# Patient Record
Sex: MALE | Race: WHITE | NOT HISPANIC OR LATINO | Employment: FULL TIME | URBAN - METROPOLITAN AREA
[De-identification: names, ages, dates, MRNs, and addresses within clinical notes are randomized per-mention and may not be internally consistent; named-entity substitution may affect disease eponyms.]

---

## 2017-11-22 ENCOUNTER — ALLSCRIPTS OFFICE VISIT (OUTPATIENT)
Dept: OTHER | Facility: OTHER | Age: 37
End: 2017-11-22

## 2017-11-22 DIAGNOSIS — E78.00 PURE HYPERCHOLESTEROLEMIA: ICD-10-CM

## 2017-11-22 DIAGNOSIS — Q23.1 CONGENITAL INSUFFICIENCY OF AORTIC VALVE: ICD-10-CM

## 2017-11-22 DIAGNOSIS — I10 ESSENTIAL (PRIMARY) HYPERTENSION: ICD-10-CM

## 2017-12-06 ENCOUNTER — GENERIC CONVERSION - ENCOUNTER (OUTPATIENT)
Dept: OTHER | Facility: OTHER | Age: 37
End: 2017-12-06

## 2017-12-06 ENCOUNTER — ALLSCRIPTS OFFICE VISIT (OUTPATIENT)
Dept: OTHER | Facility: OTHER | Age: 37
End: 2017-12-06

## 2017-12-27 ENCOUNTER — ALLSCRIPTS OFFICE VISIT (OUTPATIENT)
Dept: OTHER | Facility: OTHER | Age: 37
End: 2017-12-27

## 2018-01-10 NOTE — PROGRESS NOTES
Assessment   1  Benign essential hypertension (401 1) (I10)  2  Hypercholesterolemia (272 0) (E78 0)  3  Elevated serum creatinine (790 99) (R74 8)    Plan  Elevated serum creatinine    · (1) BASIC METABOLIC PROFILE; Status:Active; Requested XFL:[id number];     Discussion/Summary  Discussion Summary:   1  Hypertension- BP well controlled with diet and exercise  2  Hyperlipidemia- continue heart healthy diet and exercise program    3  Elevated creatinine- please repeat BMP in 1 week  Patient eats a high protein diet  Educated on length on well balanced diet  Patient does not eat fruits or vegetables  Please STOP chewing tobacco  Educated patient to follow up with dentist every 6 months  F/u 4 months  Counseling Documentation With Imm: The patient was counseled regarding diagnostic results, instructions for management, risk factor reductions, prognosis, patient and family education, impressions, risks and benefits of treatment options, importance of compliance with treatment  Medication SE Review and Pt Understands Tx: Possible side effects of new medications were reviewed with the patient/guardian today  The treatment plan was reviewed with the patient/guardian  The patient/guardian understands and agrees with the treatment plan      Chief Complaint  Chief Complaint Free Text Note Form: Pt  here to f/u HTN and cholesterol  review b/w results   no new c/o      History of Present Illness  Hyperlipidemia (Follow-Up): The patient states his hyperlipidemia has been under good control since the last visit  He has no comorbid illnesses  Symptoms: denies chest pain, denies intermittent leg claudication, denies muscle pain and denies muscle weakness  Associated symptoms include no focal neurologic deficits  Medications: The patient is not currently on any medications for his hyperlipidemia  The patient is doing well with his hyperlipidemia goals  the patient's LDL goal is <130 mg/dL     Hypertension (Follow-Up): The patient presents for follow-up of essential hypertension  The patient states he has been doing well with his blood pressure control since the last visit  Symptoms: denies impaired vision, denies dyspnea, denies chest pain, denies intermittent leg claudication and denies lower extremity edema  Associated symptoms include no headache  Home monitoring: The patient is not checking blood pressure at home  Medications: The patient is not currently on any medications for his hypertension--lifestyle modification only  Review of Systems  Complete-Male:   Constitutional: no fever, not feeling poorly, no chills and not feeling tired  Eyes: no eyesight problems  ENT: no sore throat  Cardiovascular: no chest pain and no palpitations  Respiratory: no shortness of breath  Gastrointestinal: no abdominal pain, no nausea and no diarrhea  Genitourinary: no dysuria  Musculoskeletal: no arthralgias and no myalgias  Integumentary: no rashes  Neurological: no headache, no numbness and no dizziness  Psychiatric: no anxiety and no depression  Hematologic/Lymphatic: no tendency for easy bleeding and no tendency for easy bruising  ROS Reviewed:   ROS reviewed  Active Problems   1  Benign essential hypertension (401 1) (I10)  2  Chewing tobacco nicotine dependence (305 1) (F17 220)  3  Hypercholesterolemia (272 0) (E78 0)  4  Plantar fasciitis of left foot (728 71) (M72 2)  5  Uncontrolled hypertension (401 9) (I10)    Past Medical History   1  History of Acute URI (465 9) (J06 9)  2  History of Encounter for screening examination for impaired glucose regulation and diabetes mellitus   (V77 1) (Z13 1)  3  History of headache (V13 89) (Z87 898)  4  History of Hypertension (401 9) (I10)  5  History of Screening for depression (V79 0) (Z13 89)  6  History of Snoring (786 09) (R06 83)  Active Problems And Past Medical History Reviewed:    The active problems and past medical history were reviewed and updated today  Surgical History   1  History of Primary Repair Of Knee Ligament Cruciate Anterior  Surgical History Reviewed: The surgical history was reviewed and updated today  Family History   1  Family history of Benign essential hypertension   2  Family history of Carotid stenosis  3  Family history of Hyperlipidemia  4  Family history of Hypertension  5  Family history of Stroke   6  Family history of Lung cancer   7  Family history of Coronary artery disease  8  Family history of Hyperlipidemia  9  Family history of Hypertension  10  Family history of Type 2 diabetes mellitus  Family History Reviewed: The family history was reviewed and updated today  Social History    · Alcohol use (V49 89) (F10 99)   · Chewing tobacco nicotine dependence (305 1) (F17 220)   · Denied: History of Drug use   · Never a smoker  Social History Reviewed: The social history was reviewed and updated today  Current Meds  1  BuPROPion HCl ER (XL) 150 MG Oral Tablet Extended Release 24 Hour; Take 1 tablet twice daily; Therapy: 97Kyd4904 to (Evaluate:24Jan2016)  Requested for: 91TRK3494; Last Rx:26Oct2015   Ordered  2  Fluticasone Propionate 50 MCG/ACT Nasal Suspension; USE 1 SPRAY IN EACH NOSTRIL TWICE   DAILY; Therapy: 68CNW6215 to (Evaluate:30Jan2015)  Requested for: 02GBI6217; Last XS:10DYZ7099   Ordered  Medication List Reviewed: The medication list was reviewed and updated today  Allergies   1  Penicillins   2  No Known Environmental Allergies  3  No Known Food Allergies    Vitals  Vital Signs [Data Includes: Current Encounter]    Recorded: [id number] 08:53AM   Temperature 98 F, Oral   Heart Rate 73   Systolic 435, LUE, Sitting   Diastolic 84, LUE, Sitting   Weight 293 lb    BMI Calculated 38 13   BSA Calculated 2 54   O2 Saturation 99     Physical Exam    Constitutional   General appearance: No acute distress, well appearing and well nourished      Eyes   Conjunctiva and lids: No swelling, erythema, or discharge  Ears, Nose, Mouth, and Throat   External inspection of ears and nose: Normal     Otoscopic examination: Tympanic membrance translucent with normal light reflex  Canals patent without erythema  Nasal mucosa, septum, and turbinates: Normal without edema or erythema  Oropharynx: Normal with no erythema, edema, exudate or lesions  Pulmonary   Respiratory effort: No increased work of breathing or signs of respiratory distress  Auscultation of lungs: Clear to auscultation, equal breath sounds bilaterally, no wheezes, no rales, no rhonci  no wheezing  Cardiovascular   Palpation of heart: Normal PMI, no thrills  Auscultation of heart: Normal rate and rhythm, normal S1 and S2, without murmurs  Abdomen   Abdomen: Non-tender, no masses  Liver and spleen: No hepatomegaly or splenomegaly  Lymphatic   Palpation of lymph nodes in neck: No lymphadenopathy  Musculoskeletal   Gait and station: Normal     Skin   Skin and subcutaneous tissue: Normal without rashes or lesions  Neurologic no focal deficits  Psychiatric   Orientation to person, place and time: Normal     Mood and affect: Normal          Results/Data  Encounter Results   (1) LIPID PANEL, FASTING 79BRG5313 07:31AM Misbah Bui     Test Name Result Flag Reference   LDL CHOLESTEROL CALCULATED 94       Summary / No summary entered :      No summary entered  Documents attached :      [address] Work - \[facility]\"" pa, team; Enc: [id number] - Image Encounter - \[facility]\"" pa, team -      (Interventional Medicine) (Additional Information Document)    Health Management  History of Screening for depression   *VB-Depression Screening; every 1 year; Last 18VMA3331; Next Due: 25XZO5554;  Active    Future Appointments    Date/Time Provider Specialty Site   05/23/2016 09:15 AM Hank Jim MD Internal Medicine [facility] PRIMARY [facility]     Signatures   Electronically signed by : Liza Fierro, [address] [place] [address]; Jan 13 2016  9:26AM EST (Author)    Electronically signed by : Elvira Osborn MD; Jan 14 2016  4:33PM EST                       (Validation)    Electronically signed by : Elvira Osborn MD; Jan 14 2016  4:33PM EST                       (Validation)

## 2018-01-15 VITALS
DIASTOLIC BLOOD PRESSURE: 82 MMHG | HEART RATE: 64 BPM | WEIGHT: 301.38 LBS | HEIGHT: 74 IN | BODY MASS INDEX: 38.68 KG/M2 | SYSTOLIC BLOOD PRESSURE: 162 MMHG | TEMPERATURE: 95.8 F | OXYGEN SATURATION: 98 %

## 2018-01-17 NOTE — PROGRESS NOTES
Assessment    1  Encounter for preventive health examination (V70 0) (Z00 00)    Plan  Elevated serum creatinine    · (1) BASIC METABOLIC PROFILE; Status:Canceled;   SocHx: Chewing tobacco nicotine dependence    · BuPROPion HCl ER (XL) 150 MG Oral Tablet Extended Release 24 Hour  (Wellbutrin XL)    Discussion/Summary  Impression: health maintenance visit  Currently, he eats a healthy diet  Prostate cancer screening: the risks and benefits of prostate cancer screening were discussed  Testicular cancer screening: the risks and benefits of testicular cancer screening were discussed and self testicular exam technique was taught  Colorectal cancer screening: the risks and benefits of colorectal cancer screening were discussed and colorectal cancer screening is current  Screening lab work includes glucose, lipid profile and urinalysis  Chief Complaint  PT IS HERE FOR A PHYSICAL- PLEASE DISCUSS PT'S FLU SHOT - STATES PERMANENT DEFERRAL      History of Present Illness  , Adult Male: The patient is being seen for a health maintenance evaluation  The last health maintenance visit was 1 year(s) ago  Social History: Household members include spouse and 2 son(s)  He is   Work status: working full time  The patient has never smoked cigarettes  He reports occasional alcohol use  He has never used illicit drugs  General Health: The patient's health since the last visit is described as good  He has regular dental visits  He denies vision problems  He denies hearing loss  Lifestyle:  He consumes a diverse and healthy diet  He does not have any weight concerns  He exercises regularly  He does not use tobacco  He denies alcohol use  He denies drug use  Reproductive health:  the patient is sexually active  He denies erectile dysfunction  Screening: Prostate cancer screening includes no previous evaluation and uncertain timing of prostate-specific antigen testing   Testicular cancer screening includes no previous evaluation and monthly self testicular examinations  Colorectal cancer screening includes no previous screening  Metabolic screening includes lipid profile performed within the past five years, glucose screening performed last year and thyroid function test performed last year  Cardiovascular risk factors: obesity, but no hypertension, no diabetes, no high LDL cholesterol, no low HDL cholesterol, no stress, no tobacco use, no illicit drug use, no sedentary lifestyle and no family history of cardiovascular disease  General health risks: family history of prostate cancer, but no elevated prostate-specific antigen, no undescended testis, no previous colon polyps, no inflammatory bowel disease, no osteoporosis risk factors, no asbestos exposure and no occupational exposure to  Safety elements used: sunscreen, smoke detector, carbon monoxide detector, gun trigger locks and gun safe, but no seat belt, no motorcycle helmet and no safe driving habits  Review of Systems    Constitutional: no fever, not feeling poorly, no chills and not feeling tired  Eyes: no eyesight problems  ENT: no sore throat  Cardiovascular: no chest pain and no palpitations  Respiratory: no shortness of breath  Gastrointestinal: no abdominal pain, no nausea and no diarrhea  Genitourinary: no dysuria  Musculoskeletal: no arthralgias and no myalgias  Integumentary: no rashes  Neurological: no headache, no numbness and no dizziness  Psychiatric: no anxiety and no depression  Hematologic/Lymphatic: no tendency for easy bleeding and no tendency for easy bruising  Active Problems    1  Acute pain of left shoulder (719 41) (M25 512)   2  Aftercare following other surgery of musculoskeletal system (V58 78) (Z47 89)   3  Benign essential hypertension (401 1) (I10)   4  Chewing tobacco nicotine dependence (305 1) (F17 220)   5  Elevated serum creatinine (790 99) (R79 89)   6  Hypercholesterolemia (272 0) (E78 00)   7  Internal derangement of left shoulder (719 81) (M24 112)   8  Need for Tdap vaccination (V06 1) (Z23)   9  Plantar fasciitis of left foot (728 71) (M72 2)   10  Rotator cuff tendonitis, left (726 10) (M75 82)   11  S/P arthroscopy of shoulder (V45 89) (Z98 890)   12  Shoulder weakness (719 61) (M62 81)   13  Superior glenoid labrum lesion of left shoulder, subsequent encounter (V58 89,840 7)    (S43 432D)   14  Uncontrolled hypertension (401 9) (I10)    Past Medical History    · History of Acute URI (465 9) (J06 9)   · History of Encounter for screening examination for impaired glucose regulation and  diabetes mellitus (V77 1) (Z13 1)   · History of headache (V13 89) (M06 140)   · History of Hypertension (401 9) (I10)   · History of Screening for depression (V79 0) (Z13 89)   · History of Snoring (786 09) (R06 83)    The active problems and past medical history were reviewed and updated today  Surgical History    · History of Primary Repair Of Knee Ligament Cruciate Anterior    The surgical history was reviewed and updated today  Family History  Father    · Family history of Benign essential hypertension  Paternal Grandmother    · Family history of Carotid stenosis   · Family history of Hyperlipidemia   · Family history of Hypertension   · Family history of Stroke  Maternal Grandfather    · Family history of Lung cancer  Paternal Grandfather    · Family history of Coronary artery disease   · Family history of Hyperlipidemia   · Family history of Hypertension   · Family history of Type 2 diabetes mellitus    The family history was reviewed and updated today  Social History    · Alcohol use (V49 89) (Z78 9)   · Chewing tobacco nicotine dependence (305 1) (F17 220)   · Denied: History of Drug use   · Never a smoker  The social history was reviewed and updated today  Current Meds   1  BuPROPion HCl ER (XL) 150 MG Oral Tablet Extended Release 24 Hour; Take 1 tablet   twice daily;    Therapy: 41NGB8866 to (Evaluate:77Zcj9519)  Requested for: 07Apr2016; Last   Rx:07Spt7417 Ordered    The medication list was reviewed and updated today  Allergies    1  Penicillins    2  No Known Environmental Allergies   3  No Known Food Allergies    Vitals   Recorded: 36WJR4457 75:02AW   Systolic 346, LUE   Diastolic 82, LUE   Heart Rate 51   Temperature 97 8 F, Tympanic   O2 Saturation 98   Height 6 ft 1 5 in   Weight 287 lb 6 oz   BMI Calculated 37 4   BSA Calculated 2 52     Physical Exam    Constitutional   General appearance: No acute distress, well appearing and well nourished  Eyes   Conjunctiva and lids: No swelling, erythema, or discharge  Ears, Nose, Mouth, and Throat   External inspection of ears and nose: Normal     Otoscopic examination: Tympanic membrance translucent with normal light reflex  Canals patent without erythema  Nasal mucosa, septum, and turbinates: Normal without edema or erythema  Oropharynx: Normal with no erythema, edema, exudate or lesions  Pulmonary   Respiratory effort: No increased work of breathing or signs of respiratory distress  Auscultation of lungs: Clear to auscultation, equal breath sounds bilaterally, no wheezes, no rales, no rhonci  no wheezing  Cardiovascular   Palpation of heart: Normal PMI, no thrills  Auscultation of heart: Normal rate and rhythm, normal S1 and S2, without murmurs  Abdomen   Abdomen: Non-tender, no masses  Liver and spleen: No hepatomegaly or splenomegaly  Lymphatic   Palpation of lymph nodes in neck: No lymphadenopathy  Musculoskeletal   Gait and station: Normal     Skin   Skin and subcutaneous tissue: Normal without rashes or lesions  Neurologic no focal deficits  Psychiatric   Orientation to person, place and time: Normal     Mood and affect: Normal     Additional Exam:  It is post shoulder surgery  Health Management  History of Screening for depression   *VB-Depression Screening; every 1 year;  Last 48JVV1309; Next Due: 47RJL6295; Near  Due    Future Appointments    Date/Time Provider Specialty Site   11/18/2016 08:40 AM SHAHEEN King   Orthopedic Surgery [address]      Signatures   Electronically signed by : Margoth Villegas MD; Oct 17 2016 11:08AM EST                       (Author)

## 2018-01-19 ENCOUNTER — ALLSCRIPTS OFFICE VISIT (OUTPATIENT)
Dept: OTHER | Facility: OTHER | Age: 38
End: 2018-01-19

## 2018-01-19 ENCOUNTER — APPOINTMENT (OUTPATIENT)
Dept: LAB | Facility: MEDICAL CENTER | Age: 38
End: 2018-01-19
Payer: COMMERCIAL

## 2018-01-19 ENCOUNTER — TRANSCRIBE ORDERS (OUTPATIENT)
Dept: RADIOLOGY | Facility: MEDICAL CENTER | Age: 38
End: 2018-01-19

## 2018-01-19 DIAGNOSIS — R14.0 ABDOMINAL DISTENSION (GASEOUS): ICD-10-CM

## 2018-01-19 PROCEDURE — 36415 COLL VENOUS BLD VENIPUNCTURE: CPT

## 2018-01-19 PROCEDURE — 83516 IMMUNOASSAY NONANTIBODY: CPT

## 2018-01-19 PROCEDURE — 82784 ASSAY IGA/IGD/IGG/IGM EACH: CPT

## 2018-01-19 PROCEDURE — 86255 FLUORESCENT ANTIBODY SCREEN: CPT

## 2018-01-20 LAB
ENDOMYSIUM IGA SER QL: NEGATIVE
GLIADIN PEPTIDE IGA SER-ACNC: 6 UNITS (ref 0–19)
GLIADIN PEPTIDE IGG SER-ACNC: 3 UNITS (ref 0–19)
IGA SERPL-MCNC: 182 MG/DL (ref 90–386)
TTG IGA SER-ACNC: <2 U/ML (ref 0–3)
TTG IGG SER-ACNC: <2 U/ML (ref 0–5)

## 2018-01-20 NOTE — PROGRESS NOTES
Assessment   1  Benign essential hypertension (401 1) (I10)  2  Abdominal bloating (787 3) (R14 0)    Plan   Abdominal bloating    · (1) CELIAC DISEASE AB PROFILE; Status:Active; Requested WNO:[id number];    · Follow-up visit in 3 weeks Evaluation and Treatment  Follow-up  Status: Hold For - Scheduling     Requested for: 36VEG0410  Benign essential hypertension    · Start: Valsartan 80 MG Oral Tablet; TAKE 1 TABLET DAILY    Discussion/Summary   Discussion Summary:    Dc coreg, rx losaartan and bp ck here 3 weeks  Exercise ,increase water, portion control  Check celiac panel as pt feels gi sxs have been for sometime and may be due to this  Trying to get stress test approved1              Medication SE Review and Pt Understands Tx: Possible side effects of new medications were reviewed with the patient/guardian today  The treatment plan was reviewed with the patient/guardian  The patient/guardian understands and agrees with the treatment plan             Self Referrals:    Self Referrals: No       1 Amended By: Sarahi Nina; Jan 19 2018 9:25 PM EST      Chief Complaint   Chief Complaint Free Text Note Form: Pt presents for f/up exam  Pt states since his increase in BP medication he has had severe fatigue, diarrhea and not feeling well  Unable to check BP at home  Appetite is normal per patient  Sleep disruptive, normal per patient  Chief Complaint Chronic Condition [facility]: Patient is here today for follow up of chronic conditions described in HPI  History of Present Illness   HPI: Does not feel tolerating coreg well  He us exercising more and trying to eat healthier  Has had looser stills and fatigue on rx  No cp or sob  No dizziness1        1 Amended By: Sarahi Nina; Jan 19 2018 9:22 PM EST      Review of Systems   Complete-Male:      Constitutional:1  No fever or chills, feels well, no tiredness, no recent weight gain or weight loss1         Eyes:1  No complaints of eye pain, no red eyes, no discharge from eyes, no itchy eyes1   ENT:1  no complaints of earache, no hearing loss, no nosebleeds, no nasal discharge, no sore throat, no hoarseness1   Cardiovascular: 1  No complaints of slow heart rate, no fast heart rate, no chest pain, no palpitations, no leg claudication, no lower extremity1   Respiratory:1  shortness of breath during exertion1   Gastrointestinal:1  nausea1 -- and-- diarrhea1   Genitourinary:1  No complaints of dysuria, no incontinence, no hesitancy, no nocturia, no genital lesion, no testicular pain1   Musculoskeletal:1  No complaints of arthralgia, no myalgias, no joint swelling or stiffness, no limb pain or swelling1   Integumentary:1  No complaints of skin rash or skin lesions, no itching, no skin wound, no dry skin1   Neurological:1  No compliants of headache, no confusion, no convulsions, no numbness or tingling, no dizziness or fainting, no limb weakness, no difficulty walking1   Psychiatric:1  Is not suicidal, no sleep disturbances, no anxiety or depression, no change in personality, no emotional problems1   Endocrine:1  No complaints of proptosis, no hot flashes, no muscle weakness, no erectile dysfunction, no deepening of the voice, no feelings of weakness1   Hematologic/Lymphatic:1  No complaints of swollen glands, no swollen glands in the neck, does not bleed easily, no easy bruising1         1 Amended By: Aliya Perez; Jan 19 2018 9:23 PM EST      Active Problems   1  Benign essential hypertension (401 1) (I10)  2  Bicuspid aortic valve (746 4) (Q23 1)  3  Elevated serum creatinine (790 99) (R79 89)  4  Essential hypertension (401 9) (I10)  5  Exercise counseling (V65 41) (Z71 82)  6  Hypercholesterolemia (272 0) (E78 00)  7  Internal derangement of left shoulder (719 81) (M24 812)  8  Need for influenza vaccination (V04 81) (Z23)  9  Plantar fasciitis of left foot (728 71) (M72 2)  10   Rotator cuff tendonitis, left (726 10) (M97 82)  11  S/P arthroscopy of shoulder (V45 89) (Z98 890)  12  Screening for depression (V79 0) (Z13 89)  13  Shoulder weakness (719 61) (R29 898)  14  Superior glenoid labrum lesion of left shoulder, subsequent encounter (V58 89,840 7) (S43 432D)  15  Uncontrolled hypertension (401 9) (I10)    Past Medical History   1  History of Acute pain of left shoulder (719 41) (M25 512)  2  History of Aftercare following other surgery of musculoskeletal system (V58 78) (Z47 89)  3  History of Hypertension (401 9) (I10)  Active Problems And Past Medical History Reviewed: The active problems and past medical history were reviewed and updated today  Surgical History   1  History of Arthroscopy Shoulder Left  2  History of Primary Repair Of Knee Ligament Cruciate Anterior  Surgical History Reviewed: The surgical history was reviewed and updated today  Family History   Father   1  Family history of Benign essential hypertension  Paternal Grandmother   2  Family history of Carotid stenosis  3  Family history of Hyperlipidemia  4  Family history of Hypertension  5  Family history of Stroke  Maternal Grandfather   6  Family history of Lung cancer  Paternal Grandfather   7  Family history of Coronary artery disease  8  Family history of Hyperlipidemia  9  Family history of Hypertension  10  Family history of Type 2 diabetes mellitus  Family History Reviewed: The family history was reviewed and updated today  Social History    · Alcohol use (V49 89) (Z78 9)   · Always uses seat belt   · Caffeine use (V49 89) (F15 90)   · Dental care, regularly   · Denied: History of Drug use   · Never a smoker   ·  (V61 07) (Z63 4)  Social History Reviewed: The social history was reviewed and updated today  The social history was reviewed and is unchanged  Current Meds   1  Carvedilol 6 25 MG Oral Tablet; TAKE 1 TABLET BY MOUTH BID;      Therapy: 02UHS3449 to (Evaluate:92Kly2317)  Requested for: 36LML2265; Last Rx:89Kyo2622     Ordered  Medication List Reviewed: The medication list was reviewed and updated today  Allergies   1  Penicillins  2  No Known Environmental Allergies  3  No Known Food Allergies    Vitals   Vital Signs    Recorded: 83TVP1292 09:40AM Recorded: 27GQE9096 09:20AM   Temperature  96 1 F, Tympanic   Heart Rate  55   Systolic 332    Diastolic 78    Height  6 ft 1 5 in   Weight  292 lb 6 oz   BMI Calculated  38 05   BSA Calculated  2 54   O2 Saturation  98, RA     Physical Exam        Constitutional      General appearance: No acute distress, well appearing and well nourished  -- Comfortable at rest1   Eyes      Conjunctiva and lids: No swelling, erythema, or discharge  Pupils and irises: Equal, round and reactive to light  Ears, Nose, Mouth, and Throat      External inspection of ears and nose: Normal        Otoscopic examination: Tympanic membrance translucent with normal light reflex  Canals patent without erythema  Nasal mucosa, septum, and turbinates: Normal without edema or erythema  Oropharynx: Normal with no erythema, edema, exudate or lesions  Pulmonary      Respiratory effort: No increased work of breathing or signs of respiratory distress  Auscultation of lungs: Clear to auscultation, equal breath sounds bilaterally, no wheezes, no rales, no rhonci  Cardiovascular      Palpation of heart: Normal PMI, no thrills  Auscultation of heart: Normal rate and rhythm, normal S1 and S2, without murmurs  Examination of extremities for edema and/or varicosities: Normal        Carotid pulses: Normal        Abdomen      Abdomen: Non-tender, no masses  Liver and spleen: No hepatomegaly or splenomegaly  Lymphatic      Palpation of lymph nodes in neck: No lymphadenopathy  Musculoskeletal      Gait and station: Normal        Digits and nails: Normal without clubbing or cyanosis         Inspection/palpation of joints, bones, and muscles: Normal        Skin      Skin and subcutaneous tissue: Normal without rashes or lesions  Neurologic      Cranial nerves: Cranial nerves 2-12 intact  Reflexes: 2+ and symmetric  Sensation: No sensory loss  Psychiatric      Orientation to person, place and time: Normal        Mood and affect: Normal            1 Amended By: Carmencita Shaw; Jan 19 2018 9:23 PM EST      Health Management   History of Screening for depression   *VB-Depression Screening; every 1 year; Last 22Nov2017; Next Due: 22Nov2018;  Active    Signatures    Electronically signed by : Tommy Jimenez DO; Jan 19 2018  9:46AM EST                       (Author)     Electronically signed by : Tommy Jimenez DO; Jan 19 2018  9:25PM EST                       (Author)

## 2018-01-22 VITALS — SYSTOLIC BLOOD PRESSURE: 140 MMHG | DIASTOLIC BLOOD PRESSURE: 88 MMHG

## 2018-01-23 VITALS
SYSTOLIC BLOOD PRESSURE: 136 MMHG | DIASTOLIC BLOOD PRESSURE: 78 MMHG | TEMPERATURE: 96.1 F | OXYGEN SATURATION: 98 % | HEIGHT: 74 IN | WEIGHT: 292.38 LBS | HEART RATE: 55 BPM | BODY MASS INDEX: 37.52 KG/M2

## 2018-01-23 NOTE — PROGRESS NOTES
Chief Complaint  Pt presents today for a BP check  BP was 142/90 in LUE  Pt currently taking carvedilol 6 25mg 1 tab po daily  Active Problems    1  Benign essential hypertension (401 1) (I10)   2  Bicuspid aortic valve (746 4) (Q23 1)   3  Elevated serum creatinine (790 99) (R79 89)   4  Essential hypertension (401 9) (I10)   5  Exercise counseling (V65 41) (Z71 82)   6  Hypercholesterolemia (272 0) (E78 00)   7  Internal derangement of left shoulder (719 81) (M24 812)   8  Need for influenza vaccination (V04 81) (Z23)   9  Plantar fasciitis of left foot (728 71) (M72 2)   10  Rotator cuff tendonitis, left (726 10) (M75 82)   11  S/P arthroscopy of shoulder (V45 89) (Z98 890)   12  Screening for depression (V79 0) (Z13 89)   13  Shoulder weakness (719 61) (R29 898)   14  Superior glenoid labrum lesion of left shoulder, subsequent encounter (V58 89,840 7)    (S43 432D)   15  Uncontrolled hypertension (401 9) (I10)    Current Meds   1  Carvedilol 6 25 MG Oral Tablet; take 1 tablet by mouth once daily; Therapy: 46NLQ5483 to (Evaluate:84Yzi9122)  Requested for: 22Nov2017; Last   Rx:22Nov2017 Ordered    Allergies    1  Penicillins    2  No Known Environmental Allergies   3   No Known Food Allergies    Vitals  Signs    Systolic: 113, LUE, Sitting  Diastolic: 90, LUE, Sitting    Signatures   Electronically signed by : Jose Morton MA; Dec  6 2017  8:13AM EST                       (Co-author)    Electronically signed by : Stefano Bowen DO; Dec  6 2017  8:46AM EST                       (Author)

## 2018-01-23 NOTE — PROGRESS NOTES
Chief Complaint  Pt presents today for a BP check  BP was 140/88 in LUE  Pt is currently taking carvedilol 6 25mg daily  Active Problems    1  Benign essential hypertension (401 1) (I10)   2  Bicuspid aortic valve (746 4) (Q23 1)   3  Elevated serum creatinine (790 99) (R79 89)   4  Essential hypertension (401 9) (I10)   5  Exercise counseling (V65 41) (Z71 82)   6  Hypercholesterolemia (272 0) (E78 00)   7  Internal derangement of left shoulder (719 81) (M24 812)   8  Need for influenza vaccination (V04 81) (Z23)   9  Plantar fasciitis of left foot (728 71) (M72 2)   10  Rotator cuff tendonitis, left (726 10) (M75 82)   11  S/P arthroscopy of shoulder (V45 89) (Z98 890)   12  Screening for depression (V79 0) (Z13 89)   13  Shoulder weakness (719 61) (R29 898)   14  Superior glenoid labrum lesion of left shoulder, subsequent encounter (V58 89,840 7)    (S43 432D)   15  Uncontrolled hypertension (401 9) (I10)    Current Meds   1  Carvedilol 6 25 MG Oral Tablet; take 1 tablet by mouth once daily; Therapy: 60XHW1177 to (Evaluate:30Hdi4796)  Requested for: 22Nov2017; Last   Rx:22Nov2017 Ordered    Allergies    1  Penicillins    2  No Known Environmental Allergies   3   No Known Food Allergies    Vitals  Signs    Systolic: 821, LUE, Sitting  Diastolic: 88, LUE, Sitting    Signatures   Electronically signed by : Hilda Parra MA; Dec 27 2017 10:24AM EST                       (Co-author)    Electronically signed by : Adriel Zapata DO; Dec 27 2017 10:34AM EST                       (Author)

## 2018-01-24 VITALS — SYSTOLIC BLOOD PRESSURE: 142 MMHG | DIASTOLIC BLOOD PRESSURE: 90 MMHG

## 2018-01-24 NOTE — PROGRESS NOTES
Assessment   1  Never a smoker  2  Screening for depression (V79 0) (Z13 89)  3  Exercise counseling (V65 41) (Z71 82)  4  Bicuspid aortic valve (746 4) (Q23 1)  5  Essential hypertension (401 9) (I10)    Plan  Benign essential hypertension, Bicuspid aortic valve    · Follow-up visit in 2 weeks Evaluation and Treatment  Follow-up  Status: Hold For -  Scheduling  Requested for: 22Nov2017  Benign essential hypertension, Bicuspid aortic valve, Essential hypertension    · Start: Carvedilol 6 25 MG Oral Tablet; take 1 tablet by mouth once daily  Bicuspid aortic valve    · ECHO COMPLETE WITH CONTRAST IF INDICATED; Status:Need Information -  Financial Authorization; Requested for:22Nov2017;   Bicuspid aortic valve, Essential hypertension    · (1) COMPREHENSIVE METABOLIC PANEL; Status:Active; Requested for:22Nov2017;   Exercise counseling    · Benefits of Exercise/Physical Activity; Status:Complete - Retrospective By Protocol  Authorization;   Done: 43OAJ8340   · Keep a diary of when and what you eat ; Status:Complete - Retrospective By Protocol  Authorization;   Done: 32CLZ3960   · Some eating tips that can help you lose weight ; Status:Complete - Retrospective By  Protocol Authorization;   Done: 91OZO7045  Hypercholesterolemia    · (1) LIPID PANEL, FASTING; Status:Active; Requested for:22Nov2017;   PMH: Screening for depression    · *VB-Depression Screening ; every 1 year; Last 31YCS0212; Next 79GAO9706;  Status:Active  Screening for depression    · *VB-Depression Screening; Status:Complete;   Done: 52LLC5904 07:56AM    Discussion/Summary  Impression: health maintenance visit  Currently, he  eats an adequate diet1  and  has an inadequate exercise regimen1  1   Prostate cancer screening:  PSA is not indicated1  1   Testicular cancer screening:  monthly self testicular exam was advised1  1   Colorectal cancer screening:  colorectal cancer screening is not indicated1  1    The immunizations will be given as outlined in the orders1   Advice and education were given regarding  weight bearing exercise1  1   Patient discussion:  discussed with the patient1  1   Start coreg daily  Increase water intake  Exercise daily  Rx fbw  Flu shot today  Bp check in 2 weeks  Echo to assess bicuspid aortic valve  1    Possible side effects of new medications were reviewed with the patient/guardian today  The treatment plan was reviewed with the patient/guardian  The patient/guardian understands and agrees with the treatment plan     Self Referrals: No       1 Amended By: Jyothi Morillo; Nov 22 2017 7:44 PM EST    Chief Complaint  Pt presents for Well Exam today  Pt feels well today and without complaints  Appetite is normal per patient  No falls  Appetite is normal per patient  Flu vaccine requested today  History of Present Illness  HM, Adult Male: The patient is being seen for a health maintenance evaluation  The last health maintenance visit was year(s) ago  General Health: The patient's health since the last visit is described as1  good1   He has regular dental visits1   He denies vision problems1   He denies hearing loss1   Immunizations status:1  up to date1   Lifestyle:1   He consumes a diverse and healthy diet1   He has weight concerns1   He does not exercise regularly1   He does not use tobacco1   He denies alcohol use1   He denies drug use1   Plans exercise/weight loss1   Reproductive health:1   The patient is sexually active1   Birth control is not being practiced1   He denies erectile dysfunction1   Screening: Cancer screening reviewed and current1   Metabolic screening reviewed and current1   Risk screening reviewed and current1   HPI: Patient here for checkup  He admits to weight gain of 30 pounds and lack of exercise over past year  He is concerned that bp may be high  No cp but he was in er last year due to stress  No sob but increased gerd  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[employer]   He wants to lose weight and gas restarted exercise  His Dad has htn for years on rx  No high sodium diet but works night shift  1        1 Amended By: Mariam Ny; Nov 22 2017 7:39 PM EST    Review of Systems    Constitutional:1  No fever or chills, feels well, no tiredness, no recent weight gain or weight loss1   Eyes:1  No complaints of eye pain, no red eyes, no discharge from eyes, no itchy eyes1  and no eyesight problems1   ENT:1  no complaints of earache, no hearing loss, no nosebleeds, no nasal discharge, no sore throat, no hoarseness1   Cardiovascular: 1  No complaints of slow heart rate, no fast heart rate, no chest pain, no palpitations, no leg claudication, no lower extremity1   Respiratory:1  No complaints of shortness of breath, no wheezing, no cough, no SOB on exertion, no orthopnea or PND1   Gastrointestinal: as noted in HPI1   Genitourinary:1  No complaints of dysuria, no incontinence, no hesitancy, no nocturia, no genital lesion, no testicular pain1   Musculoskeletal:1  myalgias1   Integumentary:1  No complaints of skin rash or skin lesions, no itching, no skin wound, no dry skin1   Neurological:1  no headache1  and no dizziness1   Psychiatric:1  Is not suicidal, no sleep disturbances, no anxiety or depression, no change in personality, no emotional problems1   Endocrine:1  No complaints of proptosis, no hot flashes, no muscle weakness, no erectile dysfunction, no deepening of the voice, no feelings of weakness1   Hematologic/Lymphatic:1  No complaints of swollen glands, no swollen glands in the neck, does not bleed easily, no easy bruising1         1 Amended By: Mariam Ny; Nov 22 2017 7:40 PM EST    Active Problems   1  Benign essential hypertension (401 1) (I10)  2  Elevated serum creatinine (790 99) (R79 89)  3  Hypercholesterolemia (272 0) (E78 00)  4  Internal derangement of left shoulder (719 81) (M24 812)  5  Plantar fasciitis of left foot (728 71) (M72 2)  6   Rotator cuff tendonitis, left (726 10) (M75 82)  7  S/P arthroscopy of shoulder (V45 89) (Z98 890)  8  Shoulder weakness (719 61) (R29 898)  9  Superior glenoid labrum lesion of left shoulder, subsequent encounter (V58 89,840 7)   (S43 432D)  10  Uncontrolled hypertension (401 9) (I10)    Past Medical History    · History of Acute pain of left shoulder (719 41) (M25 512)   · History of Aftercare following other surgery of musculoskeletal system (V58 78) (Z47 89)   · History of Hypertension (401 9) (I10)    Surgical History    · History of Arthroscopy Shoulder Left   · History of Primary Repair Of Knee Ligament Cruciate Anterior    Family History  Father    · Family history of Benign essential hypertension  Paternal Grandmother    · Family history of Carotid stenosis   · Family history of Hyperlipidemia   · Family history of Hypertension   · Family history of Stroke  Maternal Grandfather    · Family history of Lung cancer  Paternal Grandfather    · Family history of Coronary artery disease   · Family history of Hyperlipidemia   · Family history of Hypertension   · Family history of Type 2 diabetes mellitus    Social History    · Alcohol use (V49 89) (Z78 9)   · Caffeine use (V49 89) (F15 90)   · Dental care, regularly   · Denied: History of Drug use   · Never a smoker   ·  (V61 07) (Z63 4)    Current Meds  1  No Reported Medications  Requested for: 22Nov2017 Recorded    Allergies   1  Penicillins   2  No Known Environmental Allergies  3  No Known Food Allergies    Vitals   Recorded: 22Nov2017 07:40PM Recorded: 22Nov2017 07:49AM   Temperature 1 95 8 F, Tympanic   Heart Rate 1 64   Systolic 8611 1   Diastolic 626 1   Height 1 6 ft 1 5 in   Weight 1 301 lb 6 oz   BMI Calculated 1 39 22   BSA Calculated 1 2 57   O2 Saturation 1 98, RA       1  Amended By: Jyothi Morillo; Nov 22 2017 7:40 PM EST   Physical Exam    Constitutional1    General appearance: No acute distress, well appearing and well nourished  1    Head and Face1    Head and face: Normal 1 Palpation of the face and sinuses: No sinus tenderness  1    Eyes1    Conjunctiva and lids: No erythema, swelling or discharge  1    Pupils and irises: Equal, round, reactive to light  1    Ophthalmoscopic examination: Normal fundi and optic discs  1    Ears, Nose, Mouth, and Throat1    External inspection of ears and nose: Normal 1    Otoscopic examination: Tympanic membranes translucent with normal light reflex  Canals patent without erythema  1    Hearing: Normal 1    Nasal mucosa, septum, and turbinates: Normal without edema or erythema  1    Lips, teeth, and gums: Normal, good dentition  1    Oropharynx: Normal with no erythema, edema, exudate or lesions  1    Neck1    Neck: Supple, symmetric, trachea midline, no masses  1    Thyroid: Normal, no thyromegaly  1    Pulmonary1    Respiratory effort: No increased work of breathing or signs of respiratory distress  1    Percussion of chest: Normal 1    Palpation of chest: Normal 1    Auscultation of lungs: Clear to auscultation  1    Cardiovascular1    Palpation of heart: Normal PMI, no thrills  1    Auscultation of heart: Normal rate and rhythm, normal S1 and S2, no murmurs  1    Carotid pulses: 2+ bilaterally  1    Abdominal aorta: Normal 1    Femoral pulses: 2+ bilaterally  1    Pedal pulses: 2+ bilaterally  1    Peripheral vascular exam: Normal 1    Examination of extremities for edema and/or varicosities: Normal 1    Abdomen1    Abdomen: Non-tender, no masses  1    Liver and spleen: No hepatomegaly or splenomegaly  1    Examination for hernias: No hernias appreciated  1    Lymphatic1    Palpation of lymph nodes in neck: No lymphadenopathy  1    Palpation of lymph nodes in axillae: No lymphadenopathy  1    Palpation of lymph nodes in groin: No lymphadenopathy  1    Palpation of lymph nodes in other areas: No lymphadenopathy  1    Musculoskeletal1    Gait and station: Normal 1    Inspection/palpation of digits and nails: Normal without clubbing or cyanosis  1    Inspection/palpation of joints, bones, and muscles: Normal 1    Range of motion: Normal 1    Stability: Normal 1    Muscle strength/tone: Normal 1    Skin1    Skin and subcutaneous tissue: Normal without rashes or lesions  1    Palpation of skin and subcutaneous tissue: Normal turgor  1    Neurologic1    Cranial nerves: Cranial nerves 2-12 intact  1    Cortical function: Normal mental status  1    Reflexes: 2+ and symmetric  1    Sensation: No sensory loss  1    Coordination: Normal finger to nose and heel to shin  1    Psychiatric1    Judgment and insight: Normal 1    Orientation to person, place and time: Normal 1    Recent and remote memory: Intact  1    Mood and affect: Normal 1        1 Amended By: Sheridan Cintron; Nov 22 2017 7:41 PM EST    Results/Data  PHQ-2 Adult Depression Screening 22Nov2017 07:56AM Raquel s     Test Name Result Flag Reference   PHQ-2 Adult Depression Score 0     Over the last two weeks, how often have you been bothered by any of the following problems? Little interest or pleasure in doing things: Not at all - 0  Feeling down, depressed, or hopeless: Not at all - 0   PHQ-2 Adult Depression Screening Negative       *VB-Depression Screening 22Nov2017 07:56AM Sheridan Cintron     Test Name Result Flag Reference   Depression Scale Result      Depression Screen - Negative For Symptoms       Health Management  History of Screening for depression   *VB-Depression Screening; every 1 year; Last 22Nov2017; Next Due: 22Nov2018;  Active    Signatures   Electronically signed by : Obi Elkins DO; Nov 22 2017  7:44PM EST                       (Author)

## 2018-02-02 ENCOUNTER — APPOINTMENT (OUTPATIENT)
Dept: LAB | Facility: MEDICAL CENTER | Age: 38
End: 2018-02-02
Payer: COMMERCIAL

## 2018-02-02 ENCOUNTER — TRANSCRIBE ORDERS (OUTPATIENT)
Dept: LAB | Facility: MEDICAL CENTER | Age: 38
End: 2018-02-02

## 2018-02-02 DIAGNOSIS — Q23.1 CONGENITAL INSUFFICIENCY OF AORTIC VALVE: ICD-10-CM

## 2018-02-02 DIAGNOSIS — E78.00 PURE HYPERCHOLESTEROLEMIA: ICD-10-CM

## 2018-02-02 DIAGNOSIS — I10 ESSENTIAL (PRIMARY) HYPERTENSION: ICD-10-CM

## 2018-02-02 LAB
ALBUMIN SERPL BCP-MCNC: 3.7 G/DL (ref 3.5–5)
ALP SERPL-CCNC: 47 U/L (ref 46–116)
ALT SERPL W P-5'-P-CCNC: 24 U/L (ref 12–78)
ANION GAP SERPL CALCULATED.3IONS-SCNC: 6 MMOL/L (ref 4–13)
AST SERPL W P-5'-P-CCNC: 13 U/L (ref 5–45)
BILIRUB SERPL-MCNC: 1.18 MG/DL (ref 0.2–1)
BUN SERPL-MCNC: 12 MG/DL (ref 5–25)
CALCIUM SERPL-MCNC: 8.6 MG/DL (ref 8.3–10.1)
CHLORIDE SERPL-SCNC: 105 MMOL/L (ref 100–108)
CHOLEST SERPL-MCNC: 140 MG/DL (ref 50–200)
CO2 SERPL-SCNC: 25 MMOL/L (ref 21–32)
CREAT SERPL-MCNC: 1.1 MG/DL (ref 0.6–1.3)
GFR SERPL CREATININE-BSD FRML MDRD: 85 ML/MIN/1.73SQ M
GLUCOSE P FAST SERPL-MCNC: 114 MG/DL (ref 65–99)
HDLC SERPL-MCNC: 44 MG/DL (ref 40–60)
LDLC SERPL CALC-MCNC: 71 MG/DL (ref 0–100)
POTASSIUM SERPL-SCNC: 3.5 MMOL/L (ref 3.5–5.3)
PROT SERPL-MCNC: 6.8 G/DL (ref 6.4–8.2)
SODIUM SERPL-SCNC: 136 MMOL/L (ref 136–145)
TRIGL SERPL-MCNC: 124 MG/DL

## 2018-02-02 PROCEDURE — 80061 LIPID PANEL: CPT

## 2018-02-02 PROCEDURE — 36415 COLL VENOUS BLD VENIPUNCTURE: CPT

## 2018-02-02 PROCEDURE — 80053 COMPREHEN METABOLIC PANEL: CPT

## 2018-02-08 RX ORDER — VALSARTAN 80 MG/1
1 TABLET ORAL DAILY
COMMUNITY
Start: 2018-01-19 | End: 2018-04-02 | Stop reason: SDUPTHER

## 2018-02-08 RX ORDER — CARVEDILOL 6.25 MG/1
1 TABLET ORAL 2 TIMES DAILY
COMMUNITY
Start: 2017-11-22 | End: 2018-05-11 | Stop reason: ALTCHOICE

## 2018-02-09 ENCOUNTER — CLINICAL SUPPORT (OUTPATIENT)
Dept: INTERNAL MEDICINE CLINIC | Facility: CLINIC | Age: 38
End: 2018-02-09

## 2018-02-09 VITALS — DIASTOLIC BLOOD PRESSURE: 95 MMHG | SYSTOLIC BLOOD PRESSURE: 140 MMHG

## 2018-02-09 DIAGNOSIS — I10 HYPERTENSION, UNSPECIFIED TYPE: ICD-10-CM

## 2018-02-09 NOTE — PROGRESS NOTES
Pt presents today for a BP check  BP was 140/95 in LUE  Asked pt if he took his medication yet today, and he reports that he took it about an hour prior to the check    Pt currently takes coreg 6 25 and valsartan 80mg

## 2018-04-02 DIAGNOSIS — I10 HYPERTENSION, UNSPECIFIED TYPE: Primary | ICD-10-CM

## 2018-04-02 RX ORDER — VALSARTAN 80 MG/1
80 TABLET ORAL DAILY
Qty: 30 TABLET | Refills: 5 | Status: SHIPPED | OUTPATIENT
Start: 2018-04-02 | End: 2018-06-15 | Stop reason: ALTCHOICE

## 2018-04-02 NOTE — TELEPHONE ENCOUNTER
Ok to refill  He was monitoring his bp at home so just make sure it has been stable   He should have at least 3 months follow up from last visit if BP stable on this rx

## 2018-04-17 PROBLEM — Q23.1 BICUSPID AORTIC VALVE: Status: ACTIVE | Noted: 2017-11-22

## 2018-04-17 PROBLEM — I10 ESSENTIAL HYPERTENSION: Status: ACTIVE | Noted: 2017-11-22

## 2018-04-17 PROBLEM — Q23.81 BICUSPID AORTIC VALVE: Status: ACTIVE | Noted: 2017-11-22

## 2018-05-11 ENCOUNTER — OFFICE VISIT (OUTPATIENT)
Dept: INTERNAL MEDICINE CLINIC | Facility: CLINIC | Age: 38
End: 2018-05-11
Payer: COMMERCIAL

## 2018-05-11 VITALS
SYSTOLIC BLOOD PRESSURE: 142 MMHG | HEIGHT: 74 IN | TEMPERATURE: 96.7 F | OXYGEN SATURATION: 97 % | BODY MASS INDEX: 38.37 KG/M2 | HEART RATE: 67 BPM | WEIGHT: 299 LBS | DIASTOLIC BLOOD PRESSURE: 70 MMHG

## 2018-05-11 DIAGNOSIS — E78.00 HYPERCHOLESTEROLEMIA: ICD-10-CM

## 2018-05-11 DIAGNOSIS — Q23.1 BICUSPID AORTIC VALVE: ICD-10-CM

## 2018-05-11 DIAGNOSIS — I10 ESSENTIAL HYPERTENSION: Primary | ICD-10-CM

## 2018-05-11 PROCEDURE — 99214 OFFICE O/P EST MOD 30 MIN: CPT | Performed by: INTERNAL MEDICINE

## 2018-05-11 RX ORDER — LISINOPRIL AND HYDROCHLOROTHIAZIDE 12.5; 1 MG/1; MG/1
1 TABLET ORAL DAILY
Qty: 30 TABLET | Refills: 0 | Status: SHIPPED | OUTPATIENT
Start: 2018-05-11 | End: 2018-06-12 | Stop reason: SDUPTHER

## 2018-05-11 NOTE — PROGRESS NOTES
Assessment/Plan:         Diagnoses and all orders for this visit:    Essential hypertension  DC diovan  Lisinopril 10/12 5mg daily  No added salt diet    Hypercholesterolemia  Recent labs were wnl    Bicuspid aortic valve  Recheck echo annually       Patient ID: Racheal Villagran is a 40 y o  male  HPI   Pt doing ok and is exercising regularly but last week started with edema of ankles  He stopped diovan  Last week and edema improved  He had a headache last Monday  No chest pain or sob  The following portions of the patient's history were reviewed and updated as appropriate:  Past Medical History:   Diagnosis Date    Aortic valve cusp abnormality     "bicuspid aortic valve"    Hypertension      Past Surgical History:   Procedure Laterality Date    ANTERIOR CRUCIATE LIGAMENT REPAIR Right     OTHER SURGICAL HISTORY      SAE    [place] SHLDR ARTHROSCOP,SURG,REPAIR,SLAP LESION Left 5/26/2016    Procedure: SHOULDER ARTHROSCOPY SLAP REPAIR;  Surgeon: Tricia Carolina MD;  Location: [facility];  Service: Orthopedics    VASECTOMY       Allergies   Allergen Reactions    Penicillins Hives     Family History   Problem Relation Age of Onset    Hypertension Father     Lung cancer Maternal Grandfather     Other Paternal Grandmother      Carotid Stenosis     Hyperlipidemia Paternal Grandmother     Hypertension Paternal Grandmother     Stroke Paternal Grandmother     Coronary artery disease Paternal Grandfather     Hyperlipidemia Paternal Grandfather     Hypertension Paternal Grandfather     Diabetes type II Paternal Grandfather      Review of Systems   Constitutional: Negative  HENT: Negative  Eyes: Negative  Respiratory: Negative for shortness of breath  Cardiovascular: Negative for chest pain and palpitations  Gastrointestinal: Negative  Endocrine: Negative  Genitourinary: Negative  Musculoskeletal: Negative  Negative for neck pain  Allergic/Immunologic: Negative      Neurological: Negative for headaches  Hematological: Negative  Psychiatric/Behavioral: Negative  /70   Pulse 67   Temp (!) 96 7 °F (35 9 °C) (Tympanic)   Ht 6' 1 5" (1 867 m)   Wt 136 kg (299 lb)   SpO2 97%   BMI 38 91 kg/m²          Physical Exam   Constitutional: He is oriented to person, place, and time  He appears well-developed and well-nourished  No distress  HENT:   Head: Normocephalic and atraumatic  Right Ear: External ear normal    Left Ear: External ear normal    Nose: Nose normal    Mouth/Throat: Oropharynx is clear and moist  No oropharyngeal exudate  Eyes: Conjunctivae and EOM are normal  Pupils are equal, round, and reactive to light  No scleral icterus  Neck: Normal range of motion  Neck supple  No JVD present  Pulmonary/Chest: Effort normal and breath sounds normal    Abdominal: Soft  Bowel sounds are normal  He exhibits no distension and no mass  There is no tenderness  There is no rebound and no guarding  Musculoskeletal: Normal range of motion  He exhibits no edema, tenderness or deformity  Lymphadenopathy:     He has no cervical adenopathy  Neurological: He is alert and oriented to person, place, and time  He has normal reflexes  He displays normal reflexes  No cranial nerve deficit  He exhibits normal muscle tone  Coordination normal    Skin: Skin is warm and dry  He is not diaphoretic  Psychiatric: He has a normal mood and affect  His behavior is normal  Judgment and thought content normal    Nursing note and vitals reviewed

## 2018-06-12 DIAGNOSIS — I10 ESSENTIAL HYPERTENSION: ICD-10-CM

## 2018-06-12 RX ORDER — LISINOPRIL AND HYDROCHLOROTHIAZIDE 12.5; 1 MG/1; MG/1
1 TABLET ORAL DAILY
Qty: 30 TABLET | Refills: 5 | Status: SHIPPED | OUTPATIENT
Start: 2018-06-12 | End: 2019-01-09 | Stop reason: SDUPTHER

## 2018-06-12 NOTE — TELEPHONE ENCOUNTER
----- Message from Dandy Chowdhury sent at 6/12/2018  9:10 AM EDT -----  Regarding: Prescription Question  Contact: [phone]  Can you please call in a refill on my latest blood pressure med  I have an appointment scheduled for Friday, but only enough medication to last me until Wednesday  Thanks

## 2018-06-15 ENCOUNTER — OFFICE VISIT (OUTPATIENT)
Dept: INTERNAL MEDICINE CLINIC | Facility: CLINIC | Age: 38
End: 2018-06-15
Payer: COMMERCIAL

## 2018-06-15 VITALS
TEMPERATURE: 96.2 F | HEIGHT: 74 IN | OXYGEN SATURATION: 95 % | BODY MASS INDEX: 38.02 KG/M2 | HEART RATE: 61 BPM | WEIGHT: 296.25 LBS

## 2018-06-15 DIAGNOSIS — I10 ESSENTIAL HYPERTENSION: Primary | ICD-10-CM

## 2018-06-15 DIAGNOSIS — E78.00 HYPERCHOLESTEROLEMIA: ICD-10-CM

## 2018-06-15 PROCEDURE — 99214 OFFICE O/P EST MOD 30 MIN: CPT | Performed by: INTERNAL MEDICINE

## 2018-06-15 NOTE — PROGRESS NOTES
Assessment/Plan:      Diagnoses and all orders for this visit:    Essential hypertension  Doing well on present rx andf working on diet and exercise in conjunction'continue the same    Hypercholesterolemia  Last labs were stable, recheck in one year     Increase water intake during summer months and caution with hot weather and sun exposure    Rto 4 months ?preop for shoulder if needs surgery     Patient ID: Clair Godfrey is a 45 y o  male  HPI   Pt feels better on new blood pressure Rx and has been doing better with diet and activity level  No sob or chest pain  No leg cramps  No side effects noted on new rx and edema is better    Review of Systems   Constitutional: Negative  HENT: Negative  Eyes: Negative  Respiratory: Negative  Negative for shortness of breath  Cardiovascular: Negative  Negative for chest pain and palpitations  Gastrointestinal: Negative  Endocrine: Negative  Genitourinary: Negative  Musculoskeletal: Positive for arthralgias  Negative for neck pain  Shoulder pain ongoing   Skin: Negative  Allergic/Immunologic: Negative  Neurological: Negative for headaches  Hematological: Negative  Psychiatric/Behavioral: Negative  Vitals:    06/15/18 0725   Pulse: 61   Temp: (!) 96 2 °F (35 7 °C)   TempSrc: Tympanic   SpO2: 95%   Weight: 134 kg (296 lb 4 oz)   Height: 6' 1 5" (1 867 m)   /68    Social History     Social History    Marital status:      Spouse name: N/A    Number of children: N/A    Years of education: N/A     Occupational History    Not on file       Social History Main Topics    Smoking status: Never Smoker    Smokeless tobacco: Never Used    Alcohol use Yes      Comment: socially    Drug use: No    Sexual activity: Yes     Other Topics Concern    Not on file     Social History Narrative    Always uses seat belt     Caffeine Use     Dental care regularly           Past Medical History:   Diagnosis Date    Aortic valve cusp abnormality     "bicuspid aortic valve"    Hypertension      Past Surgical History:   Procedure Laterality Date    ANTERIOR CRUCIATE LIGAMENT REPAIR Right     OTHER SURGICAL HISTORY      SAE    [place] SHLDR ARTHROSCOP,SURG,REPAIR,SLAP LESION Left 5/26/2016    Procedure: SHOULDER ARTHROSCOPY SLAP REPAIR;  Surgeon: Ernesto Wiggins MD;  Location: [facility];  Service: Orthopedics    VASECTOMY       Allergies   Allergen Reactions    Penicillins Hives                Physical Exam   Constitutional: He is oriented to person, place, and time  He appears well-developed and well-nourished  No distress  HENT:   Head: Normocephalic and atraumatic  Right Ear: External ear normal    Left Ear: External ear normal    Nose: Nose normal    Mouth/Throat: Oropharynx is clear and moist  No oropharyngeal exudate  Eyes: Conjunctivae and EOM are normal  Pupils are equal, round, and reactive to light  No scleral icterus  Neck: Normal range of motion  Neck supple  No JVD present  Cardiovascular: Normal rate, regular rhythm, normal heart sounds and intact distal pulses  Pulmonary/Chest: Effort normal and breath sounds normal    Abdominal: Soft  Bowel sounds are normal    Musculoskeletal: Normal range of motion  He exhibits tenderness  He exhibits no edema or deformity  Restricted rom left shoulder   Lymphadenopathy:     He has no cervical adenopathy  Neurological: He is alert and oriented to person, place, and time  He has normal reflexes  He displays normal reflexes  No cranial nerve deficit  He exhibits abnormal muscle tone  Coordination normal    Skin: Skin is warm and dry  He is not diaphoretic  Psychiatric: He has a normal mood and affect  His behavior is normal  Judgment and thought content normal    Nursing note and vitals reviewed

## 2018-07-25 ENCOUNTER — APPOINTMENT (OUTPATIENT)
Dept: RADIOLOGY | Facility: MEDICAL CENTER | Age: 38
End: 2018-07-25
Payer: COMMERCIAL

## 2018-07-25 ENCOUNTER — OFFICE VISIT (OUTPATIENT)
Dept: OBGYN CLINIC | Facility: CLINIC | Age: 38
End: 2018-07-25
Payer: COMMERCIAL

## 2018-07-25 VITALS
HEART RATE: 60 BPM | WEIGHT: 296 LBS | BODY MASS INDEX: 37.99 KG/M2 | DIASTOLIC BLOOD PRESSURE: 85 MMHG | HEIGHT: 74 IN | SYSTOLIC BLOOD PRESSURE: 142 MMHG

## 2018-07-25 DIAGNOSIS — S43.431A SUPERIOR GLENOID LABRUM LESION OF RIGHT SHOULDER, INITIAL ENCOUNTER: Primary | ICD-10-CM

## 2018-07-25 DIAGNOSIS — M25.511 ACUTE PAIN OF RIGHT SHOULDER: ICD-10-CM

## 2018-07-25 PROCEDURE — 99214 OFFICE O/P EST MOD 30 MIN: CPT | Performed by: ORTHOPAEDIC SURGERY

## 2018-07-25 PROCEDURE — 73030 X-RAY EXAM OF SHOULDER: CPT

## 2018-07-25 NOTE — PATIENT INSTRUCTIONS
Exercises for Shoulder Abduction and Adduction   AMBULATORY CARE:   Shoulder abduction and adduction exercises  work the muscles at the back of your shoulder and your upper back  Before you exercise:  Warm up and stretch before you exercise  Walk or ride a stationary bike for 5 to 10 minutes to help you warm up  Stretching helps increase range of motion  It may also decrease muscle soreness and help prevent another injury  Your healthcare provider will tell you which of the following stretches to do:  · Crossover arm stretch:  Relax your shoulders  Hold your upper arm with the opposite hand  Pull your arm across your chest until you feel a stretch  Hold the stretch for 30 seconds  Return to the starting position  · Shoulder flexion stretch:  Stand facing a wall  Slowly walk your fingers up the wall until you feel a stretch  Hold the stretch for 30 seconds  Return to the starting position  · Sleeper stretch:  Lie on your injured side on a firm, flat surface  Bend the elbow of your injured arm 90° with your hand facing up  Use your arm that is not injured to slowly push your injured arm down  Stop when you feel a stretch at the back of your injured shoulder  Hold the stretch for 30 seconds  Slowly return to the starting position  Contact your healthcare provider if:   · You have sharp or worsening pain during exercise or at rest     · You have questions or concerns about your shoulder exercises  How to exercise with a weight:  Your healthcare provider will tell you how much weight to use  · Shoulder abduction:  Stand and hold a weight in your hand with your palm facing your body  Slowly raise your arm to the side with your thumb pointing up  Then raise your arm over your head as far as you can without pain  Hold this position for as long as directed  Do not raise your arm over your head unless your healthcare provider says it is okay  · Shoulder adduction:  Lie on your back on a firm surface   Extend your arm out to a "T " Bend your elbow so your forearm in the air  Hold a weight in your hand  Slowly raise your arm toward the ceiling and straighten your elbow  Hold this position for as long as directed  Slowly return to the starting position  How to exercise with an exercise band:   · Shoulder abduction:  Wrap the exercise band around a heavy, stable object near your foot  Grab the band with the hand of your injured shoulder  Keep your arm straight  Slowly raise your arm to the side with your thumb pointing up  Then, slowly pull the band over your head as far as you can without pain  Do not raise your arm over your head unless your healthcare provider says it is okay  Do not let your shoulder shrug  Hold this position for as long as directed  Slowly return to the starting position  · Shoulder adduction:  Wrap the exercise band around a heavy, stable object  Stand and face away from where the band is anchored  Hold each end of the band in both hands with your elbows bent  Your elbows should not be behind your body  Keep your arms parallel to the floor and slowly straighten your elbows  Hold this position for as long as directed  Slowly return to the starting position  Follow up with your physical therapist as directed:  Write down your questions so you remember to ask them at your visits  © 2017 [employer] Information is for End User's use only and may not be sold, redistributed or otherwise used for commercial purposes  All illustrations and images included in CareNotes® are the copyrighted property of [employer] , [employer]  or [employer]  The above information is an  only  It is not intended as medical advice for individual conditions or treatments  Talk to your doctor, nurse or pharmacist before following any medical regimen to see if it is safe and effective for you

## 2018-07-25 NOTE — PROGRESS NOTES
Chief Complaint  Right shoulder pain 5 months    History Of Presenting Illness  Jackie Sheppard 1980 presents with right shoulder pain last 5 months  Patient is a left handed [employer] [place]  Patient problems with his left shoulder which was treated by arthroscopic stabilization in May 2016  Left shoulder is asymptomatic  Patient was lifting his fileonardoe's 1year-old son onto a basketball rim in March 2018 during a competition  The child slipped and he grabbed him below his waist   Patient felt something jog in the right  shoulder  Since then patient has sense of instability and pain in the right shoulder  Not improving over time,  constant discomfort in the shoulder  Now interfering with activities of daily living      Current Medications  Current Outpatient Prescriptions   Medication Sig Dispense Refill    lisinopril-hydrochlorothiazide (PRINZIDE,ZESTORETIC) 10-12 5 MG per tablet Take 1 tablet by mouth daily 30 tablet 5     No current facility-administered medications for this visit          Current Problems    Active Problems:   Patient Active Problem List    Diagnosis Date Noted    Bicuspid aortic valve 11/22/2017    Essential hypertension 11/22/2017    SLAP tear of shoulder 05/26/2016    Hypercholesterolemia 01/02/2015         Review of Systems:    General: negative for - chills, fatigue, fever,  weight gain or weight loss  Psychological: negative for - anxiety, behavioral disorder, concentration difficulties  Ophthalmic: negative for - blurry vision, decreased vision, double vision,  Positive for frequent urination and joint pain    Past Medical History:   Past Medical History:   Diagnosis Date    Aortic valve cusp abnormality     "bicuspid aortic valve"    Hypertension        Past Surgical History:   Past Surgical History:   Procedure Laterality Date    ANTERIOR CRUCIATE LIGAMENT REPAIR Right     OTHER SURGICAL HISTORY      SAE    [place] SHLDR ARTHROSCOP,SURG,REPAIR,SLAP LESION Left 5/26/2016    Procedure: SHOULDER ARTHROSCOPY SLAP REPAIR;  Surgeon: James Mullins MD;  Location: [facility];  Service: Orthopedics    VASECTOMY         Family History:  Family history reviewed and non-contributory  Family History   Problem Relation Age of Onset    Hypertension Father     Lung cancer Maternal Grandfather     Other Paternal Grandmother         Carotid Stenosis     Hyperlipidemia Paternal Grandmother     Hypertension Paternal Grandmother     Stroke Paternal Grandmother     Coronary artery disease Paternal Grandfather     Hyperlipidemia Paternal Grandfather     Hypertension Paternal Grandfather     Diabetes type II Paternal Grandfather        Social History:  Social History     Social History    Marital status:      Spouse name: N/A    Number of children: N/A    Years of education: N/A     Social History Main Topics    Smoking status: Never Smoker    Smokeless tobacco: Never Used    Alcohol use Yes      Comment: socially    Drug use: No    Sexual activity: Yes     Other Topics Concern    None     Social History Narrative    Always uses seat belt     Caffeine Use     Dental care regularly             Allergies:    Allergies   Allergen Reactions    Penicillins Hives           Physical ExaminationBP 142/85   Pulse 60   Ht 6' 1 5" (1 867 m)   Wt 134 kg (296 lb)   BMI 38 52 kg/m²   Gen: Alert and oriented to person, place, time  HEENT: EOMI, eyes clear, moist mucus membranes, hearing intact      Orthopedic Exam  Right shoulder no obvious swelling or deformity  Tenderness present over the anterior joint line  Apprehension positive  Radiographs normal          Impression  Right shoulder slap tear      Plan    Discussed treatment with the patient  Patient will start a physical therapy program  Patient will ice use over-the-counter pain medication  Patient will return with an MRI arthrogram right shoulder    James Mullins MD        Portions of the record may have been created with voice recognition software   Occasional wrong word or "sound a like" substitutions may have occurred due to the inherent limitations of voice recognition software   Read the chart carefully and recognize, using context, where substitutions have occurred

## 2018-08-01 ENCOUNTER — EVALUATION (OUTPATIENT)
Dept: PHYSICAL THERAPY | Facility: CLINIC | Age: 38
End: 2018-08-01
Payer: COMMERCIAL

## 2018-08-01 DIAGNOSIS — S43.431A SUPERIOR GLENOID LABRUM LESION OF RIGHT SHOULDER, INITIAL ENCOUNTER: Primary | ICD-10-CM

## 2018-08-01 PROCEDURE — 97535 SELF CARE MNGMENT TRAINING: CPT | Performed by: PHYSICAL THERAPIST

## 2018-08-01 PROCEDURE — 97010 HOT OR COLD PACKS THERAPY: CPT | Performed by: PHYSICAL THERAPIST

## 2018-08-01 PROCEDURE — G8984 CARRY CURRENT STATUS: HCPCS | Performed by: PHYSICAL THERAPIST

## 2018-08-01 PROCEDURE — G8985 CARRY GOAL STATUS: HCPCS | Performed by: PHYSICAL THERAPIST

## 2018-08-01 PROCEDURE — 97110 THERAPEUTIC EXERCISES: CPT | Performed by: PHYSICAL THERAPIST

## 2018-08-01 PROCEDURE — 97140 MANUAL THERAPY 1/> REGIONS: CPT | Performed by: PHYSICAL THERAPIST

## 2018-08-01 PROCEDURE — 97161 PT EVAL LOW COMPLEX 20 MIN: CPT | Performed by: PHYSICAL THERAPIST

## 2018-08-01 NOTE — PROGRESS NOTES
PT Evaluation     Today's date: 2018  Patient name: Ann Ye  : 1980  MRN: [id number]  Referring provider: Celio Velasquez MD  Dx:   Encounter Diagnosis     ICD-10-CM    1  Superior glenoid labrum lesion of right shoulder, initial encounter S43 431A Ambulatory referral to Physical Therapy                  Assessment  Understanding of Dx/Px/POC: good   Prognosis: good    Goals  STGs: To be complete within 4 weeks  - Decrease pain to < 2/10 at worst  - Increase AROM to WNL  - Increase strength to > 4+/5  - Improve postural awareness capacity to > 60min before deficit    LTGs: To be complete within 6 weeks  - Able to repetitively complete all overhead activity without pain or limitation for increased safety and functional capacity with ADLs and work-related duty  - Able to repetitively complete all reaching activity without pain or limitation for increased safety and functional capacity with ADLs and work-related duty  - Able to repetitively complete all pushing/pulling activity without pain or limitation for increased safety and functional capacity with ADLs and work-related duty  - Able to complete all lifting/carrying activity without pain or limitation for increased safety and functional capacity with ADLs and work-related duty    Plan  Frequency: 3x week  Duration in weeks: 4     Pt is a 45 y o  male with chronic R shoulder pain who presents with functional deficits including decreased capacity with overhead activity, pushing/pulling, lifting/carrying, and behind the back/cross body reaching activity  Upon completion of today's initial evaluation, Juan Diego's sx remain consistent with R Shoulder subacromial impingement secondary to MDI and RTC tendonopathy  Patient will benefit from skilled physical therapy to address current deficits      Subjective Evaluation    Pain  Current pain ratin  At best pain ratin  At worst pain ratin  Location: R Shoulder       Pt reports he has had R Shoulder pain since March, 2018  Pt reports the pain has continued to worsen to point where it is negatively effecting his overall safely and functional capacity with ADLs and work-related duty  Pt reports he is getting an MRI next week      Objective Pain level ranges 2-8/10  AROM: IR 50 degrees, horizontal Add 40 degrees  Strength: Abd, ER 3+/5; 4/5 t/o  Postural Awareness: Poor (rounded shoulders, forward head)  Unable to complete overhead activity without pain and limitation  Unable to complete pushing/pulling activity without pain and limitation  Unable to complete lifting/carrying activity without pain and limitation  Unable to complete cross body/behind the back reaching activity without pain and limitation    Flowsheet Rows      Most Recent Value   PT/OT G-Codes   Current Score  65   Projected Score  78   FOTO information reviewed  Yes   Assessment Type  Evaluation   G code set  Carrying, Moving & Handling Objects   Carrying, Moving and Handling Objects Current Status ()  CK   Carrying, Moving and Handling Objects Goal Status ()  CH          Precautions: None at this time    Daily Treatment Diary    HEP: Sheet provided and discussed    Manual  8/1/18            ART x25'            IASTM             JM             PROM and UE stretch             STM/Triggerpoint               Exercise Diary  8/1/18            Pendulums             Pullies             Wand ER/Flx             Table Slides             Front Wall Slides             Lat Wall Slides             T-Band: Row             TB LTP             TB No Moneys 3x10 L4            Prone Scaption             Prone Abd, Scap 3x10 1#            Prone scap retract with ER             SL Er/ ABD 3x10 1#            T-Band ER             AROM Scaption             UBE: Retro             Scap Retraction 3x10            Scap Depression 3x10            TB ER 3x10 L4            TB 45 degree Sh  Abd 3x10 L4                                                   Body Blade                 Modalities  8/1/18            RAMAN             CP x12'            Stim

## 2018-08-07 ENCOUNTER — HOSPITAL ENCOUNTER (OUTPATIENT)
Dept: RADIOLOGY | Facility: HOSPITAL | Age: 38
Discharge: HOME | End: 2018-08-07
Attending: ORTHOPAEDIC SURGERY | Admitting: RADIOLOGY
Payer: COMMERCIAL

## 2018-08-07 ENCOUNTER — HOSPITAL ENCOUNTER (OUTPATIENT)
Dept: MRI IMAGING | Facility: HOSPITAL | Age: 38
Discharge: HOME | End: 2018-08-07
Attending: ORTHOPAEDIC SURGERY
Payer: COMMERCIAL

## 2018-08-07 DIAGNOSIS — S43.431A SUPERIOR GLENOID LABRUM LESION OF RIGHT SHOULDER, INITIAL ENCOUNTER: ICD-10-CM

## 2018-08-07 PROCEDURE — 73222 MRI JOINT UPR EXTREM W/DYE: CPT

## 2018-08-07 PROCEDURE — 77002 NEEDLE LOCALIZATION BY XRAY: CPT

## 2018-08-07 PROCEDURE — A9585 GADOBUTROL INJECTION: HCPCS | Performed by: ORTHOPAEDIC SURGERY

## 2018-08-07 PROCEDURE — 23350 INJECTION FOR SHOULDER X-RAY: CPT

## 2018-08-07 RX ORDER — LIDOCAINE HYDROCHLORIDE 10 MG/ML
5 INJECTION, SOLUTION INFILTRATION; PERINEURAL
Status: COMPLETED | OUTPATIENT
Start: 2018-08-07 | End: 2018-08-07

## 2018-08-07 RX ORDER — 0.9 % SODIUM CHLORIDE 0.9 %
5 VIAL (ML) INJECTION
Status: COMPLETED | OUTPATIENT
Start: 2018-08-07 | End: 2018-08-07

## 2018-08-07 RX ADMIN — GADOBUTROL 0.2 ML: 604.72 INJECTION INTRAVENOUS at 10:02

## 2018-08-07 RX ADMIN — IOHEXOL 50 ML: 240 INJECTION, SOLUTION INTRATHECAL; INTRAVASCULAR; INTRAVENOUS; ORAL at 10:02

## 2018-08-07 RX ADMIN — LIDOCAINE HYDROCHLORIDE 5 ML: 10 INJECTION, SOLUTION INFILTRATION; PERINEURAL at 10:02

## 2018-08-07 RX ADMIN — SODIUM CHLORIDE 5 ML: 9 INJECTION, SOLUTION INTRAMUSCULAR; INTRAVENOUS; SUBCUTANEOUS at 10:02

## 2018-08-09 ENCOUNTER — OFFICE VISIT (OUTPATIENT)
Dept: PHYSICAL THERAPY | Facility: CLINIC | Age: 38
End: 2018-08-09
Payer: COMMERCIAL

## 2018-08-09 DIAGNOSIS — S43.431A SUPERIOR GLENOID LABRUM LESION OF RIGHT SHOULDER, INITIAL ENCOUNTER: Primary | ICD-10-CM

## 2018-08-09 PROCEDURE — 97140 MANUAL THERAPY 1/> REGIONS: CPT | Performed by: PHYSICAL THERAPIST

## 2018-08-09 PROCEDURE — 97010 HOT OR COLD PACKS THERAPY: CPT | Performed by: PHYSICAL THERAPIST

## 2018-08-09 PROCEDURE — 97110 THERAPEUTIC EXERCISES: CPT | Performed by: PHYSICAL THERAPIST

## 2018-08-09 PROCEDURE — 97530 THERAPEUTIC ACTIVITIES: CPT | Performed by: PHYSICAL THERAPIST

## 2018-08-09 PROCEDURE — 97112 NEUROMUSCULAR REEDUCATION: CPT | Performed by: PHYSICAL THERAPIST

## 2018-08-09 NOTE — PROGRESS NOTES
Daily Note     Today's date: 2018  Patient name: Augie Fox  : 1980  MRN: [id number]  Referring provider: Rhonda Santos MD  Dx:   Encounter Diagnosis     ICD-10-CM    1  Superior glenoid labrum lesion of right shoulder, initial encounter S43 431A                   Subjective: Pt reports not much change since IE  HEP going well  Anxious to continue making progress  Objective: See treatment diary below      Assessment: Tolerated treatment well  Patient demonstrated fatigue post treatment, exhibited good technique with therapeutic exercises and would benefit from continued PT      Plan: Continue per plan of care  Progress treatment as tolerated        Precautions: None at this time    Daily Treatment Diary    HEP: Sheet provided and discussed    Manual  18            ART x20'            IASTM             JM             PROM and UE stretch             STM/Triggerpoint               Exercise Diary  18            Pendulums             Pullies             Wand ER/Flx             Table Slides             Front Wall Slides             Lat Wall Slides             T-Band: Row             TB LTP             TB No Moneys 3x10 L4            Prone Scaption             Prone Abd, Scap 3x10 1#            Prone scap retract with ER             SL Er/ ABD 3x10 1#            T-Band ER             AROM Scaption             UBE: Retro x5'            Scap Retraction 3x10            Scap Depression 3x10            TB ER 3x10 L4            TB 45 degree Sh  Abd 3x10 L4                                                   Body Blade                 Modalities  18            MHP             CP x12'            Stim

## 2018-08-14 ENCOUNTER — OFFICE VISIT (OUTPATIENT)
Dept: PHYSICAL THERAPY | Facility: CLINIC | Age: 38
End: 2018-08-14
Payer: COMMERCIAL

## 2018-08-14 DIAGNOSIS — S43.431A SUPERIOR GLENOID LABRUM LESION OF RIGHT SHOULDER, INITIAL ENCOUNTER: Primary | ICD-10-CM

## 2018-08-14 PROCEDURE — 97140 MANUAL THERAPY 1/> REGIONS: CPT

## 2018-08-14 PROCEDURE — 97010 HOT OR COLD PACKS THERAPY: CPT

## 2018-08-14 PROCEDURE — 97110 THERAPEUTIC EXERCISES: CPT

## 2018-08-14 NOTE — PROGRESS NOTES
Daily Note     Today's date: 2018  Patient name: Dandy Chowdhury  : 1980  MRN: [id number]  Referring provider: Ashlyn Blankenship MD  Dx:   Encounter Diagnosis     ICD-10-CM    1  Superior glenoid labrum lesion of right shoulder, initial encounter S43 431A                   Subjective: Pt reports intermittent pain  States having good and bad days  Objective: See treatment diary below      Assessment: Tolerated treatment well  Patient demonstrated fatigue post treatment  Pt able to complete progressions with min increased pain however had notable fatigue  Pt simona program well  Plan: Continue per plan of care       Precautions: None at this time     Daily Treatment Diary     HEP: Sheet provided and discussed     Manual  18                   ART x20'                     IASTM                       JM                       PROM and UE stretch    15'                   STM/Triggerpoint                          Exercise Diary  18                   Pendulums                       Pullies                       Wand ER/Flx                       Table Slides                       Front Wall Slides                       Lat Wall Slides                       T-Band: Row    L4 3/10                   TB LTP    L4 3/10                   TB No Moneys 3x10 L4  L4 3/10                   Prone Scaption                       Prone Abd, Scap 3x10 1#  3/10 2#                   Prone scap retract with ER                       SL Er/ ABD 3x10 1#  2# 3/10                   T-Band ER    L4 3/10                   AROM Scaption                       UBE: Retro x5'  5'                   Scap Retraction 3x10  3/10                   Scap Depression 3x10  3/10                   TB ER 3x10 L4  L4 3/10                   TB 45 degree Sh  Abd 3x10 L4  L4 3/10                    TB D2   L3 3/10                    TB 90/90   L3 3/10                                           Body Blade                           Modalities  8/9/18 8/14/18                   RAMAN                       CP x12'  10'                   Stim

## 2018-08-16 ENCOUNTER — OFFICE VISIT (OUTPATIENT)
Dept: PHYSICAL THERAPY | Facility: CLINIC | Age: 38
End: 2018-08-16
Payer: COMMERCIAL

## 2018-08-16 DIAGNOSIS — S43.431A SUPERIOR GLENOID LABRUM LESION OF RIGHT SHOULDER, INITIAL ENCOUNTER: Primary | ICD-10-CM

## 2018-08-16 PROCEDURE — 97110 THERAPEUTIC EXERCISES: CPT

## 2018-08-16 PROCEDURE — 97140 MANUAL THERAPY 1/> REGIONS: CPT

## 2018-08-16 PROCEDURE — 97010 HOT OR COLD PACKS THERAPY: CPT

## 2018-08-16 NOTE — PROGRESS NOTES
Daily Note     Today's date: 2018  Patient name: Joleen Samano  : 1980  MRN: [id number]  Referring provider: Maggie Buenrostro MD  Dx:   Encounter Diagnosis     ICD-10-CM    1  Superior glenoid labrum lesion of right shoulder, initial encounter S43 431A                   Subjective: Reports mild soreness with exercises  States he is doing well  Objective: See treatment diary below      Assessment: Tolerated treatment well  Patient demonstrated fatigue post treatment  Pt with c/o pinching and discomfort with SL abd  Pt able to complete remainder of program with mild increased shoulder soreness  Plan: Continue per plan of care       Precautions: None at this time     Daily Treatment Diary     HEP: Sheet provided and discussed     Manual  18                 ART x20'                     IASTM                       JM                       PROM and UE stretch    15'  15'                 STM/Triggerpoint                          Exercise Diary  18                Pendulums                     Pullies                     Wand ER/Flx                     Table Slides                     Front Wall Slides                     Lat Wall Slides                     T-Band: Row    L4 3/10  L5 3/10               TB LTP    L4 3/10  L5 3/10               TB No Moneys 3x10 L4  L4 3/10  L4 3/10               Prone Scaption                     Prone Abd, Scap 3x10 1#  3/10 2#  3/10 2#               Prone scap retract with ER                     SL Er/ ABD 3x10 1#  2# 3/10  2# 3/10               T-Band ER    L4 3/10  L4 3/10               AROM Scaption                     UBE: Retro x5'  5'  5'               Scap Retraction 3x10  3/10  3/10               Scap Depression 3x10  3/10  3/10               TB ER 3x10 L4  L4 3/10 L4 3/10               TB 45 degree Sh  Abd 3x10 L4  L4 3/10  L4 3/10                TB D2   L3 3/10 L3 3/10                TB 90/90   L3 3/10  L3 3/10                                     Body Blade      4/20"                     Modalities  8/9/18 8/14/18 8/16/18                 MHP                       CP x12'  10'  10'                 Stim

## 2018-08-22 ENCOUNTER — OFFICE VISIT (OUTPATIENT)
Dept: OBGYN CLINIC | Facility: CLINIC | Age: 38
End: 2018-08-22
Payer: COMMERCIAL

## 2018-08-22 VITALS
DIASTOLIC BLOOD PRESSURE: 82 MMHG | BODY MASS INDEX: 38.65 KG/M2 | WEIGHT: 301.2 LBS | HEART RATE: 65 BPM | SYSTOLIC BLOOD PRESSURE: 130 MMHG | HEIGHT: 74 IN | RESPIRATION RATE: 14 BRPM

## 2018-08-22 DIAGNOSIS — S43.431D LABRAL TEAR OF SHOULDER, RIGHT, SUBSEQUENT ENCOUNTER: Primary | ICD-10-CM

## 2018-08-22 PROCEDURE — 99213 OFFICE O/P EST LOW 20 MIN: CPT | Performed by: ORTHOPAEDIC SURGERY

## 2018-08-22 RX ORDER — CHLORHEXIDINE GLUCONATE 4 G/100ML
SOLUTION TOPICAL DAILY PRN
Status: CANCELLED | OUTPATIENT
Start: 2018-08-22

## 2018-08-22 RX ORDER — CLINDAMYCIN PHOSPHATE 900 MG/50ML
900 INJECTION INTRAVENOUS ONCE
Status: CANCELLED | OUTPATIENT
Start: 2018-11-26 | End: 2018-08-22

## 2018-08-22 NOTE — PROGRESS NOTES
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 and therefore would like to schedule surgical repair  He understands risks and benefits of surgery as he had this done previously to his contralateral side  He denies numbness or tingling  Review of Systems  Review of systems negative unless otherwise specified in HPI    Past Medical History  Past Medical History:   Diagnosis Date    Aortic valve cusp abnormality     "bicuspid aortic valve"    Hypertension      Past Surgical History  Past Surgical History:   Procedure Laterality Date    ANTERIOR CRUCIATE LIGAMENT REPAIR Right     OTHER SURGICAL HISTORY      SAE    MN SHLDR ARTHROSCOP,SURG,REPAIR,SLAP LESION Left 5/26/2016    Procedure: SHOULDER ARTHROSCOPY SLAP REPAIR;  Surgeon: Hortencia Nguyen MD;  Location: [facility];  Service: Orthopedics    VASECTOMY       Current Medications  Current Outpatient Prescriptions on File Prior to Visit   Medication Sig Dispense Refill    lisinopril-hydrochlorothiazide (PRINZIDE,ZESTORETIC) 10-12 5 MG per tablet Take 1 tablet by mouth daily 30 tablet 5     No current facility-administered medications on file prior to visit        Recent Labs [facility])    0  Lab Value Date/Time   HCT 45 11/28/2016 1950   HGB 15 3 11/28/2016 1950   GLUCOSE 98 11/28/2016 1950     Physical exam  · General: Awake, Alert, Oriented, stocky  · Eyes: Pupils equal, round and reactive to light  · Heart: regular rate and rhythm without murmur  · Lungs: No audible wheezing clear to auscultation  · Abdomen: soft nontender  right Shoulder  ·  obvious swelling or deformity  Tenderness over the anterior joint line  Positive apprehension sign radial pulse 4/4  Positive clicking with manipulated maneuvers and discomfort  Procedure   none    Imaging   MRI notes near circumferential tear of the glenoid labrum right shoulder with the biceps anchor maintained and no significant arthritis  1  Labral tear of shoulder, right, subsequent encounter      Assessment:  right  Shoulder labral tear near circumferential with the superior and biceps anchor remained attached  Plan:   Risks and benefits of surgery were discussed with the patient full and consent was obtained in the office  Will schedule surgery and see the patient back in postop phase

## 2018-08-22 NOTE — H&P
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 and therefore would like to schedule surgical repair  He understands risks and benefits of surgery as he had this done previously to his contralateral side  He denies numbness or tingling  Review of Systems  Review of systems negative unless otherwise specified in HPI    Past Medical History  Past Medical History:   Diagnosis Date    Aortic valve cusp abnormality     "bicuspid aortic valve"    Hypertension      Past Surgical History  Past Surgical History:   Procedure Laterality Date    ANTERIOR CRUCIATE LIGAMENT REPAIR Right     OTHER SURGICAL HISTORY      SAE    KS SHLDR ARTHROSCOP,SURG,REPAIR,SLAP LESION Left 5/26/2016    Procedure: SHOULDER ARTHROSCOPY SLAP REPAIR;  Surgeon: Constanza Shah MD;  Location: [facility];  Service: Orthopedics    VASECTOMY       Current Medications  Current Outpatient Prescriptions on File Prior to Visit   Medication Sig Dispense Refill    lisinopril-hydrochlorothiazide (PRINZIDE,ZESTORETIC) 10-12 5 MG per tablet Take 1 tablet by mouth daily 30 tablet 5     No current facility-administered medications on file prior to visit        Recent Labs [facility])    0  Lab Value Date/Time   HCT 45 11/28/2016 1950   HGB 15 3 11/28/2016 1950   GLUCOSE 98 11/28/2016 1950     Physical exam  · General: Awake, Alert, Oriented, stocky  · Eyes: Pupils equal, round and reactive to light  · Heart: regular rate and rhythm without murmur  · Lungs: No audible wheezing clear to auscultation  · Abdomen: soft nontender  right Shoulder  ·  obvious swelling or deformity  Tenderness over the anterior joint line  Positive apprehension sign radial pulse 4/4  Positive clicking with manipulated maneuvers and discomfort  Procedure   none    Imaging   MRI notes near circumferential tear of the glenoid labrum right shoulder with the biceps anchor maintained and no significant arthritis  1  Labral tear of shoulder, right, subsequent encounter      Assessment:  right  Shoulder labral tear near circumferential with the superior and biceps anchor remained attached  Plan:   Risks and benefits of surgery were discussed with the patient full and consent was obtained in the office  Will schedule surgery and see the patient back in postop phase

## 2018-08-22 NOTE — PATIENT INSTRUCTIONS
Risks and benefits of surgery were discussed with the patient full and consent was obtained in the office  Will schedule surgery and see the patient back in postop phase      Pre-Surgery Instructions:   Medication Instructions    lisinopril-hydrochlorothiazide (PRINZIDE,ZESTORETIC) 10-12 5 MG per tablet per anesthesia guidelines

## 2018-08-27 PROBLEM — S43.431D LABRAL TEAR OF SHOULDER, RIGHT, SUBSEQUENT ENCOUNTER: Status: ACTIVE | Noted: 2018-08-27

## 2018-09-04 NOTE — PROGRESS NOTES
Patient has not reached out to schedule since 8/16/2018  Pt will be D/C from physical therapy unless otherwise notified

## 2018-10-19 ENCOUNTER — OFFICE VISIT (OUTPATIENT)
Dept: INTERNAL MEDICINE CLINIC | Facility: CLINIC | Age: 38
End: 2018-10-19
Payer: COMMERCIAL

## 2018-10-19 VITALS
WEIGHT: 304.13 LBS | HEART RATE: 65 BPM | OXYGEN SATURATION: 98 % | HEIGHT: 74 IN | BODY MASS INDEX: 39.03 KG/M2 | TEMPERATURE: 96.2 F | SYSTOLIC BLOOD PRESSURE: 132 MMHG | DIASTOLIC BLOOD PRESSURE: 68 MMHG

## 2018-10-19 DIAGNOSIS — Z23 NEED FOR INFLUENZA VACCINATION: ICD-10-CM

## 2018-10-19 DIAGNOSIS — S43.431D LABRAL TEAR OF SHOULDER, RIGHT, SUBSEQUENT ENCOUNTER: Primary | ICD-10-CM

## 2018-10-19 DIAGNOSIS — I10 ESSENTIAL HYPERTENSION: ICD-10-CM

## 2018-10-19 PROCEDURE — 99395 PREV VISIT EST AGE 18-39: CPT | Performed by: INTERNAL MEDICINE

## 2018-10-19 PROCEDURE — 90471 IMMUNIZATION ADMIN: CPT

## 2018-10-19 PROCEDURE — 90686 IIV4 VACC NO PRSV 0.5 ML IM: CPT

## 2018-10-19 NOTE — PROGRESS NOTES
Assessment/Plan:         Diagnoses and all orders for this visit:    Labral tear of shoulder, right, subsequent encounter  Pt to have surgery end of November and tentative plan for 12 weeks off     Need for influenza vaccination  -     SYRINGE/SINGLE-DOSE VIAL: influenza vaccine, [id number], quadrivalent, 0 5 mL, preservative-free, for patients 3+ yr (FLUZONE)  Flu shot today    Preventive Health Exam  Wellness for for work completed  Flu shot today  He does exercise but limited now due to shoulder injury which will improve once surgery/recovery completed  Dental visit and eye exam UTD      Essential hypertension  BP stable continue present RX   No added salt diet  He will do some home exercises    Rto  6 months/prn       Patient ID: Fabiano Aguirre is a 45 y o  male  HPI   Pt generally well  No chest pain or sob He has been limited in exercise and will have shoulder surgery end of November  He wants flu shot today Tolerating BP rx well        Review of Systems   Constitutional: Negative  HENT: Negative  Eyes: Negative  Respiratory: Negative  Negative for shortness of breath  Cardiovascular: Negative for chest pain and palpitations  Gastrointestinal: Negative  Endocrine: Negative  Genitourinary: Negative  Musculoskeletal: Negative  Negative for neck pain  Skin: Negative  Allergic/Immunologic: Negative  Neurological: Positive for headaches  Hematological: Negative  Psychiatric/Behavioral: Negative        Past Medical History:   Diagnosis Date    Aortic valve cusp abnormality     "bicuspid aortic valve"    Hypertension      Past Surgical History:   Procedure Laterality Date    ANTERIOR CRUCIATE LIGAMENT REPAIR Right     OTHER SURGICAL HISTORY      SAE    [place] SHLDR ARTHROSCOP,SURG,REPAIR,SLAP LESION Left 5/26/2016    Procedure: SHOULDER ARTHROSCOPY SLAP REPAIR;  Surgeon: Ana Maria Knight MD;  Location: [facility];  Service: Orthopedics    VASECTOMY       Social History Social History    Marital status:      Spouse name: N/A    Number of children: N/A    Years of education: N/A     Occupational History    Not on file  Social History Main Topics    Smoking status: Never Smoker    Smokeless tobacco: Never Used    Alcohol use Yes      Comment: socially    Drug use: No    Sexual activity: Yes     Other Topics Concern    Not on file     Social History Narrative    Always uses seat belt     Caffeine Use     Dental care regularly           Allergies   Allergen Reactions    Penicillins Hives             /68   Pulse 65   Temp (!) 96 2 °F (35 7 °C) (Tympanic)   Ht 6' 1 5" (1 867 m)   Wt (!) 138 kg (304 lb 2 oz)   SpO2 98%   BMI 39 58 kg/m²          Physical Exam   Constitutional: He is oriented to person, place, and time  He appears well-developed and well-nourished  No distress  HENT:   Head: Normocephalic and atraumatic  Right Ear: External ear normal    Left Ear: External ear normal    Nose: Nose normal    Mouth/Throat: Oropharynx is clear and moist  No oropharyngeal exudate  Eyes: Pupils are equal, round, and reactive to light  Conjunctivae and EOM are normal  No scleral icterus  Neck: Normal range of motion  Neck supple  Cardiovascular: Normal rate and regular rhythm  Pulmonary/Chest: Effort normal and breath sounds normal    Abdominal: Soft  Bowel sounds are normal    Musculoskeletal: Normal range of motion  He exhibits deformity  Decreased rom right upper extremity   Neurological: He is alert and oriented to person, place, and time  He has normal reflexes  No cranial nerve deficit  He exhibits abnormal muscle tone  Skin: Skin is warm and dry  He is not diaphoretic  Psychiatric: He has a normal mood and affect  His behavior is normal  Judgment and thought content normal    Nursing note and vitals reviewed

## 2018-10-19 NOTE — PATIENT INSTRUCTIONS

## 2018-10-31 ENCOUNTER — APPOINTMENT (OUTPATIENT)
Dept: LAB | Facility: MEDICAL CENTER | Age: 38
End: 2018-10-31
Payer: COMMERCIAL

## 2018-10-31 DIAGNOSIS — S43.431D LABRAL TEAR OF SHOULDER, RIGHT, SUBSEQUENT ENCOUNTER: ICD-10-CM

## 2018-10-31 LAB
ANION GAP SERPL CALCULATED.3IONS-SCNC: 6 MMOL/L (ref 4–13)
APTT PPP: 33 SECONDS (ref 24–36)
BASOPHILS # BLD AUTO: 0.12 THOUSANDS/ΜL (ref 0–0.1)
BASOPHILS NFR BLD AUTO: 1 % (ref 0–1)
BUN SERPL-MCNC: 17 MG/DL (ref 5–25)
CALCIUM SERPL-MCNC: 8.7 MG/DL (ref 8.3–10.1)
CHLORIDE SERPL-SCNC: 102 MMOL/L (ref 100–108)
CO2 SERPL-SCNC: 26 MMOL/L (ref 21–32)
CREAT SERPL-MCNC: 1.05 MG/DL (ref 0.6–1.3)
EOSINOPHIL # BLD AUTO: 0.26 THOUSAND/ΜL (ref 0–0.61)
EOSINOPHIL NFR BLD AUTO: 3 % (ref 0–6)
ERYTHROCYTE [DISTWIDTH] IN BLOOD BY AUTOMATED COUNT: 12.3 % (ref 11.6–15.1)
GFR SERPL CREATININE-BSD FRML MDRD: 90 ML/MIN/1.73SQ M
GLUCOSE P FAST SERPL-MCNC: 88 MG/DL (ref 65–99)
HCT VFR BLD AUTO: 43.6 % (ref 36.5–49.3)
HGB BLD-MCNC: 14.8 G/DL (ref 12–17)
IMM GRANULOCYTES # BLD AUTO: 0.05 THOUSAND/UL (ref 0–0.2)
IMM GRANULOCYTES NFR BLD AUTO: 1 % (ref 0–2)
INR PPP: 1.01 (ref 0.86–1.17)
LYMPHOCYTES # BLD AUTO: 2.57 THOUSANDS/ΜL (ref 0.6–4.47)
LYMPHOCYTES NFR BLD AUTO: 30 % (ref 14–44)
MCH RBC QN AUTO: 29.1 PG (ref 26.8–34.3)
MCHC RBC AUTO-ENTMCNC: 33.9 G/DL (ref 31.4–37.4)
MCV RBC AUTO: 86 FL (ref 82–98)
MONOCYTES # BLD AUTO: 0.83 THOUSAND/ΜL (ref 0.17–1.22)
MONOCYTES NFR BLD AUTO: 10 % (ref 4–12)
NEUTROPHILS # BLD AUTO: 4.87 THOUSANDS/ΜL (ref 1.85–7.62)
NEUTS SEG NFR BLD AUTO: 55 % (ref 43–75)
NRBC BLD AUTO-RTO: 0 /100 WBCS
PLATELET # BLD AUTO: 242 THOUSANDS/UL (ref 149–390)
PMV BLD AUTO: 11.3 FL (ref 8.9–12.7)
POTASSIUM SERPL-SCNC: 3.9 MMOL/L (ref 3.5–5.3)
PROTHROMBIN TIME: 13.4 SECONDS (ref 11.8–14.2)
RBC # BLD AUTO: 5.08 MILLION/UL (ref 3.88–5.62)
SODIUM SERPL-SCNC: 134 MMOL/L (ref 136–145)
WBC # BLD AUTO: 8.7 THOUSAND/UL (ref 4.31–10.16)

## 2018-10-31 PROCEDURE — 80048 BASIC METABOLIC PNL TOTAL CA: CPT

## 2018-10-31 PROCEDURE — 36415 COLL VENOUS BLD VENIPUNCTURE: CPT

## 2018-10-31 PROCEDURE — 85730 THROMBOPLASTIN TIME PARTIAL: CPT

## 2018-10-31 PROCEDURE — 85610 PROTHROMBIN TIME: CPT

## 2018-10-31 PROCEDURE — 85025 COMPLETE CBC W/AUTO DIFF WBC: CPT

## 2018-11-19 NOTE — PRE-PROCEDURE INSTRUCTIONS
Pre-Surgery Instructions:   Medication Instructions    lisinopril-hydrochlorothiazide (PRINZIDE,ZESTORETIC) 10-12 5 MG per tablet Instructed patient per Anesthesia Guidelines

## 2018-11-24 ENCOUNTER — ANESTHESIA EVENT (OUTPATIENT)
Dept: PERIOP | Facility: HOSPITAL | Age: 38
End: 2018-11-24
Payer: COMMERCIAL

## 2018-11-25 NOTE — ANESTHESIA PREPROCEDURE EVALUATION
Review of Systems/Medical History          Cardiovascular  EKG reviewed, Exercise tolerance (METS): >4,  Hyperlipidemia, Hypertension on > 1 medication, Valvular heart disease (Bicuspid Aortic Valve) ,    Pulmonary  Not a smoker , No sleep apnea ,        GI/Hepatic            Endo/Other    Obesity    GYN       Hematology   Musculoskeletal       Neurology   Psychology           Physical Exam    Airway    Mallampati score: II  TM Distance: >3 FB  Neck ROM: full     Dental   No notable dental hx     Cardiovascular      Pulmonary      Other Findings        Anesthesia Plan  ASA Score- 3     Anesthesia Type- general and regional with ASA Monitors  Additional Monitors:   Airway Plan: ETT  Comment: GA with ISB  Plan Factors-Patient not instructed to abstain from smoking on day of procedure  Patient did not smoke on day of surgery  Induction- intravenous  Postoperative Plan- Plan for postoperative opioid use  Informed Consent- Anesthetic plan and risks discussed with patient and spouse  I personally reviewed this patient with the CRNA  Discussed and agreed on the Anesthesia Plan with the CRNA  Michael Spicer Discussed with Patient the procedure for ISB, side effects including extended numbness of the extremity and potential for an incomplete Block  All questions answered  Consent given        Lab Results   Component Value Date    ALT 24 02/02/2018    AST 13 02/02/2018    BUN 17 10/31/2018    CALCIUM 8 7 10/31/2018     10/31/2018    CO2 26 10/31/2018    CREATININE 1 05 10/31/2018    HDL 44 02/02/2018    INR 1 01 10/31/2018    HCT 43 6 10/31/2018    HGB 14 8 10/31/2018     10/31/2018    K 3 9 10/31/2018    TRIG 124 02/02/2018    WBC 8 70 10/31/2018

## 2018-11-26 ENCOUNTER — TELEPHONE (OUTPATIENT)
Dept: OBGYN CLINIC | Facility: HOSPITAL | Age: 38
End: 2018-11-26

## 2018-11-26 ENCOUNTER — HOSPITAL ENCOUNTER (OUTPATIENT)
Facility: HOSPITAL | Age: 38
Setting detail: OUTPATIENT SURGERY
Discharge: HOME | End: 2018-11-26
Attending: ORTHOPAEDIC SURGERY | Admitting: ORTHOPAEDIC SURGERY
Payer: COMMERCIAL

## 2018-11-26 ENCOUNTER — ANESTHESIA (OUTPATIENT)
Dept: PERIOP | Facility: HOSPITAL | Age: 38
End: 2018-11-26
Payer: COMMERCIAL

## 2018-11-26 VITALS
RESPIRATION RATE: 18 BRPM | TEMPERATURE: 98.5 F | OXYGEN SATURATION: 97 % | HEART RATE: 88 BPM | SYSTOLIC BLOOD PRESSURE: 146 MMHG | DIASTOLIC BLOOD PRESSURE: 77 MMHG

## 2018-11-26 DIAGNOSIS — S43.431D LABRAL TEAR OF SHOULDER, RIGHT, SUBSEQUENT ENCOUNTER: Primary | ICD-10-CM

## 2018-11-26 PROCEDURE — C1713 ANCHOR/SCREW BN/BN,TIS/BN: HCPCS | Performed by: ORTHOPAEDIC SURGERY

## 2018-11-26 PROCEDURE — 29806 SHO ARTHRS SRG CAPSULORRAPHY: CPT | Performed by: ORTHOPAEDIC SURGERY

## 2018-11-26 PROCEDURE — C9290 INJ, BUPIVACAINE LIPOSOME: HCPCS | Performed by: ANESTHESIOLOGY

## 2018-11-26 DEVICE — ANCHOR SUT 3 X 14.5MM W/ NUMBER 2 FIBERWIRE BCMPS SUTURETAK: Type: IMPLANTABLE DEVICE | Site: SHOULDER | Status: FUNCTIONAL

## 2018-11-26 DEVICE — ANCHOR SUT 3 X 14.5MM W/ 2-NUMBER 2 FIBERWIRE BCMPS SUTURETAK: Type: IMPLANTABLE DEVICE | Site: SHOULDER | Status: FUNCTIONAL

## 2018-11-26 RX ORDER — MIDAZOLAM HYDROCHLORIDE 1 MG/ML
INJECTION INTRAMUSCULAR; INTRAVENOUS AS NEEDED
Status: DISCONTINUED | OUTPATIENT
Start: 2018-11-26 | End: 2018-11-26 | Stop reason: SURG

## 2018-11-26 RX ORDER — ONDANSETRON 2 MG/ML
INJECTION INTRAMUSCULAR; INTRAVENOUS AS NEEDED
Status: DISCONTINUED | OUTPATIENT
Start: 2018-11-26 | End: 2018-11-26 | Stop reason: SURG

## 2018-11-26 RX ORDER — FENTANYL CITRATE 50 UG/ML
INJECTION, SOLUTION INTRAMUSCULAR; INTRAVENOUS AS NEEDED
Status: DISCONTINUED | OUTPATIENT
Start: 2018-11-26 | End: 2018-11-26 | Stop reason: SURG

## 2018-11-26 RX ORDER — LIDOCAINE HYDROCHLORIDE AND EPINEPHRINE 10; 10 MG/ML; UG/ML
INJECTION, SOLUTION INFILTRATION; PERINEURAL AS NEEDED
Status: DISCONTINUED | OUTPATIENT
Start: 2018-11-26 | End: 2018-11-26 | Stop reason: HOSPADM

## 2018-11-26 RX ORDER — ONDANSETRON 2 MG/ML
4 INJECTION INTRAMUSCULAR; INTRAVENOUS ONCE AS NEEDED
Status: DISCONTINUED | OUTPATIENT
Start: 2018-11-26 | End: 2018-11-26 | Stop reason: HOSPADM

## 2018-11-26 RX ORDER — ALBUTEROL SULFATE 2.5 MG/3ML
2.5 SOLUTION RESPIRATORY (INHALATION) EVERY 4 HOURS PRN
Status: DISCONTINUED | OUTPATIENT
Start: 2018-11-26 | End: 2018-11-26 | Stop reason: HOSPADM

## 2018-11-26 RX ORDER — PROPOFOL 10 MG/ML
INJECTION, EMULSION INTRAVENOUS AS NEEDED
Status: DISCONTINUED | OUTPATIENT
Start: 2018-11-26 | End: 2018-11-26 | Stop reason: SURG

## 2018-11-26 RX ORDER — CHLORHEXIDINE GLUCONATE 4 G/100ML
SOLUTION TOPICAL DAILY PRN
Status: DISCONTINUED | OUTPATIENT
Start: 2018-11-26 | End: 2018-11-26 | Stop reason: HOSPADM

## 2018-11-26 RX ORDER — NICOTINE 21 MG/24HR
21 PATCH, TRANSDERMAL 24 HOURS TRANSDERMAL DAILY
Status: DISCONTINUED | OUTPATIENT
Start: 2018-11-26 | End: 2018-11-26 | Stop reason: HOSPADM

## 2018-11-26 RX ORDER — FENTANYL CITRATE/PF 50 MCG/ML
25 SYRINGE (ML) INJECTION
Status: DISCONTINUED | OUTPATIENT
Start: 2018-11-26 | End: 2018-11-26 | Stop reason: HOSPADM

## 2018-11-26 RX ORDER — OXYCODONE HYDROCHLORIDE AND ACETAMINOPHEN 5; 325 MG/1; MG/1
1 TABLET ORAL EVERY 6 HOURS PRN
Qty: 30 TABLET | Refills: 0 | Status: SHIPPED | OUTPATIENT
Start: 2018-11-26 | End: 2019-04-15 | Stop reason: ALTCHOICE

## 2018-11-26 RX ORDER — SODIUM CHLORIDE, SODIUM LACTATE, POTASSIUM CHLORIDE, CALCIUM CHLORIDE 600; 310; 30; 20 MG/100ML; MG/100ML; MG/100ML; MG/100ML
125 INJECTION, SOLUTION INTRAVENOUS CONTINUOUS
Status: DISCONTINUED | OUTPATIENT
Start: 2018-11-26 | End: 2018-11-26

## 2018-11-26 RX ORDER — SUCCINYLCHOLINE CHLORIDE 20 MG/ML
INJECTION INTRAMUSCULAR; INTRAVENOUS AS NEEDED
Status: DISCONTINUED | OUTPATIENT
Start: 2018-11-26 | End: 2018-11-26 | Stop reason: SURG

## 2018-11-26 RX ORDER — BUPIVACAINE HYDROCHLORIDE 5 MG/ML
INJECTION, SOLUTION PERINEURAL AS NEEDED
Status: DISCONTINUED | OUTPATIENT
Start: 2018-11-26 | End: 2018-11-26 | Stop reason: SURG

## 2018-11-26 RX ORDER — SODIUM CHLORIDE, SODIUM LACTATE, POTASSIUM CHLORIDE, CALCIUM CHLORIDE 600; 310; 30; 20 MG/100ML; MG/100ML; MG/100ML; MG/100ML
125 INJECTION, SOLUTION INTRAVENOUS CONTINUOUS
Status: DISCONTINUED | OUTPATIENT
Start: 2018-11-26 | End: 2018-11-26 | Stop reason: HOSPADM

## 2018-11-26 RX ORDER — OXYCODONE HYDROCHLORIDE AND ACETAMINOPHEN 5; 325 MG/1; MG/1
2 TABLET ORAL EVERY 4 HOURS PRN
Status: DISCONTINUED | OUTPATIENT
Start: 2018-11-26 | End: 2018-11-26 | Stop reason: HOSPADM

## 2018-11-26 RX ORDER — MAGNESIUM HYDROXIDE 1200 MG/15ML
LIQUID ORAL AS NEEDED
Status: DISCONTINUED | OUTPATIENT
Start: 2018-11-26 | End: 2018-11-26 | Stop reason: HOSPADM

## 2018-11-26 RX ORDER — CLINDAMYCIN PHOSPHATE 900 MG/50ML
900 INJECTION INTRAVENOUS ONCE
Status: COMPLETED | OUTPATIENT
Start: 2018-11-26 | End: 2018-11-26

## 2018-11-26 RX ORDER — LIDOCAINE HYDROCHLORIDE 10 MG/ML
INJECTION, SOLUTION INFILTRATION; PERINEURAL AS NEEDED
Status: DISCONTINUED | OUTPATIENT
Start: 2018-11-26 | End: 2018-11-26 | Stop reason: SURG

## 2018-11-26 RX ADMIN — SUCCINYLCHOLINE CHLORIDE 160 MG: 20 INJECTION, SOLUTION INTRAMUSCULAR; INTRAVENOUS at 07:57

## 2018-11-26 RX ADMIN — SODIUM CHLORIDE, SODIUM LACTATE, POTASSIUM CHLORIDE, AND CALCIUM CHLORIDE: .6; .31; .03; .02 INJECTION, SOLUTION INTRAVENOUS at 07:02

## 2018-11-26 RX ADMIN — ONDANSETRON HYDROCHLORIDE 4 MG: 2 INJECTION, SOLUTION INTRAVENOUS at 10:25

## 2018-11-26 RX ADMIN — CLINDAMYCIN PHOSPHATE 900 MG: 18 INJECTION, SOLUTION INTRAMUSCULAR; INTRAVENOUS at 07:53

## 2018-11-26 RX ADMIN — PROPOFOL 30 MG: 10 INJECTION, EMULSION INTRAVENOUS at 10:19

## 2018-11-26 RX ADMIN — MIDAZOLAM HYDROCHLORIDE 1 MG: 1 INJECTION, SOLUTION INTRAMUSCULAR; INTRAVENOUS at 07:26

## 2018-11-26 RX ADMIN — FENTANYL CITRATE 50 MCG: 50 INJECTION, SOLUTION INTRAMUSCULAR; INTRAVENOUS at 07:27

## 2018-11-26 RX ADMIN — SODIUM CHLORIDE, SODIUM LACTATE, POTASSIUM CHLORIDE, AND CALCIUM CHLORIDE 125 ML/HR: .6; .31; .03; .02 INJECTION, SOLUTION INTRAVENOUS at 06:50

## 2018-11-26 RX ADMIN — BUPIVACAINE HYDROCHLORIDE 10 ML: 5 INJECTION, SOLUTION PERINEURAL at 07:41

## 2018-11-26 RX ADMIN — MIDAZOLAM HYDROCHLORIDE 1 MG: 1 INJECTION, SOLUTION INTRAMUSCULAR; INTRAVENOUS at 07:34

## 2018-11-26 RX ADMIN — PROPOFOL 200 MG: 10 INJECTION, EMULSION INTRAVENOUS at 07:57

## 2018-11-26 RX ADMIN — LIDOCAINE HYDROCHLORIDE 50 MG: 10 INJECTION, SOLUTION INFILTRATION; PERINEURAL at 07:57

## 2018-11-26 RX ADMIN — FENTANYL CITRATE 50 MCG: 50 INJECTION, SOLUTION INTRAMUSCULAR; INTRAVENOUS at 07:57

## 2018-11-26 RX ADMIN — DEXAMETHASONE SODIUM PHOSPHATE 4 MG: 10 INJECTION INTRAMUSCULAR; INTRAVENOUS at 08:05

## 2018-11-26 RX ADMIN — BUPIVACAINE 20 ML: 13.3 INJECTION, SUSPENSION, LIPOSOMAL INFILTRATION at 07:41

## 2018-11-26 NOTE — OP NOTE
OPERATIVE REPORT  PATIENT NAME: Kaitlin Mcgovern    :  1980  MRN: [id number]  Pt Location: [facility] OR ROOM 02    SURGERY DATE: 2018    Surgeon(s) and Role:     * Monroe Lloyd MD - Primary     * Candelario Conde PA-C - Assisting no qualified resident available    Preop Diagnosis:  Labral tear of shoulder, right, subsequent encounter [S43 431D]    Post-Op Diagnosis Codes:     * Labral tear of shoulder, right, subsequent encounter [S43 431D]    Procedure(s) (LRB):  Shoulder Arthroscopy with labral repair and biceps tenotomy (Right)    Specimen(s):  * No specimens in log *    Estimated Blood Loss:   25 mL    Drains:       Anesthesia Type:   General w/ Interscalene Block    Operative Indications:  Labral tear of shoulder, right, subsequent encounter [S43 431D]  Painful right shoulder not responding to nonoperative measures    Operative Findings:  Anterior and posterior slap tear  Grade 3-4 arthritis strip anteriorly glenoid measuring around 2 mm wide by 2 cm long  Grade 1-2 rest of glenoid  Intact humeral head  Biceps tendon which over 30% torn and frayed a tenotomy performed  Biceps anchor stable  Rotator cuff intact subscap intact supraspinatus intact infraspinatus intact  Anterior and posterior labral repair done with 3 anchors  Cuff intact on the bursal side    Complications:   None    Procedure and Technique: All treatment options were discussed with the patient including non-operative and operative treatment options  Patient has failed non-perative treatment and has opted for surgical intervention  Risks, complications and benefits of all treatment options were discussed in detail  The risks of surgical intervention including infection, injury to vessels and nerves  risk of failure to achieve desired results, risk of need for further procedures, potential risk of loss of life and limb were discussed with the patient  Informed consent was obtained from the patient  The operative site was marked and signed  A timeout was performed prior to the procedure  The patient was re-identified ,including name, date of birth, procedure, consent form reviewed, site and laterality  Appropriate antibiotics were administered preoperatively    The Physician Assistant was present for the entire case and provided essential assistance with patient positioning, prep and draping, wound closure, sterile dressing and splint application, all under my direct supervision(there was no resident available to assist with this case)      Implant Name Type Inv  Item Serial No   Lot No  LRB No  Used   ANCHOR SUT 3 X 14 5MM W/ 2-NUMBER 2 FIBERWIRE [employer]CM[employer] SUTURETAK - [id number]  ANCHOR SUT 3 X 14 5MM W/ 2-NUMBER 2 [employer]CM[employer]UTURETAK  [employer] [id number] Right 1   ANCHOR SUT 3 X 14 5MM W/ 2-NUMBER 2 [employer] SUTURETAK - [id number]  ANCHOR SUT 3 X 14 5MM W/ 2-NUMBER 2 [employer]  [employer] [id number] Right 1   ANCHOR SUT 3 X 14 5MM W/ NUMBER 2 [employer]CM[employer] SUTURETAK - [id number]   ANCHOR SUT 3 X 14 5MM W/ NUMBER 2 [employer] BCM[employer] [facility] [id number] Right 1           Patient was placed in the lateral decubitus position with all bony problems well padded  Right shoulder was prepared and draped in sterile fashion  15 lb of balanced traction was used  Standard posterior anterior portals were used      Arthroscopy revealed detached labrum anteriorly from the 5 to the 1:30 position  Strip of glenoid grade 4 arthritis anteriorly measuring 2 mm wide by 2 cm long  Rest of glenoid grade 1-2 changes  Biceps frayed at the root but ankle intact over 30% biceps frayed biceps tenotomy done it was initially anchored with a FiberWire suture which detached itself due to the poor quality of tissue there was some venous bleeding hands mattress sutures placed  Posterior labral detachment from the 8 to 10:00 position  Intact humeral head     After biceps tenotomy is performed synovectomies performed anterior labrum was elevated with an elevator the footprint was prepared with the shaver and the rasp  Two double loaded anchors were placed anteriorly 1 at the 4:30 position 1 at around 2:30 a m  Position  Four sutures were placed in 3 in mattress fashion 1 simple excellent repair of the anterior labrum was obtained    Attention was then turned towards posterior labrum was fixed with a single loaded anchor good repair was obtained  The humeral head was centralized of the procedure  Insert was then placed in subacromial bursa    The cuff was intact and so bursectomy was not performed was no inflammation in the bursa      All portals were closed with nylon anterior portal was closed with mattress fashion due to venous bleeding    Sterile dressings and a shoulder immobilizer applied   I was present for the entire procedure    Patient Disposition:  PACU     SIGNATURE: Jamison Krabbe, MD  DATE: November 26, 2018  TIME: 10:39 AM

## 2018-11-26 NOTE — H&P
H&P Exam - Jimmy Martinez 45 y o  male MRN: [id number]    Unit/Bed#: OR [place] Encounter: [id number]    Assessment:  Right shoulder pain due to slap tear    Plan: For right shoulder arthroscopy and slap repair  Informed consent obtained for the patient and his wife  Operative site marked and signed    History of Present Illness   Right shoulder pain for many months  Not getting better with nonoperative measures  Patient had an MRI which showed extensive slap tear    Patient presents for surgical treatment as the pain in the shoulders interfering with activities of daily living    Review of Systems    Historical Information   Past Medical History:   Diagnosis Date    Aortic valve cusp abnormality     "bicuspid aortic valve"    Hypertension      Past Surgical History:   Procedure Laterality Date    ANTERIOR CRUCIATE LIGAMENT REPAIR Right     KNEE ARTHROSCOPY Right     OTHER SURGICAL HISTORY      SAE    [place] SHLDR ARTHROSCOP,SURG,REPAIR,SLAP LESION Left 5/26/2016    Procedure: SHOULDER ARTHROSCOPY SLAP REPAIR;  Surgeon: Krishan Jiang MD;  Location[facility] OR;  Service: Orthopedics    ROTATOR CUFF REPAIR Left     VASECTOMY       Social History   History   Alcohol Use    Yes     Comment: rare     History   Drug Use No     History   Smoking Status    Never Smoker   Smokeless Tobacco    Current User     Family History: non-contributory    Meds/Allergies   all medications and allergies reviewed  Allergies   Allergen Reactions    Penicillins Hives       Objective   First Vitals:   Blood Pressure: 148/80 (11/26/18 0642)  Pulse: 65 (11/26/18 0642)  Temperature: 98 9 °F (37 2 °C) (11/26/18 0642)  Temp Source: Tympanic (11/26/18 0642)  Respirations: 18 (11/26/18 0642)  SpO2: 94 % (11/26/18 0642)    Current Vitals:   Blood Pressure: 148/80 (11/26/18 0642)  Pulse: 65 (11/26/18 0642)  Temperature: 98 9 °F (37 2 °C) (11/26/18 0642)  Temp Source: Tympanic (11/26/18 6350)  Respirations: 18 (11/26/18 0642)  SpO2: 94 % (11/26/18 8147)    No intake or output data in the 24 hours ending 11/26/18 0723    Invasive Devices     Peripheral Intravenous Line            Peripheral IV 11/26/18 Left Hand less than 1 day                Physical Exam     Patient awake alert oriented GCS 15  Cardiovascular system Respiratory system normal  Right shoulder examination  No obvious swelling or deformity  Mild joint line discomfort  Anterior apprehension positive  Cuff intact  Extremes of shoulder movement cause discomfort    Lab Results:  Reviewed  Imaging:  MRI right shoulder shows slap tear with intact cuff  EKG, Pathology, and Other Studies:  Reviewed    Code Status: No Order  Advance Directive and Living Will:      Power of :    POLST:      Counseling / Coordination of Care: Total floor / unit time spent today 15 minutes

## 2018-11-26 NOTE — ANESTHESIA PROCEDURE NOTES
Peripheral Block    Patient location during procedure: holding area  Start time: 11/26/2018 7:25 AM  Reason for block: at surgeon's request and post-op pain management  Staffing  Anesthesiologist: Terell Monson  Performed: anesthesiologist   Preanesthetic Checklist  Completed: patient identified, site marked, surgical consent, pre-op evaluation, timeout performed, IV checked, risks and benefits discussed and monitors and equipment checked  Peripheral Block  Patient position: Semi-sitting  Prep: ChloraPrep  Patient monitoring: heart rate, cardiac monitor, continuous pulse ox and frequent blood pressure checks  Block type: interscalene  Laterality: right  Injection technique: single-shot  Procedures: ultrasound guided and nerve stimulator  Ultrasound permanent image saved  Local infiltration: bupivacaine (With Exparel 1 3% 20 mls )  Infiltration strength: 0 5 %  Dose: 10 mL  Needle  Needle type: Stimuplex   Needle gauge: 22 G  Needle length: 5 cm  Needle localization: nerve stimulator and ultrasound guidance (Shoulder Twitch with 0 5mA)  Needle insertion depth: 2 cm  Assessment  Injection assessment: incremental injection, local visualized surrounding nerve on ultrasound, negative aspiration for CSF, negative aspiration for heme and no paresthesia on injection  Paresthesia pain: none  Heart rate change: no  Slow fractionated injection: yes  Post-procedure:  site cleaned  patient tolerated the procedure well with no immediate complications  Additional Notes  Pt  reports pins and needles in fingers ten minutes after block

## 2018-11-26 NOTE — DISCHARGE INSTRUCTIONS
Shoulder Arthroscopy   WHAT YOU SHOULD KNOW:   Shoulder arthroscopy is a surgery to examine or repair your damaged or diseased shoulder joint  AFTER YOU LEAVE:   Medicines:   · Pain medicine: You may be given a prescription medicine to decrease pain  Do not wait until the pain is severe before you take this medicine  · Take your medicine as directed  Call your healthcare provider if you think your medicine is not helping or if you have side effects  Tell him if you are allergic to any medicine  Keep a list of the medicines, vitamins, and herbs you take  Include the amounts, and when and why you take them  Bring the list or the pill bottles to follow-up visits  Carry your medicine list with you in case of an emergency  Follow up with your primary healthcare provider or orthopedic surgeon as directed: You will need to return to have your shoulder checked and stitches removed  Write down your questions so you remember to ask them during your visits  Wound care:   · Wash your hands before and after you care for your incision wound  This will help prevent an infection  · Carefully wash the wound as directed  Dry the area and put on new, clean bandages as directed  Change your bandages when they get wet or dirty  · If you have steri-strips (thin strips of tape) over the wound, do not pull them off  Let them fall off by themselves  · Keep the stitches clean and dry  Do not trim or shorten the ends of your stitches  If they are rubbing on your clothing, you can put a soft gauze bandage between the stitches and your clothes  Self-care:   · Use ice:  Ice helps decrease swelling and pain  Ice may also help prevent tissue damage  Use an ice pack, or put crushed ice in a plastic bag  Cover it with a towel and place it on your shoulder for 15 to 20 minutes every hour or as directed  · Use an arm sling or a brace as directed:   You may need to wear a sling or brace to keep your shoulder close to your body and keep it from moving  This may help your shoulder heal faster and be more comfortable  Wear your sling or brace all the time, even while you sleep, until you are told it is okay to remove it  You can remove your sling or brace to bathe  · Ask about bathing:  Do not let your affected shoulder get wet unless your primary healthcare provider says it is okay  Ask your primary healthcare provider when you can bathe or swim  · Limit activities:  Do not use your arm to lift, pull, or push  Ask when you can return to sports or physical activity  Exercises:   · Home exercises:  After your surgery, you may be asked to do light and easy exercises at first  Do not bend forward from the waist or stretch your arm across your chest in front  Do not stretch your arm behind your back  You may be able to do more as you get stronger and as the pain decreases  Follow exercise instructions from your primary healthcare provider or orthopedic surgeon  · Physical therapy:  A physical therapist can teach you safe exercises to help improve movement and strength, and to decrease pain  Contact your primary healthcare provider or orthopedic surgeon if:   · You have a fever  · You have pain and swelling in your shoulder even after you take your medicines  · Your skin is itchy, swollen, or has a rash  · You have questions or concerns about your condition or care  Seek care immediately or call 911 if:   · You have chest pain or shortness of breath  · You fall and injure your shoulder  · Blood soaks through your bandage  · Any part of your arm is numb, tingly, cool to the touch, blue, or pale  · Your incision wounds are swollen, red, or have pus coming from them  · Your stitches come apart    Talk to your doctor, nurse or pharmacist before following any medical regimen to see if it is safe and effective for you      Please call Dr Bozena Jurado office with any Problems  [address] [facility] [place]  [phone]

## 2018-11-26 NOTE — TELEPHONE ENCOUNTER
Caller: patient  #: [phone]  Dr Burns      Patient sees Satish Sandoval  Patient had surgery today 11/26 and is requesting a work note stating he will be out until his next office visit  Patient stated his wife will  when it's completed  Please advise, thanks

## 2018-11-26 NOTE — ANESTHESIA POSTPROCEDURE EVALUATION
Post-Op Assessment Note      CV Status:  Stable    Mental Status:  Awake (sleepy, but opens eyes to name, answers questions)    Hydration Status:  Euvolemic    PONV Controlled:  Controlled    Airway Patency:  Patent    Post Op Vitals Reviewed: Yes          Staff: Anesthesiologist, CRNA       Comments: VSS, reflexes intact, maintains own airway            /88 (11/26/18 1058)    Temp 98 3 °F (36 8 °C) (11/26/18 1058)    Pulse 92 (11/26/18 1058)   Resp 16 (11/26/18 1058)    SpO2 96 % (11/26/18 1058)

## 2018-12-07 ENCOUNTER — OFFICE VISIT (OUTPATIENT)
Dept: OBGYN CLINIC | Facility: CLINIC | Age: 38
End: 2018-12-07

## 2018-12-07 VITALS
BODY MASS INDEX: 39.4 KG/M2 | WEIGHT: 307 LBS | SYSTOLIC BLOOD PRESSURE: 150 MMHG | DIASTOLIC BLOOD PRESSURE: 93 MMHG | HEART RATE: 67 BPM | HEIGHT: 74 IN

## 2018-12-07 DIAGNOSIS — Z98.890 STATUS POST ARTHROSCOPY OF RIGHT SHOULDER: Primary | ICD-10-CM

## 2018-12-07 PROCEDURE — 99024 POSTOP FOLLOW-UP VISIT: CPT | Performed by: ORTHOPAEDIC SURGERY

## 2018-12-07 NOTE — PROGRESS NOTES
Chief Complaint  Status post right shoulder arthroscopy    History Of Presenting Illness  Michael Petty 1980 presents with right shoulder arthroscopy slap repair biceps tenotomy done on November 26th, 2018  Patient presents for his 1st postop visit  Pain has improved significant      Current Medications  Current Outpatient Prescriptions   Medication Sig Dispense Refill    lisinopril-hydrochlorothiazide (PRINZIDE,ZESTORETIC) 10-12 5 MG per tablet Take 1 tablet by mouth daily 30 tablet 5    oxyCODONE-acetaminophen (PERCOCET) 5-325 mg per tablet Take 1 tablet by mouth every 6 (six) hours as needed for moderate pain for up to 30 doses Max Daily Amount: 4 tablets 30 tablet 0     No current facility-administered medications for this visit          Current Problems    Active Problems:   Patient Active Problem List    Diagnosis Date Noted    Need for influenza vaccination 10/19/2018    Labral tear of shoulder, right, subsequent encounter 08/27/2018    Bicuspid aortic valve 11/22/2017    Essential hypertension 11/22/2017    SLAP tear of shoulder 05/26/2016    Hypercholesterolemia 01/02/2015         Review of Systems:    General: negative for - chills, fatigue, fever,  weight gain or weight loss  Psychological: negative for - anxiety, behavioral disorder, concentration difficulties      Past Medical History:   Past Medical History:   Diagnosis Date    Aortic valve cusp abnormality     "bicuspid aortic valve"    Hypertension        Past Surgical History:   Past Surgical History:   Procedure Laterality Date    ANTERIOR CRUCIATE LIGAMENT REPAIR Right     KNEE ARTHROSCOPY Right     OTHER SURGICAL HISTORY      SAE    MA MARYR ARTHROSCOP,SURG,REPAIR,SLAP LESION Left 5/26/2016    Procedure: SHOULDER ARTHROSCOPY SLAP REPAIR;  Surgeon: Tereso Gold MD;  Location: [facility];  Service: Orthopedics    MA SHLDR ARTHROSCOP,SURG,REPAIR,SLAP LESION Right 11/26/2018    Procedure: Shoulder Arthroscopy with labral repair and biceps tenotomy;  Surgeon: Bandar Le MD;  Location: [facility];  Service: Orthopedics    ROTATOR CUFF REPAIR Left     VASECTOMY         Family History:  Family history reviewed and non-contributory  Family History   Problem Relation Age of Onset    Hypertension Father     Lung cancer Maternal Grandfather     Other Paternal Grandmother         Carotid Stenosis     Hyperlipidemia Paternal Grandmother     Hypertension Paternal Grandmother     Stroke Paternal Grandmother     Coronary artery disease Paternal Grandfather     Hyperlipidemia Paternal Grandfather     Hypertension Paternal Grandfather     Diabetes type II Paternal Grandfather        Social History:  Social History     Social History    Marital status:      Spouse name: N/A    Number of children: N/A    Years of education: N/A     Social History Main Topics    Smoking status: Never Smoker    Smokeless tobacco: Current User    Alcohol use Yes      Comment: rare    Drug use: No    Sexual activity: Yes     Other Topics Concern    None     Social History Narrative    Always uses seat belt     Caffeine Use     Dental care regularly             Allergies:    Allergies   Allergen Reactions    Penicillins Hives           Physical ExaminationBP 150/93   Pulse 67   Ht 6' 1 5" (1 867 m)   Wt (!) 139 kg (307 lb)   BMI 39 95 kg/m²   Gen: Alert and oriented to person, place, time  HEENT: EOMI, eyes clear, moist mucus membranes, hearing intact      Orthopedic Exam  Right shoulder all incisions healed sutures removed Steri-Strips applied  No distal neurovascular deficits          Impression  Stable status post right shoulder arthroscopy            Plan    Discussed operative findings and procedure performed on the patient  Patient will start postop physical therapy for biceps tenotomy and slap repair  Patient works as a  and he is not ready to go back to work for another 6-12 weeks    Bandar Le MD        Portions of the record may have been created with voice recognition software   Occasional wrong word or "sound a like" substitutions may have occurred due to the inherent limitations of voice recognition software   Read the chart carefully and recognize, using context, where substitutions have occurred

## 2018-12-07 NOTE — LETTER
December 7, 2018     Patient: Bryce Wu   YOB: 1980   Date of Visit: 12/7/2018       To Whom It May Concern: It is my medical opinion that Bryce Wu should remain out of work until Next office visit  If you have any questions or concerns, please don't hesitate to call           Sincerely,        Poncho Alva MD    CC: Bryce Wu

## 2018-12-11 ENCOUNTER — EVALUATION (OUTPATIENT)
Dept: PHYSICAL THERAPY | Facility: CLINIC | Age: 38
End: 2018-12-11
Payer: COMMERCIAL

## 2018-12-11 DIAGNOSIS — Z98.890 STATUS POST ARTHROSCOPY OF RIGHT SHOULDER: Primary | ICD-10-CM

## 2018-12-11 PROCEDURE — G8985 CARRY GOAL STATUS: HCPCS | Performed by: PHYSICAL THERAPIST

## 2018-12-11 PROCEDURE — 97140 MANUAL THERAPY 1/> REGIONS: CPT | Performed by: PHYSICAL THERAPIST

## 2018-12-11 PROCEDURE — 97010 HOT OR COLD PACKS THERAPY: CPT | Performed by: PHYSICAL THERAPIST

## 2018-12-11 PROCEDURE — 97110 THERAPEUTIC EXERCISES: CPT | Performed by: PHYSICAL THERAPIST

## 2018-12-11 PROCEDURE — 97161 PT EVAL LOW COMPLEX 20 MIN: CPT | Performed by: PHYSICAL THERAPIST

## 2018-12-11 PROCEDURE — 97535 SELF CARE MNGMENT TRAINING: CPT | Performed by: PHYSICAL THERAPIST

## 2018-12-11 PROCEDURE — G8984 CARRY CURRENT STATUS: HCPCS | Performed by: PHYSICAL THERAPIST

## 2018-12-11 NOTE — PROGRESS NOTES
PT Evaluation     Today's date: 2018  Patient name: Fabiano Aguirre  : 1980  MRN: [id number]  Referring provider: Raquel Lucero MD  Dx:   Encounter Diagnosis     ICD-10-CM    1  Status post arthroscopy of right shoulder Z98 890 Ambulatory referral to Physical Therapy                  Assessment  Understanding of Dx/Px/POC: good   Prognosis: good    Goals  STGs: To be complete within 4-6 weeks  - Decrease pain to < 2/10 at worst  - Increase AROM to WNL  - Increase strength to > 4+/5  - Improve postural awareness capacity to > 60min before deficit    LTGs: To be complete within 8 weeks  - Able to repetitively complete all overhead activity without pain or limitation for increased safety and functional capacity with ADLs and work-related duty  - Able to repetitively complete all reaching activity without pain or limitation for increased safety and functional capacity with ADLs and work-related duty  - Able to repetitively complete all pushing/pulling activity without pain or limitation for increased safety and functional capacity with ADLs and work-related duty  - Able to complete all lifting/carrying activity without pain or limitation for increased safety and functional capacity with ADLs and work-related duty    Plan  Frequency: 3x week  Duration in weeks: 4         Pt is a 45 y o  male with R shoulder pain who presents with functional deficits including decreased capacity with overhead activity, pushing/pulling, lifting/carrying, and behind the back/cross body reaching activity  Upon completion of today's initial evaluation, Juan Diego's sx remain consistent with being s/p R Shoulder SLAP repair 18  Patient will benefit from skilled physical therapy to address current deficits  Subjective Evaluation    Pain  Current pain ratin  At best pain ratin  At worst pain ratin  Location: R Shoulder         Pt reports he is s/p R Shoulder SLAP repair 18      Objective Pain level ranges 2-8/10  AROM: Flexion 40 degrees, Abd 30 degrees, IR 30 degrees, ER 20 degrees  Strength: Flexion 1+/5, Abd 1+/5, ER 1+/5, IR 2/5  Postural Awareness: Poor (rounded shoulders, forward head)  Unable to complete overhead activity without pain and limitation  Unable to complete pushing/pulling activity without pain and limitation  Unable to complete lifting/carrying activity without pain and limitation  Unable to complete cross body/behind the back reaching activity without pain and limitation    Flowsheet Rows      Most Recent Value   PT/OT G-Codes   FOTO information reviewed  -- [PSFS 2/10]   Assessment Type  Evaluation   G code set  Carrying, Moving & Handling Objects   Carrying, Moving and Handling Objects Current Status ()  CM   Carrying, Moving and Handling Objects Goal Status ()  CH          Precautions: R Shoulder SLAP repair protocol    Daily Treatment Diary    HEP: Sheet provided and discussed    Manual  12/11/18            ART x15'            IASTM             JM             PROM and UE stretch             STM/Triggerpoint               Exercise Diary  12/11/18            Pendulums x3'            Pullies             Wand ER/Flx 2x10 ea            Table Slides Scapular plain 2x10            Front Wall Slides             Lat Wall Slides             T-Band: Row             TB LTP             TB No Moneys             Prone Scaption             Prone Abd, Scap             Prone scap retract with ER             SL Er/ ABD             T-Band ER             AROM Scaption             UBE: Retro             Scap Retraction 3x10            Scap Depression 3x10            TB ER             TB 45 degree Sh  Abd                                                    Body Blade                 Modalities  12/11/18            MHP             CP x12'            Stim

## 2018-12-14 ENCOUNTER — OFFICE VISIT (OUTPATIENT)
Dept: PHYSICAL THERAPY | Facility: CLINIC | Age: 38
End: 2018-12-14
Payer: COMMERCIAL

## 2018-12-14 DIAGNOSIS — Z98.890 STATUS POST ARTHROSCOPY OF RIGHT SHOULDER: Primary | ICD-10-CM

## 2018-12-14 PROCEDURE — 97112 NEUROMUSCULAR REEDUCATION: CPT | Performed by: PHYSICAL THERAPIST

## 2018-12-14 PROCEDURE — 97140 MANUAL THERAPY 1/> REGIONS: CPT | Performed by: PHYSICAL THERAPIST

## 2018-12-14 PROCEDURE — 97010 HOT OR COLD PACKS THERAPY: CPT | Performed by: PHYSICAL THERAPIST

## 2018-12-14 PROCEDURE — 97110 THERAPEUTIC EXERCISES: CPT | Performed by: PHYSICAL THERAPIST

## 2018-12-14 NOTE — PROGRESS NOTES
Daily Note     Today's date: 2018  Patient name: Trudi Alex  : 1980  MRN: [id number]  Referring provider: Zofia Davis MD  Dx:   Encounter Diagnosis     ICD-10-CM    1  Status post arthroscopy of right shoulder Z98 890                   Subjective: Pt reports HEP going well  Overall feels ok  No setbacks  Objective: See treatment diary below      Assessment: Tolerated treatment well  Patient demonstrated fatigue post treatment, exhibited good technique with therapeutic exercises and would benefit from continued PT      Plan: Continue per plan of care  Progress treatment as tolerated        Precautions: R Shoulder SLAP repair protocol    Daily Treatment Diary    HEP: Sheet provided and discussed    Manual  18            ART x15'            IASTM             JM             PROM and UE stretch             STM/Triggerpoint               Exercise Diary  18            Pendulums x3'            Pullies 3x10            Wand ER/Flx 2x10 ea            Table Slides Scapular plain 2x10            Front Wall Slides             Lat Wall Slides             T-Band: Row             TB LTP             TB No Moneys             Prone Scaption             Prone Abd, Scap             Prone scap retract with ER             SL Er/ ABD             T-Band ER             AROM Scaption             UBE: Retro             Scap Retraction 3x10            Scap Depression 3x10            TB ER             TB 45 degree Sh  Abd                                                    Body Blade                 Modalities  18            MHP             CP x12'            Stim

## 2018-12-17 ENCOUNTER — TELEPHONE (OUTPATIENT)
Dept: OBGYN CLINIC | Facility: CLINIC | Age: 38
End: 2018-12-17

## 2018-12-17 NOTE — TELEPHONE ENCOUNTER
Dr Dean So is calling to follow up on the status of his [facility] paperwork  He has left messages with Jesse Anglin however has not heard back  The papers were dropped off over two weeks ago at your office  Please call him @ [phone]    Thank you

## 2018-12-17 NOTE — TELEPHONE ENCOUNTER
CHECKED THE STATUS IN THE LOG, THEY ARE NOT DONE, EMAILED Vernell Whitley S/W HUGO ON THE PHONE, SHE WILL CALL HIM WHEN THEY ARE COMPLETED

## 2018-12-17 NOTE — TELEPHONE ENCOUNTER
Papers were received on 12/5/18 patient was instructed 10-14 business days  , it's only been 8 business days  I will email Dim[facility] and find out the status

## 2018-12-18 ENCOUNTER — OFFICE VISIT (OUTPATIENT)
Dept: PHYSICAL THERAPY | Facility: CLINIC | Age: 38
End: 2018-12-18
Payer: COMMERCIAL

## 2018-12-18 DIAGNOSIS — Z98.890 STATUS POST ARTHROSCOPY OF RIGHT SHOULDER: Primary | ICD-10-CM

## 2018-12-18 PROCEDURE — 97140 MANUAL THERAPY 1/> REGIONS: CPT

## 2018-12-18 PROCEDURE — 97110 THERAPEUTIC EXERCISES: CPT

## 2018-12-18 NOTE — PROGRESS NOTES
Daily Note     Today's date: 2018  Patient name: Kaitlin Mcgovern  : 1980  MRN: [id number]  Referring provider: Jam Ortez MD  Dx:   Encounter Diagnosis     ICD-10-CM    1  Status post arthroscopy of right shoulder Z98 890                   Subjective: Reports soreness into R bicep    Objective: See treatment diary below      Assessment: Tolerated treatment well  Patient demonstrated fatigue post treatment and exhibited good technique with therapeutic exercises  Pt able to complete added standing wall slides AAROM completing with notable fatigue  Pt simona remainder of program well with min c/o pain  Plan: Continue per plan of care         Precautions: R Shoulder SLAP repair protocol     Daily Treatment Diary     HEP: Sheet provided and discussed     Manual  18                   ART x15'                     IASTM                       JM                       PROM and UE stretch                       STM/Triggerpoint    15'                      Exercise Diary  18                   Pendulums x3'  5'                   Pullies 3x10  3/10                   Wand ER/Flx 2x10 ea  3/10                   Table Slides Scapular plain 2x10  3/10                   Front Wall Slides   2/10                   Lat Wall Slides                       T-Band: Row                       TB LTP                       TB No Moneys                       Prone Scaption                       Prone Abd, Scap                       Prone scap retract with ER                       SL Er/ ABD                       T-Band ER                       AROM Scaption                       UBE: Retro                       Scap Retraction 3x10  3/10                   Scap Depression 3x10  3/10                   TB ER                       TB 45 degree Sh  Abd                                                                                               Body Blade                             Modalities 12/14/18 12/18/18                   GRACIELA                       CP x12'  10'                   Stim

## 2018-12-20 ENCOUNTER — OFFICE VISIT (OUTPATIENT)
Dept: PHYSICAL THERAPY | Facility: CLINIC | Age: 38
End: 2018-12-20
Payer: COMMERCIAL

## 2018-12-20 DIAGNOSIS — Z98.890 STATUS POST ARTHROSCOPY OF RIGHT SHOULDER: Primary | ICD-10-CM

## 2018-12-20 PROCEDURE — 97110 THERAPEUTIC EXERCISES: CPT | Performed by: PHYSICAL THERAPIST

## 2018-12-20 PROCEDURE — 97140 MANUAL THERAPY 1/> REGIONS: CPT | Performed by: PHYSICAL THERAPIST

## 2018-12-20 PROCEDURE — 97010 HOT OR COLD PACKS THERAPY: CPT | Performed by: PHYSICAL THERAPIST

## 2018-12-20 PROCEDURE — 97112 NEUROMUSCULAR REEDUCATION: CPT | Performed by: PHYSICAL THERAPIST

## 2018-12-20 NOTE — PROGRESS NOTES
Daily Note     Today's date: 2018  Patient name: Hossein Key  : 1980  MRN: [id number]  Referring provider: Kofi Rainey MD  Dx:   Encounter Diagnosis     ICD-10-CM    1  Status post arthroscopy of right shoulder Z98 890                   Subjective: Pt reports generalized appropriate R Shoulder soreness  HEP going well  Continue to see progress daily  Anxious to continue making progress  Objective: See treatment diary below      Assessment: Tolerated treatment well  Patient demonstrated fatigue post treatment and exhibited good technique with therapeutic exercises  Pt simona remainder of program well with min c/o pain  Plan: Continue per plan of care  Progress treatment as tolerated          Precautions: R Shoulder SLAP repair protocol     Daily Treatment Diary     HEP: Sheet provided and discussed     Manual  18                   ART x15'  x20'                   IASTM                       JM                       PROM and UE stretch                       STM/Triggerpoint                          Exercise Diary  18                   Pendulums x3'  5'                   Pullies 3x10  3/10                   Wand ER/Flx 2x10 ea  3/10                   Table Slides Scapular plain 2x10  3/10                   Front Wall Slides   3/10                   Lat Wall Slides                       T-Band: Row                       TB LTP                       TB No Moneys                       Prone Scaption                       Prone Abd, Scap                       Prone scap retract with ER                       SL Er/ ABD                       T-Band ER                       AROM Scaption                       UBE: Retro                       Scap Retraction 3x10  3/10                   Scap Depression 3x10  3/10                   TB ER                       TB 45 degree Sh  Abd                        Isometrics Abd, ER    3x10                    Table PROM ER    2x10                                           Body Blade                             Modalities  12/14/18 12/20/18                   MHP    x10'                   CP x12'  10'                   Stim

## 2018-12-24 ENCOUNTER — OFFICE VISIT (OUTPATIENT)
Dept: PHYSICAL THERAPY | Facility: CLINIC | Age: 38
End: 2018-12-24
Payer: COMMERCIAL

## 2018-12-24 DIAGNOSIS — Z98.890 STATUS POST ARTHROSCOPY OF RIGHT SHOULDER: Primary | ICD-10-CM

## 2018-12-24 DIAGNOSIS — S43.431D LABRAL TEAR OF SHOULDER, RIGHT, SUBSEQUENT ENCOUNTER: ICD-10-CM

## 2018-12-24 PROCEDURE — 97110 THERAPEUTIC EXERCISES: CPT

## 2018-12-24 PROCEDURE — 97140 MANUAL THERAPY 1/> REGIONS: CPT

## 2018-12-24 PROCEDURE — 97010 HOT OR COLD PACKS THERAPY: CPT

## 2018-12-24 NOTE — PROGRESS NOTES
Daily Note     Today's date: 2018  Patient name: Jazz Jenkins  : 1980  MRN: [id number]  Referring provider: Jace Mon MD  Dx:   Encounter Diagnosis     ICD-10-CM    1  Status post arthroscopy of right shoulder Z98 890                   Subjective: Pt reports he has some intermittent pain at front of shoulder  Objective: See treatment diary below      Assessment: Tolerated treatment well  Patient demonstrated fatigue post treatment and exhibited good technique with therapeutic exercises  Pt with notable tenderness along biceps tendon  Pt cont to simona program as per protocol  Pt able to complete with min increased pain  Plan: Continue per plan of care           Precautions: R Shoulder SLAP repair protocol     Daily Treatment Diary     HEP: Sheet provided and discussed     Manual  18                   ART x15'  x20'                   IASTM                       JM                       PROM and UE stretch                       STM/Triggerpoint      15'                    Exercise Diary  18                 Pendulums x3'  5'  5'                 Pullies 3x10  3/10  3/10                 Wand ER/Flx 2x10 ea  3/10  3/10                 Table Slides Scapular plain 2x10  3/10  3/10                 Front Wall Slides   3/10  3/10                 Lat Wall Slides                       T-Band: Row                       TB LTP                       TB No Moneys                       Prone Scaption                       Prone Abd, Scap                       Prone scap retract with ER                       SL Er/ ABD                       T-Band ER                       AROM Scaption                       UBE: Retro                       Scap Retraction 3x10  3/10  3/10                 Scap Depression 3x10  3/10  3/10                 TB ER                       TB 45 degree Sh  Abd                        Isometrics Abd, ER    3x10  3/10                Table PROM ER    2x10  3/10                                         Body Blade                             Modalities  12/14/18 12/20/18 12/24/18                 MHP    x10'                   CP x12'  10'  10'                 Stim

## 2018-12-27 ENCOUNTER — OFFICE VISIT (OUTPATIENT)
Dept: PHYSICAL THERAPY | Facility: CLINIC | Age: 38
End: 2018-12-27
Payer: COMMERCIAL

## 2018-12-27 DIAGNOSIS — Z98.890 STATUS POST ARTHROSCOPY OF RIGHT SHOULDER: Primary | ICD-10-CM

## 2018-12-27 DIAGNOSIS — S43.431D LABRAL TEAR OF SHOULDER, RIGHT, SUBSEQUENT ENCOUNTER: ICD-10-CM

## 2018-12-27 PROCEDURE — 97110 THERAPEUTIC EXERCISES: CPT

## 2018-12-27 PROCEDURE — 97140 MANUAL THERAPY 1/> REGIONS: CPT

## 2018-12-27 PROCEDURE — 97010 HOT OR COLD PACKS THERAPY: CPT

## 2018-12-27 NOTE — PROGRESS NOTES
Daily Note     Today's date: 2018  Patient name: Trudi Alex  : 1980  MRN: [id number]  Referring provider: Zofia Davis MD  Dx:   Encounter Diagnosis     ICD-10-CM    1  Status post arthroscopy of right shoulder Z98 890    2  Labral tear of shoulder, right, subsequent encounter S43 236D                   Subjective: Reports some mild soreness  Objective: See treatment diary below      Assessment: Tolerated treatment well  Patient exhibited good technique with therapeutic exercises  Pt cont to fair well with current exercise program as per protocol  Pt simona with mild soreness         Plan: Continue per plan of care          Precautions: R Shoulder SLAP repair protocol     Daily Treatment Diary     HEP: Sheet provided and discussed     Manual  18               ART x15'  x20'                   IASTM                       JM                       PROM and UE stretch                       STM/Triggerpoint      15'  15'                  Exercise Diary  18               Pendulums x3'  5'  5'  5'               Pullies 3x10  3/10  3/10  3/10               Wand ER/Flx 2x10 ea  3/10  3/10  3/10               Table Slides Scapular plain 2x10  3/10  3/10  3/10               Front Wall Slides   3/10  3/10  3/10               Lat Wall Slides                       T-Band: Row                       TB LTP                       TB No Moneys                       Prone Scaption                       Prone Abd, Scap                       Prone scap retract with ER                       SL Er/ ABD                       T-Band ER                       AROM Scaption                       UBE: Retro                       Scap Retraction 3x10  3/10  3/10  3/10               Scap Depression 3x10  3/10  3/10  3/10               TB ER                       TB 45 degree Sh  Abd                        Isometrics Abd, ER    3x10  3/10  3/10                Table PROM ER    2x10  3/10  3/10                                       Body Blade                             Modalities  12/14/18 12/20/18 12/24/18 12/27/18               MHP    x10'                   CP x12'  10'  10'  10'               Stim

## 2018-12-31 ENCOUNTER — OFFICE VISIT (OUTPATIENT)
Dept: PHYSICAL THERAPY | Facility: CLINIC | Age: 38
End: 2018-12-31
Payer: COMMERCIAL

## 2018-12-31 DIAGNOSIS — S43.431D LABRAL TEAR OF SHOULDER, RIGHT, SUBSEQUENT ENCOUNTER: ICD-10-CM

## 2018-12-31 DIAGNOSIS — S43.431D SUPERIOR GLENOID LABRUM LESION OF RIGHT SHOULDER, SUBSEQUENT ENCOUNTER: ICD-10-CM

## 2018-12-31 DIAGNOSIS — Z98.890 STATUS POST ARTHROSCOPY OF RIGHT SHOULDER: Primary | ICD-10-CM

## 2018-12-31 PROCEDURE — 97010 HOT OR COLD PACKS THERAPY: CPT

## 2018-12-31 PROCEDURE — 97140 MANUAL THERAPY 1/> REGIONS: CPT

## 2018-12-31 PROCEDURE — 97110 THERAPEUTIC EXERCISES: CPT

## 2018-12-31 NOTE — PROGRESS NOTES
Daily Note     Today's date: 2018  Patient name: Jimmy Martinez  : 1980  MRN: [id number]  Referring provider: Master Musa MD  Dx:   Encounter Diagnosis     ICD-10-CM    1  Status post arthroscopy of right shoulder Z98 890    2  Labral tear of shoulder, right, subsequent encounter S43 669D                   Subjective: Pt reports he is doing well with some soreness along R bicep  Objective: See treatment diary below      Assessment: Tolerated treatment well  Patient demonstrated fatigue post treatment and exhibited good technique with therapeutic exercises  Pt able to complete program with min c/o pain today  Pt able to complete program as per protocol  Plan: Continue per plan of care         Precautions: R Shoulder SLAP repair protocol     Daily Treatment Diary     HEP: Sheet provided and discussed     Manual  18    18             ART x15'  x20'                   IASTM                       JM                       PROM and UE stretch                       STM/Triggerpoint      15'  15'  15'                Exercise Diary  18            Pendulums x3'  5'  5'  5'  5'           Pullies 3x10  3/10  3/10  3/10  3/10           Wand ER/Flx 2x10 ea  3/10  3/10  3/10  3/10           Table Slides Scapular plain 2x10  3/10  3/10  3/10  3/10           Front Wall Slides   3/10  3/10  3/10  3/10           Lat Wall Slides                     T-Band: Row                     TB LTP                     TB No Moneys                     Prone Scaption                     Prone Abd, Scap                     Prone scap retract with ER                     SL Er/ ABD                     T-Band ER                     AROM Scaption                     UBE: Retro                     Scap Retraction 3x10  3/10  3/10  3/10  3/10           Scap Depression 3x10  3/10  3/10  3/10  3/10           TB ER                     TB 45 degree Sh  Abd                      Isometrics Abd, ER    3x10  3/10  3/10  3/10            Table PROM ER    2x10  3/10  3/10  3/10                                 Body Blade                           Modalities  12/14/18 12/20/18 12/24/18 12/27/18 12/31/18             MHP    x10'                   CP x12'  10'  10'  10'  10'             Stim

## 2019-01-03 ENCOUNTER — OFFICE VISIT (OUTPATIENT)
Dept: PHYSICAL THERAPY | Facility: CLINIC | Age: 39
End: 2019-01-03
Payer: COMMERCIAL

## 2019-01-03 DIAGNOSIS — Z98.890 STATUS POST ARTHROSCOPY OF RIGHT SHOULDER: Primary | ICD-10-CM

## 2019-01-03 DIAGNOSIS — S43.431D SUPERIOR GLENOID LABRUM LESION OF RIGHT SHOULDER, SUBSEQUENT ENCOUNTER: ICD-10-CM

## 2019-01-03 DIAGNOSIS — S43.431D LABRAL TEAR OF SHOULDER, RIGHT, SUBSEQUENT ENCOUNTER: ICD-10-CM

## 2019-01-03 PROCEDURE — 97110 THERAPEUTIC EXERCISES: CPT

## 2019-01-03 PROCEDURE — 97140 MANUAL THERAPY 1/> REGIONS: CPT

## 2019-01-03 NOTE — PROGRESS NOTES
Daily Note     Today's date: 1/3/2019  Patient name: Michael Petty  : 1980  MRN: [id number]  Referring provider: Jacquelyn Conteh MD  Dx:   Encounter Diagnosis     ICD-10-CM    1  Status post arthroscopy of right shoulder Z98 890    2  Labral tear of shoulder, right, subsequent encounter S43 431D    3  Superior glenoid labrum lesion of right shoulder, subsequent encounter S43 431D                   Subjective: Pt reports soreness along R bicep  Objective: See treatment diary below      Assessment: Tolerated treatment fair  Patient demonstrated fatigue post treatment and would benefit from continued PT  Pt with cont notable discomfort along R bicep and biceps tendon  Pt simona program additions with some discomfort and weakness  Plan: Continue per plan of care        Precautions: R Shoulder SLAP repair protocol     Daily Treatment Diary     HEP: Sheet provided and discussed     Manual  12/14/18  12/20/18  2418  12/27/18  12/31/18 1/3/19          ART x15'  x20'                 IASTM                     JM                     PROM and UE stretch                     STM/Triggerpoint      15'  15'  15'  15'            Exercise Diary  12/14/18  12/20/18  12/24/18  12/27/18 12/31/18   1/3/19       Pendulums x3'  5'  5'  5'  5'  5'       Pullies 3x10  3/10  3/10  3/10  3/10  3/10       Wand ER/Flx 2x10 ea  3/10  3/10  3/10  3/10  3/10       Table Slides Scapular plain 2x10  3/10  3/10  3/10  3/10  3/10       Front Wall Slides   3/10  3/10  3/10  3/10  3/10       Lat Wall Slides                   T-Band: Row                   TB LTP                   TB No Moneys            3/10 no resistance       Prone Scaption                   Prone Abd, Scap                   Prone scap retract with ER                   SL Er/ ABD                   T-Band ER                   AROM Scaption            3/10       UBE: Retro                   Scap Retraction 3x10  3/10  3/10  3/10  3/10  3/10       Scap Depression 3x10  3/10  3/10  3/10  3/10  3/10       TB ER                   TB 45 degree Sh  Abd            L2 2/10        Isometrics Abd, ER    3x10  3/10  3/10  3/10  3/10        Table PROM ER    2x10  3/10  3/10  3/10  3/10       Stool slide back with AAROM           3/10       Body Blade                         Modalities  12/14/18  12/20/18  12/24/18  12/27/18  12/31/18  1/3/19           MHP    x10'                   CP x12'  10'  10'  10'  10'  10'           Stim

## 2019-01-08 ENCOUNTER — OFFICE VISIT (OUTPATIENT)
Dept: PHYSICAL THERAPY | Facility: CLINIC | Age: 39
End: 2019-01-08
Payer: COMMERCIAL

## 2019-01-08 DIAGNOSIS — Z98.890 STATUS POST ARTHROSCOPY OF RIGHT SHOULDER: Primary | ICD-10-CM

## 2019-01-08 DIAGNOSIS — S43.431D SUPERIOR GLENOID LABRUM LESION OF RIGHT SHOULDER, SUBSEQUENT ENCOUNTER: ICD-10-CM

## 2019-01-08 DIAGNOSIS — S43.431D LABRAL TEAR OF SHOULDER, RIGHT, SUBSEQUENT ENCOUNTER: ICD-10-CM

## 2019-01-08 PROCEDURE — 97140 MANUAL THERAPY 1/> REGIONS: CPT

## 2019-01-08 PROCEDURE — 97110 THERAPEUTIC EXERCISES: CPT

## 2019-01-08 NOTE — PROGRESS NOTES
Daily Note     Today's date: 2019  Patient name: Ankita Bear  : 1980  MRN: [id number]  Referring provider: Sandra Garrison MD  Dx:   Encounter Diagnosis     ICD-10-CM    1  Status post arthroscopy of right shoulder Z98 890    2  Labral tear of shoulder, right, subsequent encounter S43 431D    3  Superior glenoid labrum lesion of right shoulder, subsequent encounter S43 431D                   Subjective: Pt reports cont soreness in shoulder  Reports no sharp increased pain or soreness after last visit  Objective: See treatment diary below      Assessment: Tolerated treatment well  Patient demonstrated fatigue post treatment and exhibited good technique with therapeutic exercises  Pt with notable fatigue with exercises  Pt simona with min increased pain however cont shoulder mm soreness  Pt simona added shoulder CKC scapular strab addition with ball wall circles with fatigue  Plan: Continue per plan of care       Precautions: R Shoulder SLAP repair protocol     Daily Treatment Diary     HEP: Sheet provided and discussed     Manual  12/14/18  12/20/18  2418  12/27/18  12/31/18 1/3/19          ART x15'  x20'                 IASTM                     JM                     PROM and UE stretch                     STM/Triggerpoint      15'  15'  15'  15'            Exercise Diary  12/14/18  12/20/18  12/24/18  12/27/18 12/31/18   1/3/19  1/8/19     Pendulums x3'  5'  5'  5'  5'  5'  5'     Pullies 3x10  3/10  3/10  3/10  3/10  3/10  3/10     Wand ER/Flx 2x10 ea  3/10  3/10  3/10  3/10  3/10  3/10     Table Slides Scapular plain 2x10  3/10  3/10  3/10  3/10  3/10  3/10     Front Wall Slides   3/10  3/10  3/10  3/10  3/10  3/10     Ball wall circles             3/10     T-Band: Row                   TB LTP                   TB No Moneys            3/10 no resistance  3/10     Prone Scaption                   Prone Abd, Scap                   Prone scap retract with ER                   SL Er/ ABD                   T-Band ER                   AROM Scaption            3/10  3/10     UBE: Retro                   Scap Retraction 3x10  3/10  3/10  3/10  3/10  3/10  3/10     Scap Depression 3x10  3/10  3/10  3/10  3/10  3/10  3/10     TB ER                   TB 45 degree Sh  Abd            L2 2/10  L2 3/10      Isometrics Abd, ER    3x10  3/10  3/10  3/10  3/10  3/10      Table PROM ER    2x10  3/10  3/10  3/10  3/10  3/10     Stool slide back with AAROM           3/10  3/10     Body Blade                         Modalities  12/14/18  12/20/18  12/24/18  12/27/18  12/31/18  1/3/19  1/8/19       MHP    x10'                 CP x12'  10'  10'  10'  10'  10'  10'       Stim

## 2019-01-09 ENCOUNTER — CLINICAL SUPPORT (OUTPATIENT)
Dept: INTERNAL MEDICINE CLINIC | Facility: CLINIC | Age: 39
End: 2019-01-09
Payer: COMMERCIAL

## 2019-01-09 ENCOUNTER — TELEPHONE (OUTPATIENT)
Dept: INTERNAL MEDICINE CLINIC | Facility: CLINIC | Age: 39
End: 2019-01-09

## 2019-01-09 DIAGNOSIS — I10 ESSENTIAL HYPERTENSION: ICD-10-CM

## 2019-01-09 DIAGNOSIS — Z11.1 ENCOUNTER FOR TUBERCULIN SKIN TEST: Primary | ICD-10-CM

## 2019-01-09 PROCEDURE — 86580 TB INTRADERMAL TEST: CPT

## 2019-01-09 RX ORDER — LISINOPRIL AND HYDROCHLOROTHIAZIDE 12.5; 1 MG/1; MG/1
1 TABLET ORAL DAILY
Qty: 30 TABLET | Refills: 5 | Status: SHIPPED | OUTPATIENT
Start: 2019-01-09 | End: 2019-09-12 | Stop reason: SDUPTHER

## 2019-01-10 ENCOUNTER — OFFICE VISIT (OUTPATIENT)
Dept: PHYSICAL THERAPY | Facility: CLINIC | Age: 39
End: 2019-01-10
Payer: COMMERCIAL

## 2019-01-10 DIAGNOSIS — Z98.890 STATUS POST ARTHROSCOPY OF RIGHT SHOULDER: Primary | ICD-10-CM

## 2019-01-10 DIAGNOSIS — S43.431D SUPERIOR GLENOID LABRUM LESION OF RIGHT SHOULDER, SUBSEQUENT ENCOUNTER: ICD-10-CM

## 2019-01-10 DIAGNOSIS — S43.431D LABRAL TEAR OF SHOULDER, RIGHT, SUBSEQUENT ENCOUNTER: ICD-10-CM

## 2019-01-10 PROCEDURE — G8985 CARRY GOAL STATUS: HCPCS | Performed by: PHYSICAL THERAPIST

## 2019-01-10 PROCEDURE — 97110 THERAPEUTIC EXERCISES: CPT | Performed by: PHYSICAL THERAPIST

## 2019-01-10 PROCEDURE — 97112 NEUROMUSCULAR REEDUCATION: CPT | Performed by: PHYSICAL THERAPIST

## 2019-01-10 PROCEDURE — 97140 MANUAL THERAPY 1/> REGIONS: CPT | Performed by: PHYSICAL THERAPIST

## 2019-01-10 PROCEDURE — 97530 THERAPEUTIC ACTIVITIES: CPT | Performed by: PHYSICAL THERAPIST

## 2019-01-10 PROCEDURE — 97010 HOT OR COLD PACKS THERAPY: CPT | Performed by: PHYSICAL THERAPIST

## 2019-01-10 PROCEDURE — G8984 CARRY CURRENT STATUS: HCPCS | Performed by: PHYSICAL THERAPIST

## 2019-01-10 NOTE — PROGRESS NOTES
PT Evaluation     Today's date: 1/10/2019  Patient name: Jazz Jenkins  : 1980  MRN: [id number]  Referring provider: Jace Mon MD  Dx:   Encounter Diagnosis     ICD-10-CM    1  Status post arthroscopy of right shoulder Z98 890    2  Labral tear of shoulder, right, subsequent encounter S43 431D    3  Superior glenoid labrum lesion of right shoulder, subsequent encounter S43 431D                   Assessment  Understanding of Dx/Px/POC: good   Prognosis: good    Goals  STGs: To be complete within 4-6 weeks  - Decrease pain to < 2/10 at worst (partially met)  - Increase AROM to WNL (partially met)  - Increase strength to > 4+/5 (partially met)  - Improve postural awareness capacity to > 60min before deficit (partially met)    LTGs: To be complete within 8 weeks  - Able to repetitively complete all overhead activity without pain or limitation for increased safety and functional capacity with ADLs and work-related duty (partially met)  - Able to repetitively complete all reaching activity without pain or limitation for increased safety and functional capacity with ADLs and work-related duty (partially met)  - Able to repetitively complete all pushing/pulling activity without pain or limitation for increased safety and functional capacity with ADLs and work-related duty (partially met)  - Able to complete all lifting/carrying activity without pain or limitation for increased safety and functional capacity with ADLs and work-related duty (partially met)    Plan  Frequency: 3x week  Duration in weeks: 4         Upon completion of today's re-evaluation, as evidenced by present objective and subjective measures, Juan Diego's sx remain consistent with continued, well-paced progress, as per protocol from being s/p R Shoulder SLAP repair 18  Patient will benefit from continued skilled physical therapy to address current deficits      Subjective Evaluation    Pain  Current pain ratin  At best pain rating: 0  At worst pain ratin  Location: R Shoulder         Pt reports he feels like he is improving steadily, as per protocol  CC at this time is tightness  Reports he will intermittently get pain increases, but short lasting and due to improper mechanics with activity  Pt reports he is anxious to continue making progress      Objective Pain level ranges 0-6/10  AROM: Flexion 170 degrees, Abd 170 degrees, IR 45 degrees, ER 65 degrees  Strength: Flexion 2+/5, Abd 2+/5, ER 2/5, IR 2+/5  Postural Awareness: Fair and improving (rounded shoulders, forward head)  Unable to complete overhead activity without pain and limitation, but improving  Unable to complete pushing/pulling activity without pain and limitation, but improving  Unable to complete lifting/carrying activity without pain and limitation, but improving  Unable to complete cross body/behind the back reaching activity without pain and limitation, but improving        Precautions: R Shoulder SLAP repair protocol     Daily Treatment Diary     HEP: Sheet provided and discussed     Manual  12/14/18  12/20/18  2418  12/27/18  12/31/18 1/10/19          ART x15'  x20'        x20'         IASTM                     JM                     PROM and UE stretch                     STM/Triggerpoint      15'  15'  15'              Exercise Diary  12/14/18  12/20/18  12/24/18  12/27/18 12/31/18   1/3/19  1/10/19     Pendulums x3'  5'  5'  5'  5'  5'  5'     Pullies 3x10  3/10  3/10  3/10  3/10  3/10  3/10     Wand ER/Flx 2x10 ea  3/10  3/10  3/10  3/10  3/10  3/10     Table Slides Scapular plain 2x10  3/10  3/10  3/10  3/10  3/10  3/10     Front Wall Slides   3/10  3/10  3/10  3/10  3/10  3/10     Ball wall circles             3/10     T-Band: Row                   TB LTP                   TB No Moneys            3/10 no resistance  3/10     Prone Scaption                   Prone Abd, Scap                   Prone scap retract with ER                   SL Er/ ABD                   T-Band ER                   AROM Scaption            3/10  3/10     UBE: Retro                   Scap Retraction 3x10  3/10  3/10  3/10  3/10  3/10  3/10     Scap Depression 3x10  3/10  3/10  3/10  3/10  3/10  3/10     TB ER                   TB 45 degree Sh  Abd            L2 2/10  L2 3/10      Isometrics Abd, ER    3x10  3/10  3/10  3/10  3/10  3/10      Table PROM ER    2x10  3/10  3/10  3/10  3/10  3/10     Stool slide back with AAROM           3/10  3/10     Body Blade                         Modalities  12/14/18  12/20/18  12/24/18  12/27/18  12/31/18  1/3/19  1/10/19       MHP    x10'                 CP x12'  10'  10'  10'  10'  10'  10'       Stim

## 2019-01-11 ENCOUNTER — CLINICAL SUPPORT (OUTPATIENT)
Dept: INTERNAL MEDICINE CLINIC | Facility: CLINIC | Age: 39
End: 2019-01-11

## 2019-01-11 DIAGNOSIS — Z11.1 ENCOUNTER FOR PPD SKIN TEST READING: Primary | ICD-10-CM

## 2019-01-11 LAB
INDURATION: 0 MM
TB SKIN TEST: NEGATIVE

## 2019-01-15 ENCOUNTER — OFFICE VISIT (OUTPATIENT)
Dept: PHYSICAL THERAPY | Facility: CLINIC | Age: 39
End: 2019-01-15
Payer: COMMERCIAL

## 2019-01-15 DIAGNOSIS — Z98.890 STATUS POST ARTHROSCOPY OF RIGHT SHOULDER: Primary | ICD-10-CM

## 2019-01-15 DIAGNOSIS — S43.431D LABRAL TEAR OF SHOULDER, RIGHT, SUBSEQUENT ENCOUNTER: ICD-10-CM

## 2019-01-15 DIAGNOSIS — S43.431D SUPERIOR GLENOID LABRUM LESION OF RIGHT SHOULDER, SUBSEQUENT ENCOUNTER: ICD-10-CM

## 2019-01-15 PROCEDURE — 97110 THERAPEUTIC EXERCISES: CPT

## 2019-01-15 PROCEDURE — 97010 HOT OR COLD PACKS THERAPY: CPT

## 2019-01-15 PROCEDURE — 97140 MANUAL THERAPY 1/> REGIONS: CPT

## 2019-01-15 NOTE — PROGRESS NOTES
Daily Note     Today's date: 1/15/2019  Patient name: Melanie Butt  : 1980  MRN: [id number]  Referring provider: Bindu Marinelli MD  Dx:   Encounter Diagnosis     ICD-10-CM    1  Status post arthroscopy of right shoulder Z98 890    2  Labral tear of shoulder, right, subsequent encounter S43 431D    3  Superior glenoid labrum lesion of right shoulder, subsequent encounter S43 431D                   Subjective: Pt reports he is doing well with mild soreness in shoulder and slow steady progress  Objective: See treatment diary below      Assessment: Tolerated treatment well  Patient demonstrated fatigue post treatment  Pt with notable improvement in AROM and simona to exercises today  Pt simona added weight with AROM exercises well with min discomfort  Plan: Continue per plan of care         Precautions: R Shoulder SLAP repair protocol     Daily Treatment Diary     HEP: Sheet provided and discussed     Manual  12/14/18  12/20/18  2418  12/27/18  12/31/18 1/10/19   1/15/19       ART x15'  x20'        x20'         IASTM                     JM                     PROM and UE stretch                     STM/Triggerpoint      15'  15'  15'    15'          Exercise Diary  12/14/18  12/20/18  12/24/18  12/27/18 12/31/18   1/3/19  1/10/19  1/15/19   Pendulums x3'  5'  5'  5'  5'  5'  5'  5'   Pullies 3x10  3/10  3/10  3/10  3/10  3/10  3/10  3/10   Wand ER/Flx 2x10 ea  3/10  3/10  3/10  3/10  3/10  3/10  3/10   Table Slides Scapular plain 2x10  3/10  3/10  3/10  3/10  3/10  3/10  3/10   Front Wall Slides   3/10  3/10  3/10  3/10  3/10  3/10  3/10   Ball wall circles             3/10  3/10   T-Band: Row                   TB LTP                   TB No Moneys            3/10 no resistance  3/10  3/10 L3   Prone Scaption                   Prone Abd, Scap                   Prone scap retract with ER                   SL Er/ ABD                   T-Band ER                   AROM Scaption            3/10  3/10  3/10 1#   UBE: Retro                   Scap Retraction 3x10  3/10  3/10  3/10  3/10  3/10  3/10  3/10   Scap Depression 3x10  3/10  3/10  3/10  3/10  3/10  3/10  3/10   TB ER                   TB 45 degree Sh  Abd            L2 2/10  L2 3/10  L3 3/10    Isometrics Abd, ER    3x10  3/10  3/10  3/10  3/10  3/10  3/10    Table PROM ER    2x10  3/10  3/10  3/10  3/10  3/10  3/10   Stool slide back with AAROM           3/10  3/10  3/10   Body Blade                         Modalities  12/14/18  12/20/18  12/24/18  12/27/18  12/31/18  1/3/19  1/10/19  1/15/19     MHP    x10'                 CP x12'  10'  10'  10'  10'  10'  10'  10'     Stim

## 2019-01-17 ENCOUNTER — OFFICE VISIT (OUTPATIENT)
Dept: PHYSICAL THERAPY | Facility: CLINIC | Age: 39
End: 2019-01-17
Payer: COMMERCIAL

## 2019-01-17 DIAGNOSIS — S43.431D SUPERIOR GLENOID LABRUM LESION OF RIGHT SHOULDER, SUBSEQUENT ENCOUNTER: ICD-10-CM

## 2019-01-17 DIAGNOSIS — Z98.890 STATUS POST ARTHROSCOPY OF RIGHT SHOULDER: Primary | ICD-10-CM

## 2019-01-17 DIAGNOSIS — S43.431D LABRAL TEAR OF SHOULDER, RIGHT, SUBSEQUENT ENCOUNTER: ICD-10-CM

## 2019-01-17 PROCEDURE — 97140 MANUAL THERAPY 1/> REGIONS: CPT

## 2019-01-17 PROCEDURE — 97110 THERAPEUTIC EXERCISES: CPT

## 2019-01-17 NOTE — PROGRESS NOTES
Daily Note     Today's date: 2019  Patient name: Jimmy Martinez  : 1980  MRN: [id number]  Referring provider: Master Musa MD  Dx:   Encounter Diagnosis     ICD-10-CM    1  Status post arthroscopy of right shoulder Z98 890    2  Labral tear of shoulder, right, subsequent encounter S43 431D    3  Superior glenoid labrum lesion of right shoulder, subsequent encounter S43 431D                   Subjective: Pt reports overall improvement  States cont soreness  Objective: See treatment diary below      Assessment: Tolerated treatment well  Patient demonstrated fatigue post treatment and exhibited good technique with therapeutic exercises  Pt able to simona increased weight to 2# with AROM scaption  Pt cont to demonstrate slow but steady progress toward goals  Plan: Continue per plan of care       Precautions: R Shoulder SLAP repair protocol     Daily Treatment Diary     HEP: Sheet provided and discussed     Manual  1/17/19  12/20/18  2418  12/27/18  12/31/18 1/10/19   1/15/19       ART x15'  x20'        x20'         IASTM                     JM                     PROM and UE stretch  15'                   STM/Triggerpoint      15'  15'  15'    15'          Exercise Diary  1/17/19  12/20/18  12/24/18  12/27/18 12/31/18   1/3/19  1/10/19  1/15/19   Pendulums   5'  5'  5'  5'  5'  5'  5'   Pullies 3x10  3/10  3/10  3/10  3/10  3/10  3/10  3/10   Wand ER/Flx 3x10 ea  3/10  3/10  3/10  3/10  3/10  3/10  3/10   Table Slides Scapular plain 3x10  3/10  3/10  3/10  3/10  3/10  3/10  3/10   Front Wall Slides  3/10 3/10  3/10  3/10  3/10  3/10  3/10  3/10   Ball wall circles  3/10 ea            3/10  3/10   T-Band: Row                   TB LTP                   TB No Moneys  L3 3/10          3/10 no resistance  3/10  3/10 L3   Prone Scaption                   Prone Abd, Scap                   Prone scap retract with ER                   SL Er/ ABD                   T-Band ER                   AROM Scaption  2# 3/10          3/10  3/10  3/10 1#   UBE: Retro  5'                 Scap Retraction 3x10  3/10  3/10  3/10  3/10  3/10  3/10  3/10   Scap Depression 3x10  3/10  3/10  3/10  3/10  3/10  3/10  3/10   TB ER                   TB 45 degree Sh  Abd  L3 3/10          L2 2/10  L2 3/10  L3 3/10    Isometrics Abd, ER  3/10  3x10  3/10  3/10  3/10  3/10  3/10  3/10    Table PROM ER  3/10  2x10  3/10  3/10  3/10  3/10  3/10  3/10   Stool slide back with AAROM           3/10  3/10  3/10   Body Blade                         Modalities  1/17/19  12/20/18  12/24/18  12/27/18  12/31/18  1/3/19  1/10/19  1/15/19   MHP    x10'               CP time  10'  10'  10'  10'  10'  10'  10'   Stim

## 2019-01-18 ENCOUNTER — OFFICE VISIT (OUTPATIENT)
Dept: OBGYN CLINIC | Facility: CLINIC | Age: 39
End: 2019-01-18

## 2019-01-18 VITALS
BODY MASS INDEX: 39.66 KG/M2 | DIASTOLIC BLOOD PRESSURE: 89 MMHG | SYSTOLIC BLOOD PRESSURE: 145 MMHG | HEART RATE: 60 BPM | HEIGHT: 74 IN | WEIGHT: 309 LBS

## 2019-01-18 DIAGNOSIS — Z98.890 STATUS POST ARTHROSCOPY OF RIGHT SHOULDER: Primary | ICD-10-CM

## 2019-01-18 PROCEDURE — 99024 POSTOP FOLLOW-UP VISIT: CPT | Performed by: ORTHOPAEDIC SURGERY

## 2019-01-18 NOTE — PROGRESS NOTES
45 y o male presents for 7 weeks postoperative visit status post right arthroscopic  Slap repair with biceps tenotomy on 11/26/2018  Patient notes that she still has some discomfort with reaching across his body and reaching behind his back which notes tightness  He notes physical therapy is going well  He is pleased with his outcome thus far  Review of Systems  Review of systems negative unless otherwise specified in HPI    Past Medical History  Past Medical History:   Diagnosis Date    Aortic valve cusp abnormality     "bicuspid aortic valve"    Hypertension      Past Surgical History  Past Surgical History:   Procedure Laterality Date    ANTERIOR CRUCIATE LIGAMENT REPAIR Right     KNEE ARTHROSCOPY Right     OTHER SURGICAL HISTORY      SAE    NH SHLDR ARTHROSCOP,SURG,REPAIR,SLAP LESION Left 5/26/2016    Procedure: SHOULDER ARTHROSCOPY SLAP REPAIR;  Surgeon: Alex Mcleod MD;  Location: [facility];  Service: Orthopedics    NH SHLDR ARTHROSCOP,SURG,REPAIR,SLAP LESION Right 11/26/2018    Procedure: Shoulder Arthroscopy with labral repair and biceps tenotomy;  Surgeon: Alex Mcleod MD;  Location: [facility];  Service: Orthopedics    ROTATOR CUFF REPAIR Left     VASECTOMY       Current Medications  Current Outpatient Prescriptions on File Prior to Visit   Medication Sig Dispense Refill    lisinopril-hydrochlorothiazide (PRINZIDE,ZESTORETIC) 10-12 5 MG per tablet Take 1 tablet by mouth daily 30 tablet 5    oxyCODONE-acetaminophen (PERCOCET) 5-325 mg per tablet Take 1 tablet by mouth every 6 (six) hours as needed for moderate pain for up to 30 doses Max Daily Amount: 4 tablets (Patient not taking: Reported on 1/18/2019 ) 30 tablet 0     No current facility-administered medications on file prior to visit          Recent Labs [facility])    0  Lab Value Date/Time   HCT 43 6 10/31/2018 0748   HGB 14 8 10/31/2018 0748   WBC 8 70 10/31/2018 0748   INR 1 01 10/31/2018 0748   GLUCOSE 98 11/28/2016 1950     Physical exam  · General: Awake, Alert, Oriented, mildly overweight  · Eyes: Pupils equal, round and reactive to light  · Heart: regular rate and rhythm  · Lungs: No audible wheezing  · Abdomen: soft  right Shoulder  ·  portal sites well healed  He has forward flexion to approximately 130, abduction full, internal rotation behind back to L5  He is grossly neurovascular intact  No pain with resisted supination  Minimally positive speed's test     Imaging   none    Procedure   right shoulder slap repair and biceps tenotomy 11/26/2018    Assessment:  Patient doing well status post right shoulder surgery as above but unable return to work and requires more physical therapy  Plan:   Patient will continue in physical therapy  Keep out of work until next visit  See the patient back in approximately 6 weeks  Likely return to work early March 2019  Ice as needed

## 2019-01-18 NOTE — LETTER
January 18, 2019     Patient: Emil Hameed   YOB: 1980   Date of Visit: 1/18/2019       To Whom It May Concern: It is my medical opinion that Emil Hameed should remain out of work until  next visit in 6 weeks  If you have any questions or concerns, please don't hesitate to call           Sincerely,         Shawna Tamez PA-C  CC: No Recipients

## 2019-01-18 NOTE — PATIENT INSTRUCTIONS
Patient will continue doing physical therapy and follow the protocol  He is avoiding any heavy lifting  See patient back in 6 weeks for likely final visit discussed return to work in early March with the   [employer]

## 2019-01-22 ENCOUNTER — OFFICE VISIT (OUTPATIENT)
Dept: PHYSICAL THERAPY | Facility: CLINIC | Age: 39
End: 2019-01-22
Payer: COMMERCIAL

## 2019-01-22 DIAGNOSIS — S43.431D SUPERIOR GLENOID LABRUM LESION OF RIGHT SHOULDER, SUBSEQUENT ENCOUNTER: ICD-10-CM

## 2019-01-22 DIAGNOSIS — S43.431D LABRAL TEAR OF SHOULDER, RIGHT, SUBSEQUENT ENCOUNTER: ICD-10-CM

## 2019-01-22 DIAGNOSIS — Z98.890 STATUS POST ARTHROSCOPY OF RIGHT SHOULDER: Primary | ICD-10-CM

## 2019-01-22 PROCEDURE — 97110 THERAPEUTIC EXERCISES: CPT

## 2019-01-22 PROCEDURE — 97140 MANUAL THERAPY 1/> REGIONS: CPT

## 2019-01-22 PROCEDURE — 97010 HOT OR COLD PACKS THERAPY: CPT

## 2019-01-24 ENCOUNTER — OFFICE VISIT (OUTPATIENT)
Dept: PHYSICAL THERAPY | Facility: CLINIC | Age: 39
End: 2019-01-24
Payer: COMMERCIAL

## 2019-01-24 DIAGNOSIS — S43.431D LABRAL TEAR OF SHOULDER, RIGHT, SUBSEQUENT ENCOUNTER: ICD-10-CM

## 2019-01-24 DIAGNOSIS — S43.431D SUPERIOR GLENOID LABRUM LESION OF RIGHT SHOULDER, SUBSEQUENT ENCOUNTER: ICD-10-CM

## 2019-01-24 DIAGNOSIS — Z98.890 STATUS POST ARTHROSCOPY OF RIGHT SHOULDER: Primary | ICD-10-CM

## 2019-01-24 PROCEDURE — 97140 MANUAL THERAPY 1/> REGIONS: CPT

## 2019-01-24 PROCEDURE — 97010 HOT OR COLD PACKS THERAPY: CPT

## 2019-01-24 PROCEDURE — 97110 THERAPEUTIC EXERCISES: CPT

## 2019-01-24 NOTE — PROGRESS NOTES
Daily Note     Today's date: 2019  Patient name: Emil Hameed  : 1980  MRN: [id number]  Referring provider: Ernst Saul MD  Dx:   Encounter Diagnosis     ICD-10-CM    1  Status post arthroscopy of right shoulder Z98 [id number]    2  Labral tear of shoulder, right, subsequent encounter S43 431D    3  Superior glenoid labrum lesion of right shoulder, subsequent encounter S43 431D                   Subjective: Pt reports he will cont PT for next month and return to [facility] for f/u at end of Feb        Objective: See treatment diary below      Assessment: Tolerated treatment well  Patient demonstrated fatigue post treatment and exhibited good technique with therapeutic exercises  Pt able to complete added weight to prone scap strength program with notable fatigue  Pt with slight increased discomfort in R shoulder with PROM flx/abd  Plan: Continue per plan of care            Precautions: R Shoulder SLAP repair protocol     Daily Treatment Diary     HEP: Sheet provided and discussed     Manual  1/17/19  1/22/19  1/24/19  12/27/18  12/31/18 1/10/19   1/15/19       ART x15'          x20'         IASTM                     JM                     PROM and UE stretch  15'  15'  15'               STM/Triggerpoint      15'  15'  15'    15'          Exercise Diary  1/17/19  1/22/19  1/24/19  12/27/18 12/31/18   1/3/19  1/10/19  1/15/19   Pendulums       5'  5'  5'  5'  5'   Pullies 3x10  3/10  3/10  3/10  3/10  3/10  3/10  3/10   Wand ER/Flx 3x10 ea  3/10  3/10  3/10  3/10  3/10  3/10  3/10   Table Slides Scapular plain 3x10  3/10  3/10  3/10  3/10  3/10  3/10  3/10   Front Wall Slides  3/10 3/10  3/10  3/10  3/10  3/10  3/10  3/10   Ball wall circles  3/10 ea  3/10 ea   3/10 ea        3/10  3/10   T-Band: Row      L4 3/10             TB LTP      L4 3/10             TB No Moneys  L3 3/10 L3 3/10  L3 3/10      3/10 no resistance  3/10  3/10 L3   Prone Scaption                   Prone Abd, Scap    0# 3/10  1# 3/10             Prone scap retract with ER                   SL Er/ ABD                   T-Band ER                   AROM Scaption  2# 3/10  2# 3/10  2# 3/10      3/10  3/10  3/10 1#   UBE: Retro  5'  5'  5'             Scap Retraction 3x10  3/10  3/10  3/10  3/10  3/10  3/10  3/10   Scap Depression 3x10  3/10  3/10  3/10  3/10  3/10  3/10  3/10   TB ER                   TB 45 degree Sh  Abd  L3 3/10  L4 3/10  L4 3/10      L2 2/10  L2 3/10  L3 3/10    Isometrics Abd, ER  3/10  3x10  3/10  3/10  3/10  3/10  3/10  3/10    Table PROM ER  3/10  3x10  3/10  3/10  3/10  3/10  3/10  3/10   Stool slide back with AAROM           3/10  3/10  3/10   Body Blade                         Modalities  1/17/19  1/22/19  1/24/19  12/27/18  12/31/18  1/3/19  1/10/19  1/15/19   MHP                   CP time  10'  10'  10'  10'  10'  10'  10'   Stim

## 2019-01-29 ENCOUNTER — OFFICE VISIT (OUTPATIENT)
Dept: PHYSICAL THERAPY | Facility: CLINIC | Age: 39
End: 2019-01-29
Payer: COMMERCIAL

## 2019-01-29 DIAGNOSIS — Z98.890 STATUS POST ARTHROSCOPY OF RIGHT SHOULDER: Primary | ICD-10-CM

## 2019-01-29 DIAGNOSIS — S43.431D LABRAL TEAR OF SHOULDER, RIGHT, SUBSEQUENT ENCOUNTER: ICD-10-CM

## 2019-01-29 DIAGNOSIS — S43.431D SUPERIOR GLENOID LABRUM LESION OF RIGHT SHOULDER, SUBSEQUENT ENCOUNTER: ICD-10-CM

## 2019-01-29 PROCEDURE — 97140 MANUAL THERAPY 1/> REGIONS: CPT | Performed by: PHYSICAL THERAPIST

## 2019-01-29 PROCEDURE — 97112 NEUROMUSCULAR REEDUCATION: CPT | Performed by: PHYSICAL THERAPIST

## 2019-01-29 PROCEDURE — 97530 THERAPEUTIC ACTIVITIES: CPT | Performed by: PHYSICAL THERAPIST

## 2019-01-29 PROCEDURE — 97010 HOT OR COLD PACKS THERAPY: CPT | Performed by: PHYSICAL THERAPIST

## 2019-01-29 PROCEDURE — 97110 THERAPEUTIC EXERCISES: CPT | Performed by: PHYSICAL THERAPIST

## 2019-01-29 NOTE — PROGRESS NOTES
Daily Note     Today's date: 2019  Patient name: Yoly Moyer  : 1980  MRN: [id number]  Referring provider: Jose Alberto Dennis MD  Dx:   Encounter Diagnosis     ICD-10-CM    1  Status post arthroscopy of right shoulder Z98 890    2  Labral tear of shoulder, right, subsequent encounter S43 431D    3  Superior glenoid labrum lesion of right shoulder, subsequent encounter S43 431D                   Subjective: Pt reports he will cont PT for next month and return to MD for f/u at end of Feb        Objective: See treatment diary below      Assessment: Tolerated treatment well  Patient demonstrated fatigue post treatment, exhibited good technique with therapeutic exercises and would benefit from continued PT  Pt able to complete added weight to prone scap strength program with notable fatigue  Pt with slight increased discomfort in R shoulder with PROM flx/abd  Plan: Continue per plan of care  Progress treatment as tolerated             Precautions: R Shoulder SLAP repair protocol     Daily Treatment Diary     HEP: Sheet provided and discussed     Manual  1/17/19  1/22/19  1/29/19  12/27/18  12/31/18 1/10/19   1/15/19       ART x15'          x20'         IASTM                     JM                     PROM and UE stretch  15'  15'  15'               STM/Triggerpoint      15'  15'  15'    15'          Exercise Diary  1/17/19  1/22/19  1/29/19  12/27/18 12/31/18   1/3/19  1/10/19  1/15/19   Pendulums       5'  5'  5'  5'  5'   Pullies 3x10  3/10  3/10  3/10  3/10  3/10  3/10  3/10   Wand ER/Flx 3x10 ea  3/10  3/10  3/10  3/10  3/10  3/10  3/10   Table Slides Scapular plain 3x10  3/10  3/10  3/10  3/10  3/10  3/10  3/10   Front Wall Slides  3/10 3/10  3/10  3/10  3/10  3/10  3/10  3/10   Ball wall circles  3/10 ea  3/10 ea   3/10 ea        3/10  3/10   T-Band: Row      L4 3/10             TB LTP      L4 3/10             TB No Moneys  L3 3/10 L3 3/10  L3 3/10      3/10 no resistance  3/10  3/10 L3 Prone Scap retract      3x10             Wall [place]   2x10        Wall [place]   2x10                   Prone Abd, Scap    0# 3/10  1# 3/10             Prone scap retract with ER                   SL Er/ ABD                   T-Band ER                   AROM Scaption  2# 3/10  2# 3/10  2# 3/10      3/10  3/10  3/10 1#   UBE: Retro  5'  5'  5'             Scap Retraction 3x10  3/10  3/10  3/10  3/10  3/10  3/10  3/10   Scap Depression 3x10  3/10  3/10  3/10  3/10  3/10  3/10  3/10   TB ER                   TB 45 degree Sh  Abd  L3 3/10  L4 3/10  L4 3/10      L2 2/10  L2 3/10  L3 3/10    Isometrics Abd, ER  3/10  3x10  3/10  3/10  3/10  3/10  3/10  3/10    Table PROM ER  3/10  3x10  3/10  3/10  3/10  3/10  3/10  3/10   Stool slide back with AAROM           3/10  3/10  3/10   Body Blade                         Modalities  1/17/19  1/22/19  1/29/19  12/27/18  12/31/18  1/3/19  1/10/19  1/15/19   MHP                   CP time  10'  10'  10'  10'  10'  10'  10'   Stim

## 2019-01-31 ENCOUNTER — OFFICE VISIT (OUTPATIENT)
Dept: PHYSICAL THERAPY | Facility: CLINIC | Age: 39
End: 2019-01-31
Payer: COMMERCIAL

## 2019-01-31 DIAGNOSIS — Z98.890 STATUS POST ARTHROSCOPY OF RIGHT SHOULDER: Primary | ICD-10-CM

## 2019-01-31 DIAGNOSIS — S43.431D LABRAL TEAR OF SHOULDER, RIGHT, SUBSEQUENT ENCOUNTER: ICD-10-CM

## 2019-01-31 DIAGNOSIS — S43.431D SUPERIOR GLENOID LABRUM LESION OF RIGHT SHOULDER, SUBSEQUENT ENCOUNTER: ICD-10-CM

## 2019-01-31 PROCEDURE — 97110 THERAPEUTIC EXERCISES: CPT

## 2019-01-31 PROCEDURE — 97140 MANUAL THERAPY 1/> REGIONS: CPT

## 2019-01-31 NOTE — PROGRESS NOTES
Daily Note     Today's date: 2019  Patient name: Melanie Butt  : 1980  MRN: [id number]  Referring provider: Bindu Marinelli MD  Dx:   Encounter Diagnosis     ICD-10-CM    1  Status post arthroscopy of right shoulder Z98 890    2  Labral tear of shoulder, right, subsequent encounter S43 431D    3  Superior glenoid labrum lesion of right shoulder, subsequent encounter S43 431D                   Subjective: Pt states he is challenged with added exercises  Objective: See treatment diary below      Assessment: Tolerated treatment fair  Patient demonstrated fatigue post treatment  Pt will cont to benefit from tx to cont to progress Pt with ER AROM and strength  Pt is challenged with recent additions of AROM strength program       Plan: Continue per plan of care         Precautions: R Shoulder SLAP repair protocol     Daily Treatment Diary     HEP: Sheet provided and discussed     Manual  1/17/19  1/22/19  1/29/19  1/31/19  12/31/18 1/10/19   1/15/19       ART x15'          x20'         IASTM                     JM                     PROM and UE stretch  15'  15'  15'               STM/Triggerpoint      15'  15'  15'    15'          Exercise Diary  1/17/19  1/22/19  1/29/19  1/31/19 12/31/18   1/3/19  1/10/19  1/15/19   Pendulums          5'  5'  5'  5'   Pullies 3x10  3/10  3/10  3/10  3/10  3/10  3/10  3/10   Wand ER/Flx 3x10 ea  3/10  3/10  3/10  3/10  3/10  3/10  3/10   Table Slides Scapular plain 3x10  3/10  3/10  3/10  3/10  3/10  3/10  3/10   Front Wall Slides  3/10 3/10  3/10  3/10  3/10  3/10  3/10  3/10   Ball wall circles  3/10 ea  3/10 ea   3/10 ea        3/10  3/10   T-Band: Row      L4 3/10  L4 3/10           TB LTP      L4 3/10  L4 3/10           TB No Moneys  L3 3/10 L3 3/10  L3 3/10  L3 3/10    3/10 no resistance  3/10  3/10 L3   Prone Scap retract      3x10  3/10           Wall [place]     2x10  2/10           Wall [place]     2x10  2/10            Supine no moneys, wall angels, field goals       3/10 ea            Prone Abd, Scap    0# 3/10  1# 3/10  2# 3/10           Prone scap retract with ER                   SL Er/ ABD                   T-Band ER                   AROM Scaption  2# 3/10  2# 3/10  2# 3/10  2# 3/10    3/10  3/10  3/10 1#   UBE: Retro  5'  5'  5'  5'           Scap Retraction 3x10  3/10  3/10   3/10  3/10  3/10  3/10   Scap Depression 3x10  3/10  3/10    3/10  3/10  3/10  3/10   TB ER                   TB 45 degree Sh  Abd  L3 3/10  L4 3/10  L4 3/10  L4 3/10    L2 2/10  L2 3/10  L3 3/10    Isometrics Abd, ER  3/10  3x10  3/10  3/10  3/10  3/10  3/10  3/10    Table PROM ER  3/10  3x10  3/10  3/10  3/10  3/10  3/10  3/10   Stool slide back with AAROM           3/10  3/10  3/10   Body Blade                         Modalities  1/17/19  1/22/19  1/29/19  1/31/19  12/31/18  1/3/19  1/10/19  1/15/19   MHP                   CP time  10'  10'  10'  10'  10'  10'  10'   Stim

## 2019-02-05 ENCOUNTER — OFFICE VISIT (OUTPATIENT)
Dept: PHYSICAL THERAPY | Facility: CLINIC | Age: 39
End: 2019-02-05
Payer: COMMERCIAL

## 2019-02-05 DIAGNOSIS — S43.431D SUPERIOR GLENOID LABRUM LESION OF RIGHT SHOULDER, SUBSEQUENT ENCOUNTER: ICD-10-CM

## 2019-02-05 DIAGNOSIS — Z98.890 STATUS POST ARTHROSCOPY OF RIGHT SHOULDER: Primary | ICD-10-CM

## 2019-02-05 DIAGNOSIS — S43.431D LABRAL TEAR OF SHOULDER, RIGHT, SUBSEQUENT ENCOUNTER: ICD-10-CM

## 2019-02-05 PROCEDURE — 97110 THERAPEUTIC EXERCISES: CPT

## 2019-02-05 PROCEDURE — 97140 MANUAL THERAPY 1/> REGIONS: CPT

## 2019-02-05 NOTE — PROGRESS NOTES
Daily Note     Today's date: 2019  Patient name: Jimmy Martinez  : 1980  MRN: [id number]  Referring provider: Master Musa MD  Dx:   Encounter Diagnosis     ICD-10-CM    1  Status post arthroscopy of right shoulder Z98 890    2  Labral tear of shoulder, right, subsequent encounter S43 431D    3  Superior glenoid labrum lesion of right shoulder, subsequent encounter S43 431D                   Subjective: Pt reports he cont to do well but tends to be limited with overhead strength  Objective: See treatment diary below      Assessment: Tolerated treatment well  Patient demonstrated fatigue post treatment and exhibited good technique with therapeutic exercises  Pt with cont weakness into shoulder Abd and ER  Pt cont to gain strength making slow steady improvements  Plan: Continue per plan of care         Precautions: R Shoulder SLAP repair protocol     Daily Treatment Diary     HEP: Sheet provided and discussed     Manual  1/17/19  1/22/19  1/29/19  1/31/19  2/5/19 1/10/19   1/15/19       ART x15'          x20'         IASTM                     JM                     PROM and UE stretch  15'  15'  15'               STM/Triggerpoint      15'  15'  15'    15'          Exercise Diary  1/17/19  1/22/19  1/29/19  1/31/19 2/5/19  1/3/19  1/10/19  1/15/19   Pendulums            5'  5'  5'   Pullies 3x10  3/10  3/10  3/10  3/10  3/10  3/10  3/10   Wand ER/Flx 3x10 ea  3/10  3/10  3/10  3/10  3/10  3/10  3/10   Table Slides Scapular plain 3x10  3/10  3/10  3/10    3/10  3/10  3/10   Front Wall Slides  3/10 3/10  3/10  3/10  3/10  3/10  3/10  3/10   Ball wall circles  3/10 ea  3/10 ea   3/10 ea        3/10  3/10   T-Band: Row      L4 3/10  L4 3/10  L5 3/10         TB LTP      L4 3/10  L4 3/10  L5 3/10         TB No Moneys  L3 3/10 L3 3/10  L3 3/10  L3 3/10  L3 3/10  3/10 no resistance  3/10  3/10 L3   Prone Scap retract      3x10  3/10  3/10         Wall [place]     2x10  2/10  3/10         Wall [place]     2x10  2/10  3/10          Supine no moneys, wall angels, field goals       3/10 ea   3/10         Prone Abd, Scap    0# 3/10  1# 3/10  2# 3/10  2# 3/10         Prone scap retract with ER                   SL Er/ ABD                   T-Band ER                   AROM Scaption  2# 3/10  2# 3/10  2# 3/10  2# 3/10  2# 3/10  3/10  3/10  3/10 1#   UBE: Retro  5'  5'  5'  5'  5'         Scap Retraction 3x10  3/10  3/10     3/10  3/10  3/10   Scap Depression 3x10  3/10  3/10      3/10  3/10  3/10   TB ER                   TB 45 degree Sh  Abd  L3 3/10  L4 3/10  L4 3/10  L4 3/10  L4 3/10  L2 2/10  L2 3/10  L3 3/10    Isometrics Abd, ER  3/10  3x10  3/10  3/10  3/10  3/10  3/10  3/10    Table PROM ER  3/10  3x10  3/10  3/10  3/10  3/10  3/10  3/10   Stool slide back with AAROM           3/10  3/10  3/10   OH ball wall dribble         30"x6               Modalities  1/17/19  1/22/19  1/29/19  1/31/19 2/5/19  1/3/19  1/10/19  1/15/19   MHP                   CP time  10'  10'  10'  10'  10'  10'  10'   Stim

## 2019-02-07 ENCOUNTER — APPOINTMENT (OUTPATIENT)
Dept: PHYSICAL THERAPY | Facility: CLINIC | Age: 39
End: 2019-02-07
Payer: COMMERCIAL

## 2019-02-08 ENCOUNTER — OFFICE VISIT (OUTPATIENT)
Dept: PHYSICAL THERAPY | Facility: CLINIC | Age: 39
End: 2019-02-08
Payer: COMMERCIAL

## 2019-02-08 DIAGNOSIS — Z98.890 STATUS POST ARTHROSCOPY OF RIGHT SHOULDER: Primary | ICD-10-CM

## 2019-02-08 DIAGNOSIS — S43.431D SUPERIOR GLENOID LABRUM LESION OF RIGHT SHOULDER, SUBSEQUENT ENCOUNTER: ICD-10-CM

## 2019-02-08 DIAGNOSIS — S43.431D LABRAL TEAR OF SHOULDER, RIGHT, SUBSEQUENT ENCOUNTER: ICD-10-CM

## 2019-02-08 PROCEDURE — 97110 THERAPEUTIC EXERCISES: CPT

## 2019-02-08 PROCEDURE — 97140 MANUAL THERAPY 1/> REGIONS: CPT

## 2019-02-08 NOTE — PROGRESS NOTES
Daily Note     Today's date: 2019  Patient name: Mary Ann Aguayo  : 1980  MRN: [id number]  Referring provider: Sophia Wood MD  Dx:   Encounter Diagnosis     ICD-10-CM    1  Status post arthroscopy of right shoulder Z98 890    2  Labral tear of shoulder, right, subsequent encounter S43 431D    3  Superior glenoid labrum lesion of right shoulder, subsequent encounter S43 431D                   Subjective: Pt reports he cont to do well and is progressing  Objective: See treatment diary below      Assessment: Tolerated treatment well  Patient demonstrated fatigue post treatment and exhibited good technique with therapeutic exercises  Pt cont to progress program with improved arom into shoulder ER  Pt completes program with overall improved simona  Plan: Continue per plan of care        Precautions: R Shoulder SLAP repair protocol     Daily Treatment Diary     HEP: Sheet provided and discussed     Manual  1/17/19  1/22/19  1/29/19  1/31/19  2/5/19 2/8/19  1/15/19       ART x15'                   IASTM                     JM                     PROM and UE stretch  15'  15'  15'               STM/Triggerpoint      15'  15'  15'  15'  15'          Exercise Diary  1/17/19  1/22/19  1/29/19  1/31/19 2/5/19  2/8/19  1/10/19  1/15/19   Pendulums              5'  5'   Pullies 3x10  3/10  3/10  3/10  3/10  3/10  3/10  3/10   Wand ER/Flx 3x10 ea  3/10  3/10  3/10  3/10  3/10  3/10  3/10   Table Slides Scapular plain 3x10  3/10  3/10  3/10    3/10  3/10  3/10   Front Wall Slides  3/10 3/10  3/10  3/10  3/10  3/10  3/10  3/10   Ball wall circles  3/10 ea  3/10 ea   3/10 ea        3/10  3/10   T-Band: Row      L4 3/10  L4 3/10  L5 3/10  L5 3/10       TB LTP      L4 3/10  L4 3/10  L5 3/10  L5 3/19       TB No Moneys  L3 3/10 L3 3/10  L3 3/10  L3 3/10  L3 3/10  3/10 L3  3/10  3/10 L3   Prone Scap retract      3x10  3/10  3/10  3/10       Wall [place]     2x10  2/10  3/10  3/10       Wall [place]     2x10  2/10  3/10  3/10        Supine no moneys, wall angels, field goals       3/10 ea   3/10  3/10       Prone Abd, Scap    0# 3/10  1# 3/10  2# 3/10  2# 3/10  2# 3/10       Prone scap retract with ER                   SL Er/ ABD                   T-Band ER                   AROM Scaption  2# 3/10  2# 3/10  2# 3/10  2# 3/10  2# 3/10  3/10  3/10  3/10 1#   UBE: Retro  5'  5'  5'  5'  5'         Scap Retraction 3x10  3/10  3/10      3/10  3/10  3/10   Scap Depression 3x10  3/10  3/10      3/10  3/10  3/10   TB ER                   TB 45 degree Sh  Abd  L3 3/10  L4 3/10  L4 3/10  L4 3/10  L4 3/10  L2 2/10  L2 3/10  L3 3/10    Isometrics Abd, ER  3/10  3x10  3/10  3/10  3/10  3/10  3/10  3/10    Table PROM ER  3/10  3x10  3/10  3/10  3/10  3/10  3/10  3/10   Stool slide back with AAROM           3/10  3/10  3/10   OH ball wall dribble         30"x6  30"/6             Modalities  1/17/19  1/22/19  1/29/19  1/31/19 2/5/19  2/8/19  1/10/19  1/15/19   MHP                   CP time  10'  10'  10'  10'  refused  10'  10'   Stim

## 2019-02-12 ENCOUNTER — OFFICE VISIT (OUTPATIENT)
Dept: PHYSICAL THERAPY | Facility: CLINIC | Age: 39
End: 2019-02-12
Payer: COMMERCIAL

## 2019-02-12 DIAGNOSIS — S43.431D SUPERIOR GLENOID LABRUM LESION OF RIGHT SHOULDER, SUBSEQUENT ENCOUNTER: ICD-10-CM

## 2019-02-12 DIAGNOSIS — Z98.890 STATUS POST ARTHROSCOPY OF RIGHT SHOULDER: Primary | ICD-10-CM

## 2019-02-12 DIAGNOSIS — S43.431D LABRAL TEAR OF SHOULDER, RIGHT, SUBSEQUENT ENCOUNTER: ICD-10-CM

## 2019-02-12 PROCEDURE — 97112 NEUROMUSCULAR REEDUCATION: CPT | Performed by: PHYSICAL THERAPIST

## 2019-02-12 PROCEDURE — G8984 CARRY CURRENT STATUS: HCPCS | Performed by: PHYSICAL THERAPIST

## 2019-02-12 PROCEDURE — 97110 THERAPEUTIC EXERCISES: CPT | Performed by: PHYSICAL THERAPIST

## 2019-02-12 PROCEDURE — G8985 CARRY GOAL STATUS: HCPCS | Performed by: PHYSICAL THERAPIST

## 2019-02-12 PROCEDURE — 97140 MANUAL THERAPY 1/> REGIONS: CPT | Performed by: PHYSICAL THERAPIST

## 2019-02-12 PROCEDURE — 97530 THERAPEUTIC ACTIVITIES: CPT | Performed by: PHYSICAL THERAPIST

## 2019-02-12 NOTE — PROGRESS NOTES
PT Re-Evaluation     Today's date: 2019  Patient name: Billy Benjamin  : 1980  MRN: [id number]  Referring provider: Niesha Maciel MD  Dx:   Encounter Diagnosis     ICD-10-CM    1  Status post arthroscopy of right shoulder Z98 890    2  Labral tear of shoulder, right, subsequent encounter S43 431D    3  Superior glenoid labrum lesion of right shoulder, subsequent encounter S43 431D                   Assessment  Understanding of Dx/Px/POC: good   Prognosis: good    Goals  STGs: To be complete within 4-6 weeks  - Decrease pain to < 2/10 at worst (partially met)  - Increase AROM to WNL (partially met)  - Increase strength to > 4+/5 (partially met)  - Improve postural awareness capacity to > 60min before deficit (partially met)    LTGs: To be complete within 8 weeks  - Able to repetitively complete all overhead activity without pain or limitation for increased safety and functional capacity with ADLs and work-related duty (partially met)  - Able to repetitively complete all reaching activity without pain or limitation for increased safety and functional capacity with ADLs and work-related duty (partially met)  - Able to repetitively complete all pushing/pulling activity without pain or limitation for increased safety and functional capacity with ADLs and work-related duty (partially met)  - Able to complete all lifting/carrying activity without pain or limitation for increased safety and functional capacity with ADLs and work-related duty (partially met)    Plan  Frequency: 3x week  Duration in weeks: 4         Upon completion of today's re-evaluation, as evidenced by present objective and subjective measures, Juan Diego's sx remain consistent with continued, well-paced progress, as per protocol from being s/p R Shoulder SLAP repair 18  Stability and strength continue to show improvements steadily, but patient still not where he needs to be in regards to safety for full return to work   Patient will benefit from continued skilled physical therapy to address current deficits  Subjective Evaluation    Pain  Current pain ratin  At best pain ratin  At worst pain ratin  Location: R Shoulder         Pt reports he feels like he continues to see improvement each week  Reports his strength and stability are improving, but still weak in the grand scheme of ADLs and work-related duty  Pt reports he intermittently has trouble sleeping due to achyness  Hoping that continues to improve  Anxious to continue making progress      Objective Pain level ranges 0-5/10  AROM: Flexion 180 degrees, Abd 180 degrees, IR 50 degrees, ER 90 degrees  Strength: Flexion 4/5, Abd 4-/5, ER 4-/5, IR 4/5  Postural Awareness: Fair and improving (rounded shoulders, forward head)  Able to complete overhead activity at 50% capacity with mild pain and limitation  Able to complete pushing/pulling activity at 50% capacity with mild pain and limitation  Able to complete lifting/carrying activity at 50% capacity with mild pain and limitation  Able to complete cross body/behind the back reaching activity at 50% capacity with mild pain and limitation    Flowsheet Rows      Most Recent Value   PT/OT G-Codes   FOTO information reviewed  -- [PSFS 6/10]   Assessment Type  Re-evaluation   G code set  Carrying, Moving & Handling Objects   Carrying, Moving and Handling Objects Current Status ()  CJ   Carrying, Moving and Handling Objects Goal Status ()  CH          Precautions: R Shoulder SLAP repair protocol     Daily Treatment Diary     HEP: Sheet provided and discussed     Manual  1/17/19  1/22/19  1/29/19  1/31/19  2/5/19 2/12/19  1/15/19       ART x15'                   IASTM                     JM                     PROM and UE stretch  15'  15'  15'               STM/Triggerpoint      15'  15'  15'  15'  15'          Exercise Diary  1/17/19  1/22/19  1/29/19  1/31/19 2/5/19  2/12/19  1/10/19  1/15/19   Pendulums              5'  5' Pullies 3x10  3/10  3/10  3/10  3/10    3/10  3/10   Wand ER/Flx 3x10 ea  3/10  3/10  3/10  3/10    3/10  3/10   Table Slides Scapular plain 3x10  3/10  3/10  3/10      3/10  3/10   Front Wall Slides  3/10 3/10  3/10  3/10  3/10    3/10  3/10   Ball wall circles  3/10 ea  3/10 ea   3/10 ea        3/10  3/10   T-Band: Row      L4 3/10  L4 3/10  L5 3/10  L5 3/10       TB LTP      L4 3/10  L4 3/10  L5 3/10  L5 3/19       TB No Moneys  L3 3/10 L3 3/10  L3 3/10  L3 3/10  L3 3/10  3/10 L3  3/10  3/10 L3   Prone Scap retract      3x10  3/10  3/10  3/10       Wall [place]     2x10  2/10  3/10  3/10       Wall fieldgoals     2x10  2/10  3/10  3/10        Supine no moneys, wall angels, field goals       3/10 ea   3/10  3/10       Prone Abd, Scap    0# 3/10  1# 3/10  2# 3/10  2# 3/10  2# 3/10       Prone scap retract with ER            3x10 2#       SL Er/ ABD            3x10 2#, 5#       T-Band 90/90            3x10 L2       AROM Scaption  2# 3/10  2# 3/10  2# 3/10  2# 3/10  2# 3/10  3/10  3/10  3/10 1#   UBE: Retro  5'  5'  5'  5'  5'  5'       Scap Retraction 3x10  3/10  3/10      3/10  3/10  3/10   Scap Depression 3x10  3/10  3/10      3/10  3/10  3/10   Prone Retract with ER            3x10 2#       TB 45 degree Sh  Abd  L3 3/10  L4 3/10  L4 3/10  L4 3/10  L4 3/10  L2 2/10  L2 3/10  L3 3/10    TB ER  3/10  3x10  3/10  3/10  3/10  4x10 L4  3/10  3/10    Table PROM ER  3/10  3x10  3/10  3/10  3/10    3/10  3/10   Body Blade           4 way 4x20''  3/10  3/10   OH ball wall dribble         30"x6  30"/6             Modalities  1/17/19  1/22/19  1/29/19  1/31/19 2/5/19  2/12/19  1/10/19  1/15/19   MHP                   CP time  10'  10'  10'  10'  x10'  10'  10'   Stim

## 2019-02-14 ENCOUNTER — OFFICE VISIT (OUTPATIENT)
Dept: PHYSICAL THERAPY | Facility: CLINIC | Age: 39
End: 2019-02-14
Payer: COMMERCIAL

## 2019-02-14 DIAGNOSIS — S43.431D LABRAL TEAR OF SHOULDER, RIGHT, SUBSEQUENT ENCOUNTER: ICD-10-CM

## 2019-02-14 DIAGNOSIS — S43.431D SUPERIOR GLENOID LABRUM LESION OF RIGHT SHOULDER, SUBSEQUENT ENCOUNTER: ICD-10-CM

## 2019-02-14 DIAGNOSIS — Z98.890 STATUS POST ARTHROSCOPY OF RIGHT SHOULDER: Primary | ICD-10-CM

## 2019-02-14 PROCEDURE — 97110 THERAPEUTIC EXERCISES: CPT

## 2019-02-14 PROCEDURE — 97010 HOT OR COLD PACKS THERAPY: CPT

## 2019-02-14 PROCEDURE — 97140 MANUAL THERAPY 1/> REGIONS: CPT

## 2019-02-14 NOTE — PROGRESS NOTES
Daily Note     Today's date: 2019  Patient name: Tank Trejo  : 1980  MRN: [id number]  Referring provider: Sunitha Martel MD  Dx:   Encounter Diagnosis     ICD-10-CM    1  Status post arthroscopy of right shoulder Z98 890    2  Labral tear of shoulder, right, subsequent encounter S43 431D    3  Superior glenoid labrum lesion of right shoulder, subsequent encounter S43 431D                   Subjective: Pt reports he is doing well with mild soreness  Objective: See treatment diary below      Assessment: Tolerated treatment well  Patient demonstrated fatigue post treatment and exhibited good technique with therapeutic exercises  Pt with cont restriction with standing ER with wall angels  Pt simona remainder of program well  Plan: Continue per plan of care        Precautions: R Shoulder SLAP repair protocol     Daily Treatment Diary     HEP: Sheet provided and discussed     Manual  19       ART x15'                   IASTM                     JM                     PROM and UE stretch  15'  15'  15'               STM/Triggerpoint      15'  15'  15'  15'  15'          Exercise Diary  1/17/19  1/22/19  1/29/19  1/31/19 2/5/19  2/12/19 2/14/19  1/15/19   Pendulums                5'   Pullies 3x10  3/10  3/10  3/10  3/10      3/10   Wand ER/Flx 3x10 ea  3/10  3/10  3/10  3/10      3/10   Table Slides Scapular plain 3x10  3/10  3/10  3/10        3/10   Front Wall Slides  3/10 3/10  3/10  3/10  3/10      3/10   Ball wall circles  3/10 ea  3/10 ea   3/10 ea          3/10   T-Band: Row      L4 3/10  L4 3/10  L5 3/10  L5 3/10 L5 3/10     TB LTP      L4 3/10  L4 3/10  L5 3/10  L5 3/19  L5 3/10     TB No Moneys  L3 3/10 L3 3/10  L3 3/10  L3 3/10  L3 3/10  3/10 L3  3/10 L3  3/10 L3   Prone Scap retract      3x10  3/10  3/10  3/10  3/10     Wall DISH     2x10  2/10  3/10  3/10  3/10     Wall fieldgoals     2x10  2/10  3/10  3/10  3/10      Supine no moneys, wall angels, field goals       3/10 ea   3/10  3/10  3/10     Prone Abd, Scap    0# 3/10  1# 3/10  2# 3/10  2# 3/10  2# 3/10  2# 3/10     Prone scap retract with ER            3x10 2#  3/10 2#     SL Er/ ABD            3x10 2#, 5#  3/10 2#     T-Band 90/90            3x10 L2  3/10 L2     AROM Scaption  2# 3/10  2# 3/10  2# 3/10  2# 3/10  2# 3/10  3/10  3/10  3/10 1#   UBE: Retro  5'  5'  5'  5'  5'  5'  5'     Scap Retraction 3x10  3/10  3/10      3/10  3/10  3/10   Scap Depression 3x10  3/10  3/10      3/10  3/10  3/10   Prone Retract with ER            3x10 2#       TB 45 degree Sh  Abd  L3 3/10  L4 3/10  L4 3/10  L4 3/10  L4 3/10  L2 2/10  L2 3/10  L3 3/10    TB ER  3/10  3x10  3/10  3/10  3/10  4x10 L4  3/10  3/10    Table PROM ER  3/10  3x10  3/10  3/10  3/10    3/10  3/10   Body Blade           4 way 4x20''  4 way 4/20"  3/10   OH ball wall dribble         30"x6  30"/6  30" x 6           Modalities  1/17/19  1/22/19  1/29/19  1/31/19 2/5/19  2/12/19  2/14/19  1/15/19   MHP                   CP time  10'  10'  10'  10'  x10'  10'  10'   Stim

## 2019-02-19 ENCOUNTER — OFFICE VISIT (OUTPATIENT)
Dept: PHYSICAL THERAPY | Facility: CLINIC | Age: 39
End: 2019-02-19
Payer: COMMERCIAL

## 2019-02-19 DIAGNOSIS — Z98.890 STATUS POST ARTHROSCOPY OF RIGHT SHOULDER: Primary | ICD-10-CM

## 2019-02-19 DIAGNOSIS — S43.431D SUPERIOR GLENOID LABRUM LESION OF RIGHT SHOULDER, SUBSEQUENT ENCOUNTER: ICD-10-CM

## 2019-02-19 DIAGNOSIS — S43.431D LABRAL TEAR OF SHOULDER, RIGHT, SUBSEQUENT ENCOUNTER: ICD-10-CM

## 2019-02-19 PROCEDURE — 97110 THERAPEUTIC EXERCISES: CPT

## 2019-02-19 PROCEDURE — 97140 MANUAL THERAPY 1/> REGIONS: CPT

## 2019-02-19 NOTE — PROGRESS NOTES
Daily Note     Today's date: 2019  Patient name: Kelsey Palomino  : 1980  MRN: [id number]  Referring provider: Jessica Anand MD  Dx:   Encounter Diagnosis     ICD-10-CM    1  Status post arthroscopy of right shoulder [id number] [id number]    2  Labral tear of shoulder, right, subsequent encounter S43 431D    3  Superior glenoid labrum lesion of right shoulder, subsequent encounter S43 431D                   Subjective: Pt reports he cont to do well and is slowly getting stronger  Objective: See treatment diary below      Assessment: Tolerated treatment well  Patient demonstrated fatigue post treatment and exhibited good technique with therapeutic exercises  Pt with improved ER rom into field goal position  Pt cont to make gains however cont to have fatigue into shoulder ER  Plan: Continue per plan of care        Precautions: R Shoulder SLAP repair protocol     Daily Treatment Diary     HEP: Sheet provided and discussed     Manual  19     ART x15'                   IASTM                     JM                     PROM and UE stretch  15'  15'  15'          15'     STM/Triggerpoint      15'  15'  15'  15'  15'          Exercise Diary  19   Pendulums                  Pullies 3x10  3/10  3/10  3/10  3/10        Wand ER/Flx 3x10 ea  3/10  3/10  3/10  3/10         Table Slides Scapular plain 3x10  3/10  3/10  3/10           Front Wall Slides  3/10 3/10  3/10  3/10  3/10         Ball wall circles  3/10 ea  3/10 ea   3/10 ea              T-Band: Row      L4 3/10  L4 3/10  L5 3/10  L5 3/10 L5 3/10  L5 3/10   TB LTP      L4 3/10  L4 3/10  L5 3/10  L5 3/19  L5 3/10  L5 3/10   TB No Moneys  L3 3/10 L3 3/10  L3 3/10  L3 3/10  L3 3/10  3/10 L3  3/10 L3  3/10 L3   Prone Scap retract      3x10  3/10  3/10  3/10  3/10  3/10   Wall [place]     2x10  2/10  3/10  3/10  3/10  3/10 1#   Wall fieldgoals     2x10  2/10  3/10  3/10  3/10  2# 3/10    Supine no moneys, wall angels, field goals       3/10 ea   3/10  3/10  3/10  3/10   Prone Abd, Scap    0# 3/10  1# 3/10  2# 3/10  2# 3/10  2# 3/10  2# 3/10 2# 3/10   Prone scap retract with ER            3x10 2#  3/10 2#  3/10 2#   SL Er/ ABD            3x10 2#, 5#  3/10 2#  3/10 2#   T-Band 90/90            3x10 L2  3/10 L2  3/10 L2   AROM Scaption  2# 3/10  2# 3/10  2# 3/10  2# 3/10  2# 3/10  3/10  3/10  3/10 1#   UBE: Retro  5'  5'  5'  5'  5'  5'  5'  5'   Scap Retraction 3x10  3/10  3/10      3/10  3/10  3/10   Scap Depression 3x10  3/10  3/10      3/10  3/10  3/10   Prone Retract with ER            3x10 2#    3/10 2#   TB 45 degree Sh  Abd  L3 3/10  L4 3/10  L4 3/10  L4 3/10  L4 3/10  L2 2/10  L2 3/10  L3 3/10    TB ER  3/10  3x10  3/10  3/10  3/10  4x10 L4  3/10  3/10    Table PROM ER  3/10  3x10  3/10  3/10  3/10    3/10  3/10   Body Blade           4 way 4x20''  4 way 4/20"  3/10   OH ball wall dribble         30"x6  30"/6  30" x 6  30"/6         Modalities  1/17/19 1/22/19 1/29/19 1/31/19 2/5/19 2/12/19 2/14/19 2/19/19   MHP                   CP time  10'  10'  10'  10'  x10'  10'     Stim

## 2019-02-21 ENCOUNTER — APPOINTMENT (OUTPATIENT)
Dept: PHYSICAL THERAPY | Facility: CLINIC | Age: 39
End: 2019-02-21
Payer: COMMERCIAL

## 2019-02-21 DIAGNOSIS — R05.9 COUGH: Primary | ICD-10-CM

## 2019-02-21 RX ORDER — BENZONATATE 100 MG/1
100 CAPSULE ORAL 3 TIMES DAILY PRN
Qty: 20 CAPSULE | Refills: 0 | Status: SHIPPED | OUTPATIENT
Start: 2019-02-21 | End: 2019-04-15 | Stop reason: ALTCHOICE

## 2019-02-21 NOTE — TELEPHONE ENCOUNTER
Regarding: FW: Non-Urgent Medical Question  Contact: [phone]  I would rx tessalon perles tid for the cough  That has been lingering for many patients  Make sure drinking lots of fluids and woul try the cough suppressant  ----- Message -----  From: Raleigh Bunch MA  Sent: 2/21/2019  10:38 AM  To: Casey Ordaz DO  Subject: FW: Non-Urgent Medical Question                      ----- Message -----  From: Darron Nava  Sent: 2/21/2019  10:29 AM  To: [facility]  Subject: Non-Urgent Medical Question                      ----- Message from [address] [place] [address] 951, [facility] sent at 2/21/2019 10:29 AM EST -----    Dr-    Late last week I got sick with a fever and chills, and took DayQuil and Nightquil, which helped break the fever in 2 days or so  Since then, I developed this nagging cough, which I assumed was a result some post-nasal drip  I began taking Mucinex, which did dry me out for the most part, but I still have bouts of coughing that cause me to choke and at times vomit  Couple that with the fact that I have lost my voice, I was wondering if I should just continue with the mucinex or do I need something stronger   Thanks     [employer]

## 2019-02-25 ENCOUNTER — OFFICE VISIT (OUTPATIENT)
Dept: PHYSICAL THERAPY | Facility: CLINIC | Age: 39
End: 2019-02-25
Payer: COMMERCIAL

## 2019-02-25 DIAGNOSIS — Z98.890 STATUS POST ARTHROSCOPY OF RIGHT SHOULDER: Primary | ICD-10-CM

## 2019-02-25 DIAGNOSIS — S43.431D SUPERIOR GLENOID LABRUM LESION OF RIGHT SHOULDER, SUBSEQUENT ENCOUNTER: ICD-10-CM

## 2019-02-25 DIAGNOSIS — S43.431D LABRAL TEAR OF SHOULDER, RIGHT, SUBSEQUENT ENCOUNTER: ICD-10-CM

## 2019-02-25 PROCEDURE — 97140 MANUAL THERAPY 1/> REGIONS: CPT

## 2019-02-25 PROCEDURE — 97010 HOT OR COLD PACKS THERAPY: CPT

## 2019-02-25 PROCEDURE — 97110 THERAPEUTIC EXERCISES: CPT

## 2019-02-25 NOTE — PROGRESS NOTES
Daily Note     Today's date: 2019  Patient name: Cary Manuel  : 1980  MRN: [id number]  Referring provider: Rosalba Keller MD  Dx:   Encounter Diagnosis     ICD-10-CM    1  Status post arthroscopy of right shoulder Z98 890    2  Labral tear of shoulder, right, subsequent encounter S43 431D    3  Superior glenoid labrum lesion of right shoulder, subsequent encounter S43 431D                   Subjective: Pt reports he will see MD at end of week  States he is doing well but does have some cont weakness with ER and overhead strength  Objective: See treatment diary below      Assessment: Tolerated treatment well  Patient demonstrated fatigue post treatment and exhibited good technique with therapeutic exercises  Pt cont to make gains with overall rom and strength  Pt able to complete program with overall improved discomfort  Plan: Continue per plan of care       Precautions: R Shoulder SLAP repair protocol     Daily Treatment Diary     HEP: Sheet provided and discussed     Manual  19     ART                    IASTM                     JM                     PROM and UE stretch  15'  15'  15'          15'     STM/Triggerpoint      15'  15'  15'  15'  15'          Exercise Diary  19   Pendulums                   Pullies 3x10  3/10  3/10  3/10  3/10         Wand ER/Flx 3x10 ea  3/10  3/10  3/10  3/10         Table Slides Scapular plain 3x10  3/10  3/10  3/10           Front Wall Slides  3/10 3/10  3/10  3/10  3/10         Ball wall circles  3/10 ea  3/10 ea   3/10 ea              T-Band: Row  L5 3/10    L4 3/10  L4 3/10  L5 3/10  L5 3/10 L5 3/10  L5 3/10   TB LTP  L5 3/10    L4 3/10  L4 3/10  L5 3/10  L5 3/19  L5 3/10  L5 3/10   TB No Moneys  L3 3/10 L3 3/10  L3 3/10  L3 3/10  L3 3/10  3/10 L3  3/10 L3  3/10 L3   Prone Scap retract  3/10    3x10  3/10  3/10  3/10  3/10  3/10   Wall [place]   3/10 2#    2x10  2/10  3/10  3/10  3/10  3/10 1#   Wall fieldgoals  2# 3/10   2x10  2/10  3/10  3/10  3/10  2# 3/10    Supine no moneys, wall angels, field goals  3/10     3/10 ea   3/10  3/10  3/10  3/10   Prone Abd, Scap  2# 3/10  0# 3/10  1# 3/10  2# 3/10  2# 3/10  2# 3/10  2# 3/10 2# 3/10   Prone scap retract with ER  2# 3/10          3x10 2#  3/10 2#  3/10 2#   SL Er/ ABD  2# 3/10          3x10 2#, 5#  3/10 2#  3/10 2#   T-Band 90/90  L2 3/10          3x10 L2  3/10 L2  3/10 L2   AROM Scaption  2# 3/10  2# 3/10  2# 3/10  2# 3/10  2# 3/10  3/10  3/10  3/10 1#   UBE: Retro  5'  5'  5'  5'  5'  5'  5'  5'   Scap Retraction 3x10  3/10  3/10      3/10  3/10  3/10   Scap Depression 3x10  3/10  3/10      3/10  3/10  3/10   Prone Retract with ER  2# 3/10          3x10 2#    3/10 2#   TB 45 degree Sh  Abd  L3 3/10  L4 3/10  L4 3/10  L4 3/10  L4 3/10  L2 2/10  L2 3/10  L3 3/10    TB ER  3/10  3x10  3/10  3/10  3/10  4x10 L4  3/10  3/10    Table PROM ER  3/10  3x10  3/10  3/10  3/10    3/10  3/10   Body Blade  4/20" 4 way         4 way 4x20''  4 way 4/20"  3/10   OH ball wall dribble  30"/6       30"x6  30"/6  30" x 6  30"/6         Modalities  2/25/19 1/22/19 1/29/19 1/31/19 2/5/19 2/12/19 2/14/19 2/19/19   MHP                   CP 10'  10'  10'  10'  10'  x10'  10'     Stim

## 2019-02-28 ENCOUNTER — OFFICE VISIT (OUTPATIENT)
Dept: PHYSICAL THERAPY | Facility: CLINIC | Age: 39
End: 2019-02-28
Payer: COMMERCIAL

## 2019-02-28 DIAGNOSIS — S43.431D LABRAL TEAR OF SHOULDER, RIGHT, SUBSEQUENT ENCOUNTER: ICD-10-CM

## 2019-02-28 DIAGNOSIS — S43.431D SUPERIOR GLENOID LABRUM LESION OF RIGHT SHOULDER, SUBSEQUENT ENCOUNTER: ICD-10-CM

## 2019-02-28 DIAGNOSIS — J01.81 OTHER ACUTE RECURRENT SINUSITIS: Primary | ICD-10-CM

## 2019-02-28 DIAGNOSIS — Z98.890 STATUS POST ARTHROSCOPY OF RIGHT SHOULDER: Primary | ICD-10-CM

## 2019-02-28 PROCEDURE — 97112 NEUROMUSCULAR REEDUCATION: CPT | Performed by: PHYSICAL THERAPIST

## 2019-02-28 PROCEDURE — 97530 THERAPEUTIC ACTIVITIES: CPT | Performed by: PHYSICAL THERAPIST

## 2019-02-28 PROCEDURE — 97110 THERAPEUTIC EXERCISES: CPT | Performed by: PHYSICAL THERAPIST

## 2019-02-28 PROCEDURE — G8985 CARRY GOAL STATUS: HCPCS | Performed by: PHYSICAL THERAPIST

## 2019-02-28 PROCEDURE — G8984 CARRY CURRENT STATUS: HCPCS | Performed by: PHYSICAL THERAPIST

## 2019-02-28 PROCEDURE — 97140 MANUAL THERAPY 1/> REGIONS: CPT | Performed by: PHYSICAL THERAPIST

## 2019-02-28 RX ORDER — AZITHROMYCIN 250 MG/1
TABLET, FILM COATED ORAL
Qty: 6 TABLET | Refills: 0 | Status: SHIPPED | OUTPATIENT
Start: 2019-02-28 | End: 2019-03-04

## 2019-02-28 RX ORDER — METHYLPREDNISOLONE 4 MG/1
TABLET ORAL
Qty: 21 EACH | Refills: 0 | Status: SHIPPED | OUTPATIENT
Start: 2019-02-28 | End: 2019-05-12

## 2019-02-28 NOTE — PROGRESS NOTES
PT Re-Evaluation     Today's date: 2019  Patient name: Michael Petty  : 1980  MRN: [id number]  Referring provider: Jacquelyn Conteh MD  Dx:   Encounter Diagnosis     ICD-10-CM    1  Status post arthroscopy of right shoulder Z98 890    2  Labral tear of shoulder, right, subsequent encounter S43 431D    3  Superior glenoid labrum lesion of right shoulder, subsequent encounter S43 431D                   Assessment  Understanding of Dx/Px/POC: good   Prognosis: good    Goals  STGs: To be complete within 2 weeks  - Decrease pain to < 2/10 at worst (met)  - Increase AROM to WNL (met)  - Increase strength to > 5/5 (partially met)  - Improve postural awareness capacity to > 60min before deficit (met)    LTGs: To be complete within 4 weeks  - Able to repetitively complete all overhead activity without pain or limitation for increased safety and functional capacity with ADLs and work-related duty (partially met)  - Able to repetitively complete all reaching activity without pain or limitation for increased safety and functional capacity with ADLs and work-related duty (partially met)  - Able to repetitively complete all pushing/pulling activity without pain or limitation for increased safety and functional capacity with ADLs and work-related duty (partially met)  - Able to complete all lifting/carrying activity without pain or limitation for increased safety and functional capacity with ADLs and work-related duty (partially met)    Plan  Frequency: 2x week  Duration in weeks: 4         Upon completion of today's re-evaluation, as evidenced by present objective and subjective measures, Juan Diego's sx remain consistent with continued, well-paced progress, as per protocol from being s/p R Shoulder SLAP repair 18  Stability and strength continue to progress nicely  Pt is anxious to return to work   Patient will benefit from continued skilled physical therapy to address current deficits, as he transitions safely back to work, prior to safe D/C to HEP within 4 weeks  Subjective Evaluation    Pain  Current pain ratin  At best pain ratin  At worst pain rating: 3  Location: R Shoulder         Pt reports he feels ready to safely return to work  Pt reports he understands he would still benefit from physical therapy to continue developing stability and endurance in his R Shoulder  However, reports he feels about 85% improved and feels he can start back to work at that capacity  Pt reports he intends to continue with physical therapy over the next 4 weeks      Objective Pain level ranges 0-3/10  AROM: WNL  Strength: Flexion 5/5, Abd 4+/5, ER 5/5, IR 5/5  Postural Awareness: Good (rounded shoulders, forward head)  Able to complete overhead activity at > 85% capacity with mild to no pain and limitation  Able to complete pushing/pulling activity at > 85% capacity with mild to pain and limitation  Able to complete lifting/carrying activity at > 85% capacity with mild to no pain and limitation  Able to complete cross body/behind the back reaching activity at > 85% capacity with mild to no pain and limitation        Precautions: R Shoulder SLAP repair protocol     Daily Treatment Diary     HEP: Sheet provided and discussed     Manual  19     ART                    IASTM                     JM                     PROM and UE stretch  15'  15'  15'          15'     STM/Triggerpoint      15'  15'  15'  15'  15'          Exercise Diary  19   Pendulums                   Pullies 3x10  3/10  3/10  3/10  3/10         Wand ER/Flx 3x10 ea  3/10  3/10  3/10  3/10         Table Slides Scapular plain 3x10  3/10  3/10  3/10           Front Wall Slides  3/10 3/10  3/10  3/10  3/10         Ball wall circles  3/10 ea  3/10 ea   3/10 ea              T-Band: Row  L5 3/10    L4 3/10  L4 3/10  L5 3/10  L5 3/10 L5 3/10  L5 3/10   TB LTP  L5 3/10    L4 3/10  L4 3/10  L5 3/10  L5 3/19  L5 3/10  L5 3/10   TB No Moneys  L3 3/10 L3 3/10  L3 3/10  L3 3/10  L3 3/10  3/10 L3  3/10 L3  3/10 L3   Prone Scap retract  3/10    3x10  3/10  3/10  3/10  3/10  3/10   Wall [place]   3/10 2#    2x10  2/10  3/10  3/10  3/10  3/10 1#   Wall fieldgoals  2# 3/10   2x10  2/10  3/10  3/10  3/10  2# 3/10    Supine no moneys, wall angels, field goals  3/10     3/10 ea   3/10  3/10  3/10  3/10   Prone Abd, Scap  2# 3/10  0# 3/10  1# 3/10  2# 3/10  2# 3/10  2# 3/10  2# 3/10 2# 3/10   Prone scap retract with ER  2# 3/10          3x10 2#  3/10 2#  3/10 2#   SL Er/ ABD  2# 3/10          3x10 2#, 5#  3/10 2#  3/10 2#   T-Band 90/90  L2 3/10          3x10 L2  3/10 L2  3/10 L2   AROM Scaption  2# 3/10  2# 3/10  2# 3/10  2# 3/10  2# 3/10  3/10  3/10  3/10 1#   UBE: Retro  5'  5'  5'  5'  5'  5'  5'  5'   Scap Retraction 3x10  3/10  3/10      3/10  3/10  3/10   Scap Depression 3x10  3/10  3/10      3/10  3/10  3/10   Prone Retract with ER  2# 3/10          3x10 2#    3/10 2#   TB 45 degree Sh  Abd  L3 3/10  L4 3/10  L4 3/10  L4 3/10  L4 3/10  L2 2/10  L2 3/10  L3 3/10    TB ER  3/10  3x10  3/10  3/10  3/10  4x10 L4  3/10  3/10    Table PROM ER  3/10  3x10  3/10  3/10  3/10    3/10  3/10   Body Blade  4/20" 4 way         4 way 4x20''  4 way 4/20"  3/10   OH ball wall dribble  30"/6       30"x6  30"/6  30" x 6  30"/6         Modalities  2/28/19 1/22/19 1/29/19 1/31/19 2/5/19 2/12/19 2/14/19 2/19/19   MHP                   CP 10'  10'  10'  10'  10'  x10'  10'     Stim

## 2019-03-01 ENCOUNTER — OFFICE VISIT (OUTPATIENT)
Dept: OBGYN CLINIC | Facility: CLINIC | Age: 39
End: 2019-03-01
Payer: COMMERCIAL

## 2019-03-01 VITALS
WEIGHT: 302 LBS | HEART RATE: 80 BPM | DIASTOLIC BLOOD PRESSURE: 83 MMHG | BODY MASS INDEX: 38.76 KG/M2 | SYSTOLIC BLOOD PRESSURE: 134 MMHG | HEIGHT: 74 IN

## 2019-03-01 DIAGNOSIS — S43.431D LABRAL TEAR OF SHOULDER, RIGHT, SUBSEQUENT ENCOUNTER: ICD-10-CM

## 2019-03-01 DIAGNOSIS — Z98.890 STATUS POST ARTHROSCOPY OF RIGHT SHOULDER: Primary | ICD-10-CM

## 2019-03-01 PROCEDURE — 99212 OFFICE O/P EST SF 10 MIN: CPT | Performed by: ORTHOPAEDIC SURGERY

## 2019-03-01 NOTE — PROGRESS NOTES
45 y o male presents for three-month postop visit status post right shoulder arthroscopic slap repair procedure date 11/26/18  He is anxious return to work  Denies any major problems with the shoulder  He notes he has occasional mild discomfort lasting only a short amount of time  He reports that physical therapy says he can go back to work as well  He denies any numbness or tingling  Review of Systems  Review of systems negative unless otherwise specified in HPI    Past Medical History  Past Medical History:   Diagnosis Date    Aortic valve cusp abnormality     "bicuspid aortic valve"    Hypertension      Past Surgical History  Past Surgical History:   Procedure Laterality Date    ANTERIOR CRUCIATE LIGAMENT REPAIR Right     KNEE ARTHROSCOPY Right     OTHER SURGICAL HISTORY      SAE    VA SHLDR ARTHROSCOP,SURG,REPAIR,SLAP LESION Left 5/26/2016    Procedure: SHOULDER ARTHROSCOPY SLAP REPAIR;  Surgeon: Bandar Le MD;  Location: [facility];  Service: Orthopedics    VA SHLDR ARTHROSCOP,SURG,REPAIR,SLAP LESION Right 11/26/2018    Procedure: Shoulder Arthroscopy with labral repair and biceps tenotomy;  Surgeon: Bandar Le MD;  Location: [facility];  Service: Orthopedics    ROTATOR CUFF REPAIR Left     VASECTOMY       Current Medications  Current Outpatient Medications on File Prior to Visit   Medication Sig Dispense Refill    azithromycin (ZITHROMAX) 250 mg tablet Take 2 Tablets on day 1    Take 1 tablet daily on days 2 thru 5  6 tablet 0    benzonatate (TESSALON PERLES) 100 mg capsule Take 1 capsule (100 mg total) by mouth 3 (three) times a day as needed for cough 20 capsule 0    lisinopril-hydrochlorothiazide (PRINZIDE,ZESTORETIC) 10-12 5 MG per tablet Take 1 tablet by mouth daily 30 tablet 5    methylPREDNISolone 4 MG tablet therapy pack Use as directed on package 21 each 0    oxyCODONE-acetaminophen (PERCOCET) 5-325 mg per tablet Take 1 tablet by mouth every 6 (six) hours as needed for moderate pain for up to 30 doses Max Daily Amount: 4 tablets (Patient not taking: Reported on 3/1/2019) 30 tablet 0     No current facility-administered medications on file prior to visit  Recent Labs [facility] EFREN)  0   Lab Value Date/Time    HCT 43 6 10/31/2018 0748    HGB 14 8 10/31/2018 0748    WBC 8 70 10/31/2018 0748    INR 1 01 10/31/2018 0748    GLUCOSE 98 11/28/2016 1950     Physical exam  · General: Awake, Alert, Oriented  · Eyes: Pupils equal, round and reactive to light  · Heart: regular rate and rhythm  · Lungs: No audible wheezing  · Abdomen: soft  right Shoulder  · Incisions healed nicely without the fall  Forward flexion passively is 170°  Internal rotation behind back is to level of L1  Flexion strength 5/5 abduction 4+/5 internal external rotation 5/5  Negative speed's test   Negative Jaimie's test   Grossly neurovascularly intact  Procedure  Right shoulder arthroscopic slap repair with biceps tenotomy 11/26/2018    Imaging  None    1  Status post arthroscopy of right shoulder    2  Labral tear of shoulder, right, subsequent encounter      Assessment:  right shoulder 3 months status post arthroscopic slap repair, doing well  Plan: We will allow patient return to work as he feels he can safely protect himself on the job starting March 6, 2019 without restrictions  He will continue formal physical therapy for another 3-4 weeks twice a week to maximize strength gains  We will see the patient back for final visit in 2 months

## 2019-03-01 NOTE — LETTER
March 1, 2019     Patient: Cary Manuel   YOB: 1980   Date of Visit: 3/1/2019       To Whom It May Concern: It is my medical opinion that Cary Manuel may return to work on 03/06/2018 full duty without restrictions  If you have any questions or concerns, please don't hesitate to call           Sincerely,         Ac Perez PA-C  CC: No Recipients

## 2019-03-01 NOTE — PATIENT INSTRUCTIONS
We will allow patient return to work as he feels he can safely protect himself on the job starting March 6, 2019 without restrictions  He will continue formal physical therapy for another 3-4 weeks twice a week to maximize strength gains  We will see the patient back for final visit in 2 months

## 2019-03-05 ENCOUNTER — OFFICE VISIT (OUTPATIENT)
Dept: PHYSICAL THERAPY | Facility: CLINIC | Age: 39
End: 2019-03-05
Payer: COMMERCIAL

## 2019-03-05 DIAGNOSIS — S43.431D LABRAL TEAR OF SHOULDER, RIGHT, SUBSEQUENT ENCOUNTER: ICD-10-CM

## 2019-03-05 DIAGNOSIS — S43.431D SUPERIOR GLENOID LABRUM LESION OF RIGHT SHOULDER, SUBSEQUENT ENCOUNTER: ICD-10-CM

## 2019-03-05 DIAGNOSIS — Z98.890 STATUS POST ARTHROSCOPY OF RIGHT SHOULDER: Primary | ICD-10-CM

## 2019-03-05 PROCEDURE — 97110 THERAPEUTIC EXERCISES: CPT

## 2019-03-05 PROCEDURE — 97010 HOT OR COLD PACKS THERAPY: CPT

## 2019-03-05 PROCEDURE — 97140 MANUAL THERAPY 1/> REGIONS: CPT

## 2019-03-05 NOTE — PROGRESS NOTES
Daily Note     Today's date: 3/5/2019  Patient name: Trudi Alex  : 1980  MRN: [id number]  Referring provider: Zofia Davis MD  Dx:   Encounter Diagnosis     ICD-10-CM    1  Status post arthroscopy of right shoulder Z98 890    2  Labral tear of shoulder, right, subsequent encounter S43 431D    3  Superior glenoid labrum lesion of right shoulder, subsequent encounter S43 431D                   Subjective: Pt reports he is doing well and will RTW tomorrow  Reports he will cont PT 1x per week as per his work schedule  Objective: See treatment diary below      Assessment: Tolerated treatment well  Patient demonstrated fatigue post treatment and exhibited good technique with therapeutic exercises  Pt simona increased weights today completing with some fatigue however no increased pain  Plan: Continue per plan of care       Precautions: R Shoulder SLAP repair protocol     Daily Treatment Diary     HEP: Sheet provided and discussed     Manual  2/28/19  3/5/19  1/29/19  1/31/19  2/5/19 2/12/19  2/14/19  2/19/19     ART                     IASTM                     JM                     PROM and UE stretch  15'  15'  15'          15'     STM/Triggerpoint      15'  15'  15'  15'  15'          Exercise Diary  2/28/19  3/5/19  1/29/19  1/31/19 2/5/19  2/12/19 2/14/19  2/19/19   Pendulums                   Pullies 3x10  3/10  3/10  3/10  3/10         Wand ER/Flx 3x10 ea  3/10  3/10  3/10  3/10         Table Slides Scapular plain 3x10  3/10  3/10  3/10           Front Wall Slides  3/10 3/10  3/10  3/10  3/10         Ball wall circles  3/10 ea  3/10 ea   3/10 ea              T-Band: Row  L5 3/10  L5 3/10  L4 3/10  L4 3/10  L5 3/10  L5 3/10 L5 3/10  L5 3/10   TB LTP  L5 3/10  L5 3/10  L4 3/10  L4 3/10  L5 3/10  L5 3/19  L5 3/10  L5 3/10   TB No Moneys  L3 3/10 L4 3/10  L3 3/10  L3 3/10  L3 3/10  3/10 L3  3/10 L3  3/10 L3   Prone Scap retract  3/10  3/10  3x10  3/10  3/10  3/10  3/10  3/10   Wall [place]   3/10 2#   3/10 3# 2x10  2/10  3/10  3/10  3/10  3/10 1#   Wall fieldgoals  2# 3/10  3# 3/10 2x10  2/10  3/10  3/10  3/10  2# 3/10    Supine no moneys, wall angels, field goals  3/10     3/10 ea   3/10  3/10  3/10  3/10   Prone Abd, Scap  2# 3/10  3# 3/10  1# 3/10  2# 3/10  2# 3/10  2# 3/10  2# 3/10 2# 3/10   Prone scap retract with ER  2# 3/10  3# 3/10        3x10 2#  3/10 2#  3/10 2#   SL Er/ ABD  2# 3/10  3# 3/10        3x10 2#, 5#  3/10 2#  3/10 2#   T-Band 90/90  L2 3/10  L3 3/10        3x10 L2  3/10 L2  3/10 L2   AROM Scaption  2# 3/10  4# 3/10  2# 3/10  2# 3/10  2# 3/10  3/10  3/10  3/10 1#   UBE: Retro  5'  5'  5'  5'  5'  5'  5'  5'   Scap Retraction 3x10  3/10  3/10      3/10  3/10  3/10   Scap Depression 3x10  3/10  3/10      3/10  3/10  3/10   Prone Retract with ER  2# 3/10          3x10 2#    3/10 2#   TB 45 degree Sh  Abd  L3 3/10  L4 3/10  L4 3/10  L4 3/10  L4 3/10  L2 2/10  L2 3/10  L3 3/10    TB ER  3/10  3x10  3/10  3/10  3/10  4x10 L4  3/10  3/10    Table PROM ER  3/10  3x10  3/10  3/10  3/10    3/10  3/10   Body Blade  4/20" 4 way  4/20" 4 way       4 way 4x20''  4 way 4/20"  3/10   OH ball wall dribble  30"/6  30"/6     30"x6  30"/6  30" x 6  30"/6         Modalities  2/28/19  3/5/19  1/29/19  1/31/19 2/5/19 2/12/19 2/14/19 2/19/19   P                   CP 10'  10'  10'  10'  10'  x10'  10'     Stim

## 2019-03-28 ENCOUNTER — TELEPHONE (OUTPATIENT)
Dept: OBGYN CLINIC | Facility: HOSPITAL | Age: 39
End: 2019-03-28

## 2019-03-28 NOTE — TELEPHONE ENCOUNTER
Patient's SO, Valdo Alicea, called in very angry that she has not heard anything back from Vikash Khalil on disability forms  She states that now the patient is not getting paid because his forms have not been received  She is asking to speak to someone about this     Call back number: [phone]

## 2019-03-29 ENCOUNTER — TELEPHONE (OUTPATIENT)
Dept: OBGYN CLINIC | Facility: CLINIC | Age: 39
End: 2019-03-29

## 2019-03-29 ENCOUNTER — TELEPHONE (OUTPATIENT)
Dept: OBGYN CLINIC | Facility: HOSPITAL | Age: 39
End: 2019-03-29

## 2019-04-30 NOTE — PROGRESS NOTES
Discharge Summary    Unless otherwise notified, Pt will be D/C from physical therapy at this time, as he has not reached out to schedule since 3/5/19

## 2019-05-12 ENCOUNTER — APPOINTMENT (EMERGENCY)
Dept: CT IMAGING | Facility: HOSPITAL | Age: 39
End: 2019-05-12
Payer: COMMERCIAL

## 2019-05-12 ENCOUNTER — HOSPITAL ENCOUNTER (EMERGENCY)
Facility: HOSPITAL | Age: 39
Discharge: HOME | End: 2019-05-12
Attending: EMERGENCY MEDICINE | Admitting: EMERGENCY MEDICINE
Payer: COMMERCIAL

## 2019-05-12 ENCOUNTER — APPOINTMENT (EMERGENCY)
Dept: ULTRASOUND IMAGING | Facility: HOSPITAL | Age: 39
End: 2019-05-12
Payer: COMMERCIAL

## 2019-05-12 ENCOUNTER — APPOINTMENT (EMERGENCY)
Dept: RADIOLOGY | Facility: HOSPITAL | Age: 39
End: 2019-05-12
Payer: COMMERCIAL

## 2019-05-12 VITALS
RESPIRATION RATE: 19 BRPM | DIASTOLIC BLOOD PRESSURE: 78 MMHG | WEIGHT: 315 LBS | OXYGEN SATURATION: 96 % | BODY MASS INDEX: 41.69 KG/M2 | HEART RATE: 57 BPM | SYSTOLIC BLOOD PRESSURE: 131 MMHG | TEMPERATURE: 97.9 F

## 2019-05-12 DIAGNOSIS — R00.2 PALPITATIONS: Primary | ICD-10-CM

## 2019-05-12 DIAGNOSIS — I48.91 ATRIAL FIBRILLATION WITH RVR (HCC): ICD-10-CM

## 2019-05-12 LAB
ALBUMIN SERPL BCP-MCNC: 3.6 G/DL (ref 3.5–5)
ALP SERPL-CCNC: 51 U/L (ref 46–116)
ALT SERPL W P-5'-P-CCNC: 30 U/L (ref 12–78)
ANION GAP SERPL CALCULATED.3IONS-SCNC: 9 MMOL/L (ref 4–13)
APTT PPP: 34 SECONDS (ref 26–38)
AST SERPL W P-5'-P-CCNC: 12 U/L (ref 5–45)
BASOPHILS # BLD AUTO: 0.08 THOUSANDS/ΜL (ref 0–0.1)
BASOPHILS NFR BLD AUTO: 1 % (ref 0–1)
BILIRUB SERPL-MCNC: 0.8 MG/DL (ref 0.2–1)
BUN SERPL-MCNC: 14 MG/DL (ref 5–25)
CALCIUM SERPL-MCNC: 8.7 MG/DL (ref 8.3–10.1)
CHLORIDE SERPL-SCNC: 104 MMOL/L (ref 100–108)
CO2 SERPL-SCNC: 26 MMOL/L (ref 21–32)
CREAT SERPL-MCNC: 1.23 MG/DL (ref 0.6–1.3)
EOSINOPHIL # BLD AUTO: 0.15 THOUSAND/ΜL (ref 0–0.61)
EOSINOPHIL NFR BLD AUTO: 2 % (ref 0–6)
ERYTHROCYTE [DISTWIDTH] IN BLOOD BY AUTOMATED COUNT: 12.7 % (ref 11.6–15.1)
GFR SERPL CREATININE-BSD FRML MDRD: 73 ML/MIN/1.73SQ M
GLUCOSE SERPL-MCNC: 99 MG/DL (ref 65–140)
HCT VFR BLD AUTO: 46.1 % (ref 36.5–49.3)
HGB BLD-MCNC: 15.7 G/DL (ref 12–17)
IMM GRANULOCYTES # BLD AUTO: 0.04 THOUSAND/UL (ref 0–0.2)
IMM GRANULOCYTES NFR BLD AUTO: 0 % (ref 0–2)
INR PPP: 1.05 (ref 0.86–1.17)
LYMPHOCYTES # BLD AUTO: 2.08 THOUSANDS/ΜL (ref 0.6–4.47)
LYMPHOCYTES NFR BLD AUTO: 22 % (ref 14–44)
MAGNESIUM SERPL-MCNC: 2.1 MG/DL (ref 1.6–2.6)
MCH RBC QN AUTO: 29.1 PG (ref 26.8–34.3)
MCHC RBC AUTO-ENTMCNC: 34.1 G/DL (ref 31.4–37.4)
MCV RBC AUTO: 85 FL (ref 82–98)
MONOCYTES # BLD AUTO: 0.65 THOUSAND/ΜL (ref 0.17–1.22)
MONOCYTES NFR BLD AUTO: 7 % (ref 4–12)
NEUTROPHILS # BLD AUTO: 6.43 THOUSANDS/ΜL (ref 1.85–7.62)
NEUTS SEG NFR BLD AUTO: 68 % (ref 43–75)
NRBC BLD AUTO-RTO: 0 /100 WBCS
PLATELET # BLD AUTO: 260 THOUSANDS/UL (ref 149–390)
PMV BLD AUTO: 10.5 FL (ref 8.9–12.7)
POTASSIUM SERPL-SCNC: 3.8 MMOL/L (ref 3.5–5.3)
PROT SERPL-MCNC: 6.7 G/DL (ref 6.4–8.2)
PROTHROMBIN TIME: 13.2 SECONDS (ref 11.8–14.2)
RBC # BLD AUTO: 5.4 MILLION/UL (ref 3.88–5.62)
SODIUM SERPL-SCNC: 139 MMOL/L (ref 136–145)
TROPONIN I SERPL-MCNC: <0.02 NG/ML
TSH SERPL DL<=0.05 MIU/L-ACNC: 1.58 UIU/ML (ref 0.36–3.74)
WBC # BLD AUTO: 9.43 THOUSAND/UL (ref 4.31–10.16)

## 2019-05-12 PROCEDURE — 99285 EMERGENCY DEPT VISIT HI MDM: CPT

## 2019-05-12 PROCEDURE — 93971 EXTREMITY STUDY: CPT

## 2019-05-12 PROCEDURE — 85730 THROMBOPLASTIN TIME PARTIAL: CPT | Performed by: EMERGENCY MEDICINE

## 2019-05-12 PROCEDURE — 80053 COMPREHEN METABOLIC PANEL: CPT | Performed by: EMERGENCY MEDICINE

## 2019-05-12 PROCEDURE — 71275 CT ANGIOGRAPHY CHEST: CPT

## 2019-05-12 PROCEDURE — 85610 PROTHROMBIN TIME: CPT | Performed by: EMERGENCY MEDICINE

## 2019-05-12 PROCEDURE — 83735 ASSAY OF MAGNESIUM: CPT | Performed by: EMERGENCY MEDICINE

## 2019-05-12 PROCEDURE — 84484 ASSAY OF TROPONIN QUANT: CPT | Performed by: EMERGENCY MEDICINE

## 2019-05-12 PROCEDURE — 93005 ELECTROCARDIOGRAM TRACING: CPT

## 2019-05-12 PROCEDURE — 99284 EMERGENCY DEPT VISIT MOD MDM: CPT | Performed by: EMERGENCY MEDICINE

## 2019-05-12 PROCEDURE — 71046 X-RAY EXAM CHEST 2 VIEWS: CPT

## 2019-05-12 PROCEDURE — 36415 COLL VENOUS BLD VENIPUNCTURE: CPT | Performed by: EMERGENCY MEDICINE

## 2019-05-12 PROCEDURE — 85025 COMPLETE CBC W/AUTO DIFF WBC: CPT | Performed by: EMERGENCY MEDICINE

## 2019-05-12 PROCEDURE — 84443 ASSAY THYROID STIM HORMONE: CPT | Performed by: EMERGENCY MEDICINE

## 2019-05-12 RX ORDER — ASPIRIN 81 MG/1
81 TABLET ORAL DAILY
Qty: 30 TABLET | Refills: 0 | Status: SHIPPED | OUTPATIENT
Start: 2019-05-12 | End: 2020-05-14 | Stop reason: ALTCHOICE

## 2019-05-12 RX ORDER — ASPIRIN 81 MG/1
81 TABLET, CHEWABLE ORAL ONCE
Status: COMPLETED | OUTPATIENT
Start: 2019-05-12 | End: 2019-05-12

## 2019-05-12 RX ADMIN — ASPIRIN 81 MG 81 MG: 81 TABLET ORAL at 15:47

## 2019-05-12 RX ADMIN — IOHEXOL 85 ML: 350 INJECTION, SOLUTION INTRAVENOUS at 14:38

## 2019-05-13 ENCOUNTER — OFFICE VISIT (OUTPATIENT)
Dept: INTERNAL MEDICINE CLINIC | Facility: CLINIC | Age: 39
End: 2019-05-13
Payer: COMMERCIAL

## 2019-05-13 VITALS
WEIGHT: 310.25 LBS | TEMPERATURE: 96.6 F | HEART RATE: 66 BPM | BODY MASS INDEX: 39.82 KG/M2 | DIASTOLIC BLOOD PRESSURE: 78 MMHG | HEIGHT: 74 IN | SYSTOLIC BLOOD PRESSURE: 130 MMHG | OXYGEN SATURATION: 97 %

## 2019-05-13 DIAGNOSIS — Q23.1 BICUSPID AORTIC VALVE: ICD-10-CM

## 2019-05-13 DIAGNOSIS — I10 ESSENTIAL HYPERTENSION: ICD-10-CM

## 2019-05-13 DIAGNOSIS — E66.01 MORBID OBESITY WITH BMI OF 40.0-44.9, ADULT (HCC): ICD-10-CM

## 2019-05-13 DIAGNOSIS — S43.431S SUPERIOR GLENOID LABRUM LESION OF RIGHT SHOULDER, SEQUELA: ICD-10-CM

## 2019-05-13 DIAGNOSIS — R00.2 HEART PALPITATIONS: Primary | ICD-10-CM

## 2019-05-13 LAB
ATRIAL RATE: 72 BPM
P AXIS: 26 DEGREES
PR INTERVAL: 146 MS
QRS AXIS: 9 DEGREES
QRSD INTERVAL: 102 MS
QT INTERVAL: 364 MS
QTC INTERVAL: 398 MS
T WAVE AXIS: -10 DEGREES
VENTRICULAR RATE: 72 BPM

## 2019-05-13 PROCEDURE — 99214 OFFICE O/P EST MOD 30 MIN: CPT | Performed by: INTERNAL MEDICINE

## 2019-05-13 PROCEDURE — 93010 ELECTROCARDIOGRAM REPORT: CPT | Performed by: INTERNAL MEDICINE

## 2019-05-13 PROCEDURE — 1036F TOBACCO NON-USER: CPT | Performed by: INTERNAL MEDICINE

## 2019-05-13 PROCEDURE — 93971 EXTREMITY STUDY: CPT | Performed by: SURGERY

## 2019-05-13 PROCEDURE — 3008F BODY MASS INDEX DOCD: CPT | Performed by: INTERNAL MEDICINE

## 2019-05-13 PROCEDURE — 3078F DIAST BP <80 MM HG: CPT | Performed by: INTERNAL MEDICINE

## 2019-05-13 PROCEDURE — 3075F SYST BP GE 130 - 139MM HG: CPT | Performed by: INTERNAL MEDICINE

## 2019-06-27 ENCOUNTER — TELEPHONE (OUTPATIENT)
Dept: INTERNAL MEDICINE CLINIC | Facility: CLINIC | Age: 39
End: 2019-06-27

## 2019-06-27 DIAGNOSIS — R91.1 INCIDENTAL LUNG NODULE, GREATER THAN OR EQUAL TO 8MM: Primary | ICD-10-CM

## 2019-08-12 ENCOUNTER — HOSPITAL ENCOUNTER (OUTPATIENT)
Dept: CT IMAGING | Facility: HOSPITAL | Age: 39
Discharge: HOME | End: 2019-08-12
Attending: INTERNAL MEDICINE
Payer: COMMERCIAL

## 2019-08-12 DIAGNOSIS — R91.1 INCIDENTAL LUNG NODULE, GREATER THAN OR EQUAL TO 8MM: ICD-10-CM

## 2019-08-12 PROCEDURE — 71250 CT THORAX DX C-: CPT

## 2019-09-12 DIAGNOSIS — I10 ESSENTIAL HYPERTENSION: ICD-10-CM

## 2019-09-12 RX ORDER — LISINOPRIL AND HYDROCHLOROTHIAZIDE 12.5; 1 MG/1; MG/1
1 TABLET ORAL DAILY
Qty: 30 TABLET | Refills: 5 | Status: SHIPPED | OUTPATIENT
Start: 2019-09-12 | End: 2020-05-01 | Stop reason: SDUPTHER

## 2019-10-23 ENCOUNTER — OFFICE VISIT (OUTPATIENT)
Dept: INTERNAL MEDICINE CLINIC | Facility: CLINIC | Age: 39
End: 2019-10-23
Payer: COMMERCIAL

## 2019-10-23 VITALS
HEIGHT: 74 IN | OXYGEN SATURATION: 97 % | TEMPERATURE: 98.7 F | DIASTOLIC BLOOD PRESSURE: 76 MMHG | BODY MASS INDEX: 39.56 KG/M2 | WEIGHT: 308.25 LBS | HEART RATE: 62 BPM | SYSTOLIC BLOOD PRESSURE: 130 MMHG

## 2019-10-23 DIAGNOSIS — I10 HYPERTENSION, UNSPECIFIED TYPE: Primary | ICD-10-CM

## 2019-10-23 DIAGNOSIS — E78.5 HYPERLIPIDEMIA, UNSPECIFIED HYPERLIPIDEMIA TYPE: ICD-10-CM

## 2019-10-23 DIAGNOSIS — Z98.890 S/P SHOULDER SURGERY: ICD-10-CM

## 2019-10-23 DIAGNOSIS — Z23 FLU VACCINE NEED: ICD-10-CM

## 2019-10-23 PROBLEM — I48.0 PAF (PAROXYSMAL ATRIAL FIBRILLATION) (HCC): Status: ACTIVE | Noted: 2019-05-20

## 2019-10-23 PROCEDURE — 90471 IMMUNIZATION ADMIN: CPT

## 2019-10-23 PROCEDURE — 99214 OFFICE O/P EST MOD 30 MIN: CPT | Performed by: INTERNAL MEDICINE

## 2019-10-23 PROCEDURE — 90686 IIV4 VACC NO PRSV 0.5 ML IM: CPT

## 2019-10-23 PROCEDURE — 3078F DIAST BP <80 MM HG: CPT | Performed by: INTERNAL MEDICINE

## 2019-10-23 PROCEDURE — 3008F BODY MASS INDEX DOCD: CPT | Performed by: INTERNAL MEDICINE

## 2019-10-23 PROCEDURE — 3075F SYST BP GE 130 - 139MM HG: CPT | Performed by: INTERNAL MEDICINE

## 2019-10-23 RX ORDER — DILTIAZEM HYDROCHLORIDE 180 MG/1
180 CAPSULE, COATED, EXTENDED RELEASE ORAL DAILY
COMMUNITY
Start: 2019-09-12 | End: 2022-07-28 | Stop reason: ALTCHOICE

## 2019-10-23 NOTE — PROGRESS NOTES
Assessment/Plan:         Diagnoses and all orders for this visit:    Hypertension, unspecified type  -     Comprehensive metabolic panel; Future  Exercise encouraged and patient hopes to fit in schedule  Continue present rx  Increase water intake, weight loss, low sodium diet    Flu vaccine need  -     influenza vaccine, 4536-1220, quadrivalent, 0 5 mL, preservative-free, for adult and pediatric patients 6 mos+ (AFLURIA, FLUARIX, FLULAVAL, FLUZONE)  Flu shot today  Hyperlipidemia, unspecified hyperlipidemia type  -     Lipid panel; Future  Low fat diet Rx for fbw    S/P shoulder surgery  Pt still not at improved rom and still has pain - he has not seen ortho in sometime so encouraged patient to setup followup visit and do home exercises daily    Other orders  -     diltiazem (CARDIZEM CD) 180 mg 24 hr capsule; Take 180 mg by mouth daily  Rto 6 months         Patient ID: Yosef Whipple is a 44 y o  male  HPI   Pt doing ok He is back to work fulltime but still has not regained range in his shoulder and does have pain at times he has not been back to ortho in recent weeks He is disappointed with the range - it will be a year soon since surgery completed  No chest pain or sob occasional headache relieved with Tylenol  He has not been finding time to exercise but hopes to fit in again as he felt better doing that 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  He would like flu shot today and needs well form completed for his insurance to be lowered  Review of Systems   Constitutional: Negative  HENT: Negative  Respiratory: Negative  Cardiovascular: Negative  Gastrointestinal: Negative  Genitourinary: Negative  Musculoskeletal: Negative  Skin: Negative  Neurological: Negative  Hematological: Negative  Psychiatric/Behavioral: Negative          Past Medical History:   Diagnosis Date    Aortic valve cusp abnormality     "bicuspid aortic valve"    Hypertension Past Surgical History:   Procedure Laterality Date    ANTERIOR CRUCIATE LIGAMENT REPAIR Right     KNEE ARTHROSCOPY Right     OTHER SURGICAL HISTORY      SAE    ND SHLDR ARTHROSCOP,SURG,REPAIR,SLAP LESION Left 5/26/2016    Procedure: SHOULDER ARTHROSCOPY SLAP REPAIR;  Surgeon: David Jeffrey MD;  Location: [facility];  Service: Orthopedics    ND SHLDR ARTHROSCOP,SURG,REPAIR,SLAP LESION Right 11/26/2018    Procedure: Shoulder Arthroscopy with labral repair and biceps tenotomy;  Surgeon: David Jeffrey MD;  Location: [facility];  Service: Orthopedics    ROTATOR CUFF REPAIR Left     VASECTOMY       Social History     Socioeconomic History    Marital status:       Spouse name: Not on file    Number of children: Not on file    Years of education: Not on file    Highest education level: Not on file   Occupational History    Not on file   Social Needs    Financial resource strain: Not on file    Food insecurity:     Worry: Not on file     Inability: Not on file    Transportation needs:     Medical: Not on file     Non-medical: Not on file   Tobacco Use    Smoking status: Never Smoker    Smokeless tobacco: Current User   Substance and Sexual Activity    Alcohol use: Yes     Comment: rare    Drug use: No    Sexual activity: Yes   Lifestyle    Physical activity:     Days per week: Not on file     Minutes per session: Not on file    Stress: Not on file   Relationships    Social connections:     Talks on phone: Not on file     Gets together: Not on file     Attends [employer] service: Not on file     Active member of club or organization: Not on file     Attends meetings of clubs or organizations: Not on file     Relationship status: Not on file    Intimate partner violence:     Fear of current or ex partner: Not on file     Emotionally abused: Not on file     Physically abused: Not on file     Forced sexual activity: Not on file   Other Topics Concern    Not on file   Social History Narrative Always uses seat belt     Caffeine Use     Dental care regularly           Allergies   Allergen Reactions    Penicillins Hives             /76   Pulse 62   Temp 98 7 °F (37 1 °C) (Tympanic)   Ht 6' 1 5" (1 867 m)   Wt (!) 140 kg (308 lb 4 oz)   SpO2 97%   BMI 40 12 kg/m²          Physical Exam   Constitutional: He is oriented to person, place, and time  He appears well-developed and well-nourished  No distress  HENT:   Head: Normocephalic and atraumatic  Mouth/Throat: Oropharynx is clear and moist  No oropharyngeal exudate  Eyes: Pupils are equal, round, and reactive to light  Conjunctivae and EOM are normal  No scleral icterus  Neck: Normal range of motion  Neck supple  No JVD present  Cardiovascular: Normal rate and regular rhythm  No murmur heard  Pulmonary/Chest: Effort normal and breath sounds normal  No respiratory distress  He has no wheezes  Abdominal: Soft  Bowel sounds are normal  He exhibits no distension  There is no tenderness  Musculoskeletal: Normal range of motion  He exhibits no edema  Lymphadenopathy:     He has no cervical adenopathy  Neurological: He is alert and oriented to person, place, and time  He displays normal reflexes  No cranial nerve deficit  He exhibits normal muscle tone  Coordination normal    Skin: Skin is warm and dry  Capillary refill takes less than 2 seconds  He is not diaphoretic  Psychiatric: He has a normal mood and affect  His behavior is normal  Judgment and thought content normal    Nursing note and vitals reviewed

## 2019-12-07 ENCOUNTER — HOSPITAL ENCOUNTER (EMERGENCY)
Facility: HOSPITAL | Age: 39
Discharge: HOME | End: 2019-12-07
Attending: EMERGENCY MEDICINE
Payer: COMMERCIAL

## 2019-12-07 ENCOUNTER — APPOINTMENT (EMERGENCY)
Dept: RADIOLOGY | Facility: HOSPITAL | Age: 39
End: 2019-12-07
Payer: COMMERCIAL

## 2019-12-07 ENCOUNTER — APPOINTMENT (EMERGENCY)
Dept: CT IMAGING | Facility: HOSPITAL | Age: 39
End: 2019-12-07
Payer: COMMERCIAL

## 2019-12-07 VITALS
DIASTOLIC BLOOD PRESSURE: 51 MMHG | HEIGHT: 73 IN | RESPIRATION RATE: 18 BRPM | BODY MASS INDEX: 41.75 KG/M2 | SYSTOLIC BLOOD PRESSURE: 112 MMHG | WEIGHT: 315 LBS | TEMPERATURE: 100.5 F | HEART RATE: 88 BPM | OXYGEN SATURATION: 97 %
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
## 2019-12-07 DIAGNOSIS — M54.50 LUMBAR BACK PAIN: ICD-10-CM

## 2019-12-07 DIAGNOSIS — S20.319A CHEST ABRASION: ICD-10-CM

## 2019-12-07 LAB
ANION GAP SERPL CALCULATED.3IONS-SCNC: 5 MMOL/L (ref 4–13)
BASOPHILS # BLD AUTO: 0.08 THOUSANDS/ΜL (ref 0–0.1)
BASOPHILS NFR BLD AUTO: 1 % (ref 0–1)
BUN SERPL-MCNC: 11 MG/DL (ref 5–25)
CALCIUM SERPL-MCNC: 8.6 MG/DL (ref 8.3–10.1)
CHLORIDE SERPL-SCNC: 103 MMOL/L (ref 100–108)
CO2 SERPL-SCNC: 29 MMOL/L (ref 21–32)
CREAT SERPL-MCNC: 1.18 MG/DL (ref 0.6–1.3)
EOSINOPHIL # BLD AUTO: 0.07 THOUSAND/ΜL (ref 0–0.61)
EOSINOPHIL NFR BLD AUTO: 1 % (ref 0–6)
ERYTHROCYTE [DISTWIDTH] IN BLOOD BY AUTOMATED COUNT: 12.6 % (ref 11.6–15.1)
GFR SERPL CREATININE-BSD FRML MDRD: 77 ML/MIN/1.73SQ M
GLUCOSE SERPL-MCNC: 92 MG/DL (ref 65–140)
HCT VFR BLD AUTO: 42.9 % (ref 36.5–49.3)
HGB BLD-MCNC: 14.1 G/DL (ref 12–17)
IMM GRANULOCYTES # BLD AUTO: 0.05 THOUSAND/UL (ref 0–0.2)
IMM GRANULOCYTES NFR BLD AUTO: 0 % (ref 0–2)
LYMPHOCYTES # BLD AUTO: 1.19 THOUSANDS/ΜL (ref 0.6–4.47)
LYMPHOCYTES NFR BLD AUTO: 9 % (ref 14–44)
MCH RBC QN AUTO: 28.8 PG (ref 26.8–34.3)
MCHC RBC AUTO-ENTMCNC: 32.9 G/DL (ref 31.4–37.4)
MCV RBC AUTO: 88 FL (ref 82–98)
MONOCYTES # BLD AUTO: 0.91 THOUSAND/ΜL (ref 0.17–1.22)
MONOCYTES NFR BLD AUTO: 7 % (ref 4–12)
NEUTROPHILS # BLD AUTO: 11.5 THOUSANDS/ΜL (ref 1.85–7.62)
NEUTS SEG NFR BLD AUTO: 82 % (ref 43–75)
NRBC BLD AUTO-RTO: 0 /100 WBCS
PLATELET # BLD AUTO: 211 THOUSANDS/UL (ref 149–390)
PMV BLD AUTO: 10.8 FL (ref 8.9–12.7)
POTASSIUM SERPL-SCNC: 3.9 MMOL/L (ref 3.5–5.3)
RBC # BLD AUTO: 4.9 MILLION/UL (ref 3.88–5.62)
SODIUM SERPL-SCNC: 137 MMOL/L (ref 136–145)
TROPONIN I SERPL-MCNC: <0.02 NG/ML
WBC # BLD AUTO: 13.8 THOUSAND/UL (ref 4.31–10.16)

## 2019-12-07 PROCEDURE — 70450 CT HEAD/BRAIN W/O DYE: CPT

## 2019-12-07 PROCEDURE — 80048 BASIC METABOLIC PNL TOTAL CA: CPT | Performed by: EMERGENCY MEDICINE

## 2019-12-07 PROCEDURE — 84484 ASSAY OF TROPONIN QUANT: CPT | Performed by: EMERGENCY MEDICINE

## 2019-12-07 PROCEDURE — 71045 X-RAY EXAM CHEST 1 VIEW: CPT

## 2019-12-07 PROCEDURE — 99285 EMERGENCY DEPT VISIT HI MDM: CPT

## 2019-12-07 PROCEDURE — 71260 CT THORAX DX C+: CPT

## 2019-12-07 PROCEDURE — 36415 COLL VENOUS BLD VENIPUNCTURE: CPT | Performed by: EMERGENCY MEDICINE

## 2019-12-07 PROCEDURE — 93005 ELECTROCARDIOGRAM TRACING: CPT

## 2019-12-07 PROCEDURE — 74177 CT ABD & PELVIS W/CONTRAST: CPT

## 2019-12-07 PROCEDURE — 85025 COMPLETE CBC W/AUTO DIFF WBC: CPT | Performed by: EMERGENCY MEDICINE

## 2019-12-07 PROCEDURE — 99284 EMERGENCY DEPT VISIT MOD MDM: CPT | Performed by: EMERGENCY MEDICINE

## 2019-12-07 RX ORDER — IBUPROFEN 600 MG/1
600 TABLET ORAL ONCE
Status: COMPLETED | OUTPATIENT
Start: 2019-12-07 | End: 2019-12-07

## 2019-12-07 RX ORDER — ACETAMINOPHEN 325 MG/1
650 TABLET ORAL ONCE
Status: COMPLETED | OUTPATIENT
Start: 2019-12-07 | End: 2019-12-07

## 2019-12-07 RX ADMIN — IOHEXOL 100 ML: 350 INJECTION, SOLUTION INTRAVENOUS at 19:06

## 2019-12-07 RX ADMIN — ACETAMINOPHEN 650 MG: 325 TABLET, FILM COATED ORAL at 20:44

## 2019-12-07 RX ADMIN — IBUPROFEN 600 MG: 600 TABLET, FILM COATED ORAL at 20:44

## 2019-12-08 NOTE — DISCHARGE INSTRUCTIONS
Please follow-up with the primary care provider, anything changes or worsens, please return emergency department  This CT of the head is unremarkable as was a CT of her chest and of her abdomen pelvis including looking her back  Your EKG was unchanged from previous and your cardiac enzymes were normal   Again if anything changes or worsens, please return emergency department
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
## 2019-12-09 LAB
ATRIAL RATE: 92 BPM
P AXIS: 12 DEGREES
PR INTERVAL: 150 MS
QRS AXIS: 3 DEGREES
QRSD INTERVAL: 94 MS
QT INTERVAL: 348 MS
QTC INTERVAL: 430 MS
T WAVE AXIS: -17 DEGREES
VENTRICULAR RATE: 92 BPM

## 2019-12-09 PROCEDURE — 93010 ELECTROCARDIOGRAM REPORT: CPT | Performed by: INTERNAL MEDICINE

## 2019-12-26 DIAGNOSIS — R68.89 FLU-LIKE SYMPTOMS: Primary | ICD-10-CM

## 2019-12-26 RX ORDER — OSELTAMIVIR PHOSPHATE 75 MG/1
75 CAPSULE ORAL 2 TIMES DAILY
Qty: 10 CAPSULE | Refills: 0 | Status: SHIPPED | OUTPATIENT
Start: 2019-12-26 | End: 2019-12-31

## 2019-12-26 NOTE — TELEPHONE ENCOUNTER
Tanja Long states that the pt is c/o jony, and body aches  Pts son has the flu  Pls advise              All- penicillins

## 2020-03-12 ENCOUNTER — HOSPITAL ENCOUNTER (EMERGENCY)
Facility: HOSPITAL | Age: 40
Discharge: HOME | End: 2020-03-12
Attending: EMERGENCY MEDICINE | Admitting: EMERGENCY MEDICINE
Payer: COMMERCIAL

## 2020-03-12 VITALS
RESPIRATION RATE: 18 BRPM | BODY MASS INDEX: 41.75 KG/M2 | OXYGEN SATURATION: 96 % | HEIGHT: 73 IN | WEIGHT: 315 LBS | DIASTOLIC BLOOD PRESSURE: 106 MMHG | HEART RATE: 61 BPM | SYSTOLIC BLOOD PRESSURE: 159 MMHG | TEMPERATURE: 97.5 F

## 2020-03-12 DIAGNOSIS — Z13.9 ENCOUNTER FOR MEDICAL SCREENING EXAMINATION: Primary | ICD-10-CM

## 2020-03-12 PROCEDURE — NC001 PR NO CHARGE: Performed by: PHYSICIAN ASSISTANT

## 2020-03-12 PROCEDURE — 99284 EMERGENCY DEPT VISIT MOD MDM: CPT

## 2020-03-12 PROCEDURE — 99281 EMR DPT VST MAYX REQ PHY/QHP: CPT | Performed by: EMERGENCY MEDICINE

## 2020-03-12 NOTE — DISCHARGE INSTRUCTIONS
At this time I spoke with the [facility] and [facility]  Because you did not have direct contact with the person who was sick  Also due to the fact that the other gentleman you were sitting next to and the other 2 students were exposed  are asymptomatic there is no need for testing at this time  If your place of employment is concerned they can always quarantine you at home for 2 weeks      Please return with any symptoms

## 2020-03-12 NOTE — ED PROVIDER NOTES
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 was diagnosed positive and admitted to a hospital   The officer that sat next to our patient was asymptomatic  So our patient had an exposure to a person that was exposed to Covid  He was told by his department to come into the hospital for testing  Does not have any fever chills no headache no stuffy nose no sneezing no cough or any symptoms whatsoever  I did call our Infectious Disease doctor  Dr Romulo Crocker  Who agrees with the isolation negative pressure room that we already started and to call the board of Health for further advice whether he he needs testing  She does not feel that the RP 2 test is necessary or useful at this time because patient is showing no symptoms  On hold with the state currently          Prior to Admission Medications   Prescriptions Last Dose Informant Patient Reported?  Taking?   aspirin (ECOTRIN LOW STRENGTH) 81 mg EC tablet Not Taking at Unknown time  No No   Sig: Take 1 tablet (81 mg total) by mouth daily   Patient not taking: Reported on 10/23/2019   diltiazem (CARDIZEM CD) 180 mg 24 hr capsule   Yes Yes   Sig: Take 180 mg by mouth daily   lisinopril-hydrochlorothiazide (PRINZIDE,ZESTORETIC) 10-12 5 MG per tablet   No Yes   Sig: Take 1 tablet by mouth daily      Facility-Administered Medications: None       Past Medical History:   Diagnosis Date    Aortic valve cusp abnormality     "bicuspid aortic valve"    Hypertension        Past Surgical History:   Procedure Laterality Date    ANTERIOR CRUCIATE LIGAMENT REPAIR Right     KNEE ARTHROSCOPY Right     OTHER SURGICAL HISTORY      SAE    FL SHLDR ARTHROSCOP,SURG,REPAIR,SLAP LESION Left 5/26/2016    Procedure: SHOULDER ARTHROSCOPY SLAP REPAIR;  Surgeon: Cinthia Faye MD;  Location: [facility];  Service: Orthopedics    FL SHLDR ARTHROSCOP,SURG,REPAIR,SLAP LESION Right 11/26/2018    Procedure: Shoulder Arthroscopy with labral repair and biceps tenotomy;  Surgeon: Cinthia Faye MD;  [facility]: [facility];  Service: Orthopedics    ROTATOR CUFF REPAIR Left     VASECTOMY         Family History   Problem Relation Age of Onset    Hypertension Father     Lung cancer Maternal Grandfather     Other Paternal Grandmother         Carotid Stenosis     Hyperlipidemia Paternal Grandmother     Hypertension Paternal Grandmother     Stroke Paternal Grandmother     Coronary artery disease Paternal Grandfather     Hyperlipidemia Paternal Grandfather     Hypertension Paternal Grandfather     Diabetes type II Paternal Grandfather      I have reviewed and agree with the history as documented      E-Cigarette/Vaping    E-Cigarette Use Never User      E-Cigarette/Vaping Substances     Social History     Tobacco Use    Smoking status: Never Smoker    Smokeless tobacco: Current User   Substance Use Topics    Alcohol use: Yes     Comment: rare    Drug use: No       Review of Systems    Physical Exam  Physical Exam    Vital Signs  ED Triage Vitals [03/12/20 1053]   Temperature Pulse Respirations Blood Pressure SpO2   97 5 °F (36 4 °C) 61 18 (!) 159/106 96 %      Temp Source Heart Rate Source Patient Position - Orthostatic VS BP Location FiO2 (%)   Oral Monitor -- Right arm --      Pain Score       --           Vitals:    03/12/20 1053   BP: (!) 159/106   Pulse: 61         Visual Acuity      ED Medications  Medications - No data to display    Diagnostic Studies  Results Reviewed     None                 No orders to display              Procedures  Procedures         ED Course MDM  Number of Diagnoses or Management Options  Diagnosis management comments: This is a 43-year-old male patient who was at a class 4 days ago and 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 that 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 corona virus  These 3 police officers did not have symptoms  I call the Department of Health who referred me to the [facility] for [address] spoke with Aurora Mora at the [facility] who stated that he does not require testing because he did not have direct contact to the source patient  I was instructed to discharge the patient home  Disposition  Final diagnoses:   Encounter for medical screening examination     Time reflects when diagnosis was documented in both MDM as applicable and the Disposition within this note     Time User Action Codes Description Comment    3/12/2020 11:51 AM Lucas Segura Add [Z13 9] Encounter for medical screening examination       ED Disposition     ED Disposition Condition Date/Time Comment    Discharge Stable Thu Mar 12, 2020 11:51 AM Blaze Samaniego discharge to home/self care  Follow-up Information     Follow up With Specialties Details Why Contact Info    Yu Isbell,  Internal Medicine   [address] [place] [address] [address]  [address]  [phone]            Patient's Medications   Discharge Prescriptions    No medications on file     No discharge procedures on file      PDMP Review     None          ED Provider  Electronically Signed by           Jaime Cameron PA-C  03/12/20 8155

## 2020-03-31 NOTE — ED PROVIDER NOTES
History  Chief Complaint   Patient presents with    Isolation Infectious Disease     Was exposed to individual at a class who were also exposed to individual tested to corona virus  Patient presents for evaluation of possible COVID exposure      History provided by:  Patient      Prior to Admission Medications   Prescriptions Last Dose Informant Patient Reported?  Taking?   aspirin (ECOTRIN LOW STRENGTH) 81 mg EC tablet Not Taking at Unknown time  No No   Sig: Take 1 tablet (81 mg total) by mouth daily   Patient not taking: Reported on 10/23/2019   diltiazem (CARDIZEM CD) 180 mg 24 hr capsule   Yes Yes   Sig: Take 180 mg by mouth daily   lisinopril-hydrochlorothiazide (PRINZIDE,ZESTORETIC) 10-12 5 MG per tablet   No Yes   Sig: Take 1 tablet by mouth daily      Facility-Administered Medications: None       Past Medical History:   Diagnosis Date    Aortic valve cusp abnormality     "bicuspid aortic valve"    Hypertension        Past Surgical History:   Procedure Laterality Date    ANTERIOR CRUCIATE LIGAMENT REPAIR Right     KNEE ARTHROSCOPY Right     OTHER SURGICAL HISTORY      SAE    [place] SHLDR ARTHROSCOP,SURG,REPAIR,SLAP LESION Left 5/26/2016    Procedure: SHOULDER ARTHROSCOPY SLAP REPAIR;  Surgeon: Jarod Moscoso MD;  [facility];  Service: Orthopedics    [place] SHLDR ARTHROSCOP,SURG,REPAIR,SLAP LESION Right 11/26/2018    Procedure: Shoulder Arthroscopy with labral repair and biceps tenotomy;  Surgeon: Jarod Moscoso MD;  [facility];  Service: Orthopedics    ROTATOR CUFF REPAIR Left     VASECTOMY         Family History   Problem Relation Age of Onset    Hypertension Father     Lung cancer Maternal Grandfather     Other Paternal Grandmother         Carotid Stenosis     Hyperlipidemia Paternal Grandmother     Hypertension Paternal Grandmother     Stroke Paternal Grandmother     Coronary artery disease Paternal Grandfather     Hyperlipidemia Paternal Grandfather     Hypertension Paternal Grandfather     Diabetes type II Paternal Grandfather      I have reviewed and agree with the history as documented  E-Cigarette/Vaping    E-Cigarette Use Never User      E-Cigarette/Vaping Substances     Social History     Tobacco Use    Smoking status: Never Smoker    Smokeless tobacco: Current User   Substance Use Topics    Alcohol use: Yes     Comment: rare    Drug use: No       Review of Systems   Eyes: Negative  Respiratory: Negative  All other systems reviewed and are negative  Physical Exam  Physical Exam   Constitutional: He appears well-developed and well-nourished  HENT:   Head: Normocephalic  Right Ear: External ear normal    Left Ear: External ear normal    Eyes: Pupils are equal, round, and reactive to light  Cardiovascular: Normal rate and regular rhythm  Pulmonary/Chest: Effort normal    Abdominal: Soft  Musculoskeletal: Normal range of motion  Neurological: He is alert  Skin: Skin is warm  Capillary refill takes less than 2 seconds  Psychiatric: He has a normal mood and affect  His behavior is normal    Vitals reviewed        Vital Signs  ED Triage Vitals [03/12/20 1053]   Temperature Pulse Respirations Blood Pressure SpO2   97 5 °F (36 4 °C) 61 18 (!) 159/106 96 %      Temp Source Heart Rate Source Patient Position - Orthostatic VS BP Location FiO2 (%)   Oral Monitor -- Right arm --      Pain Score       --           Vitals:    03/12/20 1053   BP: (!) 159/106   Pulse: 61         Visual Acuity      ED Medications  Medications - No data to display    Diagnostic Studies  Results Reviewed     None                 No orders to display              Procedures  Procedures         ED Course                                 MDM      Disposition  Final diagnoses:   Encounter for medical screening examination     Time reflects when diagnosis was documented in both MDM as applicable and the Disposition within this note     Time User Action Codes Description Comment 3/12/2020 11:51 AM Lucas Segura [Z13 [id number]] Encounter for medical screening examination       ED Disposition     ED Disposition Condition Date/Time Comment    Discharge Stable Thu Mar 12, 2020 11:51 AM Dufm Minor discharge to home/self care  Follow-up Information     Follow up With Specialties Details Why [address] [facility], DO Internal Medicine   [address] [address] [address] [facility] [address]  [address]  [phone]            Discharge Medication List as of 3/12/2020 11:56 AM      CONTINUE these medications which have NOT CHANGED    Details   diltiazem (CARDIZEM CD) 180 mg 24 hr capsule Take 180 mg by mouth daily, Starting u 9/12/2019, Until Fri 9/11/2020, Historical Med      lisinopril-hydrochlorothiazide (PRINZIDE,ZESTORETIC) 10-12 5 MG per tablet Take 1 tablet by mouth daily, Starting Thu 9/12/2019, Normal      aspirin (ECOTRIN LOW STRENGTH) 81 mg EC tablet Take 1 tablet (81 mg total) by mouth daily, Starting Sun 5/12/2019, Print           No discharge procedures on file      PDMP Review     None          ED Provider  Electronically Signed by           Rao Mercado DO  03/30/20 5367

## 2020-04-24 ENCOUNTER — TELEMEDICINE (OUTPATIENT)
Dept: INTERNAL MEDICINE CLINIC | Facility: CLINIC | Age: 40
End: 2020-04-24
Payer: COMMERCIAL

## 2020-04-24 VITALS — BODY MASS INDEX: 40.42 KG/M2 | WEIGHT: 305 LBS | HEIGHT: 73 IN

## 2020-04-24 DIAGNOSIS — Z00.00 ANNUAL PHYSICAL EXAM: Primary | ICD-10-CM

## 2020-04-24 DIAGNOSIS — I10 ESSENTIAL HYPERTENSION: ICD-10-CM

## 2020-04-24 DIAGNOSIS — E78.2 MIXED HYPERLIPIDEMIA: ICD-10-CM

## 2020-04-24 DIAGNOSIS — Z98.890 S/P SHOULDER SURGERY: ICD-10-CM

## 2020-04-24 DIAGNOSIS — S43.431S SUPERIOR GLENOID LABRUM LESION OF RIGHT SHOULDER, SEQUELA: ICD-10-CM

## 2020-04-24 PROCEDURE — 99213 OFFICE O/P EST LOW 20 MIN: CPT | Performed by: INTERNAL MEDICINE

## 2020-04-27 ENCOUNTER — TELEPHONE (OUTPATIENT)
Dept: INTERNAL MEDICINE CLINIC | Facility: CLINIC | Age: 40
End: 2020-04-27

## 2020-04-29 ENCOUNTER — TELEPHONE (OUTPATIENT)
Dept: INTERNAL MEDICINE CLINIC | Facility: CLINIC | Age: 40
End: 2020-04-29

## 2020-05-01 DIAGNOSIS — I10 ESSENTIAL HYPERTENSION: ICD-10-CM

## 2020-05-04 DIAGNOSIS — I10 ESSENTIAL HYPERTENSION: ICD-10-CM

## 2020-05-04 RX ORDER — LISINOPRIL AND HYDROCHLOROTHIAZIDE 12.5; 1 MG/1; MG/1
TABLET ORAL
Qty: 30 TABLET | Refills: 0 | Status: SHIPPED | OUTPATIENT
Start: 2020-05-04 | End: 2020-05-14 | Stop reason: ALTCHOICE

## 2020-05-04 RX ORDER — LISINOPRIL AND HYDROCHLOROTHIAZIDE 12.5; 1 MG/1; MG/1
1 TABLET ORAL DAILY
Qty: 30 TABLET | Refills: 0 | Status: SHIPPED | OUTPATIENT
Start: 2020-05-04 | End: 2020-05-04

## 2020-05-06 ENCOUNTER — CLINICAL SUPPORT (OUTPATIENT)
Dept: INTERNAL MEDICINE CLINIC | Facility: CLINIC | Age: 40
End: 2020-05-06

## 2020-05-06 VITALS — DIASTOLIC BLOOD PRESSURE: 94 MMHG | SYSTOLIC BLOOD PRESSURE: 148 MMHG

## 2020-05-06 DIAGNOSIS — I10 ESSENTIAL HYPERTENSION: Primary | ICD-10-CM

## 2020-05-14 ENCOUNTER — OFFICE VISIT (OUTPATIENT)
Dept: INTERNAL MEDICINE CLINIC | Facility: CLINIC | Age: 40
End: 2020-05-14
Payer: COMMERCIAL

## 2020-05-14 VITALS
BODY MASS INDEX: 40.82 KG/M2 | OXYGEN SATURATION: 97 % | WEIGHT: 308 LBS | SYSTOLIC BLOOD PRESSURE: 146 MMHG | TEMPERATURE: 100.6 F | HEART RATE: 85 BPM | DIASTOLIC BLOOD PRESSURE: 78 MMHG | HEIGHT: 73 IN

## 2020-05-14 DIAGNOSIS — Z00.00 ANNUAL PHYSICAL EXAM: ICD-10-CM

## 2020-05-14 DIAGNOSIS — I10 ESSENTIAL HYPERTENSION: ICD-10-CM

## 2020-05-14 DIAGNOSIS — Z00.00 VISIT FOR PREVENTIVE HEALTH EXAMINATION: Primary | ICD-10-CM

## 2020-05-14 DIAGNOSIS — Q23.1 BICUSPID AORTIC VALVE: ICD-10-CM

## 2020-05-14 DIAGNOSIS — I48.0 PAF (PAROXYSMAL ATRIAL FIBRILLATION) (HCC): ICD-10-CM

## 2020-05-14 PROCEDURE — 99396 PREV VISIT EST AGE 40-64: CPT | Performed by: INTERNAL MEDICINE

## 2020-05-14 RX ORDER — LISINOPRIL AND HYDROCHLOROTHIAZIDE 20; 12.5 MG/1; MG/1
2 TABLET ORAL DAILY
COMMUNITY
Start: 2020-05-12

## 2020-05-14 RX ORDER — POTASSIUM CHLORIDE 20 MEQ/1
20 TABLET, EXTENDED RELEASE ORAL DAILY
COMMUNITY
Start: 2020-05-12 | End: 2022-05-17

## 2020-05-18 ENCOUNTER — APPOINTMENT (OUTPATIENT)
Dept: LAB | Facility: MEDICAL CENTER | Age: 40
End: 2020-05-18
Payer: COMMERCIAL

## 2020-05-18 DIAGNOSIS — I10 HYPERTENSION, UNSPECIFIED TYPE: ICD-10-CM

## 2020-05-18 DIAGNOSIS — I10 ESSENTIAL HYPERTENSION: ICD-10-CM

## 2020-05-18 DIAGNOSIS — E78.5 HYPERLIPIDEMIA, UNSPECIFIED HYPERLIPIDEMIA TYPE: ICD-10-CM

## 2020-05-18 DIAGNOSIS — E78.2 MIXED HYPERLIPIDEMIA: ICD-10-CM

## 2020-05-18 LAB
ALBUMIN SERPL BCP-MCNC: 4.2 G/DL (ref 3.5–5)
ALP SERPL-CCNC: 58 U/L (ref 46–116)
ALT SERPL W P-5'-P-CCNC: 27 U/L (ref 12–78)
ANION GAP SERPL CALCULATED.3IONS-SCNC: 7 MMOL/L (ref 4–13)
AST SERPL W P-5'-P-CCNC: 12 U/L (ref 5–45)
BILIRUB SERPL-MCNC: 0.53 MG/DL (ref 0.2–1)
BUN SERPL-MCNC: 18 MG/DL (ref 5–25)
CALCIUM SERPL-MCNC: 9 MG/DL (ref 8.3–10.1)
CHLORIDE SERPL-SCNC: 102 MMOL/L (ref 100–108)
CHOLEST SERPL-MCNC: 157 MG/DL (ref 50–200)
CO2 SERPL-SCNC: 26 MMOL/L (ref 21–32)
CREAT SERPL-MCNC: 1.07 MG/DL (ref 0.6–1.3)
GFR SERPL CREATININE-BSD FRML MDRD: 86 ML/MIN/1.73SQ M
GLUCOSE P FAST SERPL-MCNC: 97 MG/DL (ref 65–99)
HDLC SERPL-MCNC: 41 MG/DL
LDLC SERPL CALC-MCNC: 97 MG/DL (ref 0–100)
NONHDLC SERPL-MCNC: 116 MG/DL
POTASSIUM SERPL-SCNC: 3.8 MMOL/L (ref 3.5–5.3)
PROT SERPL-MCNC: 7.2 G/DL (ref 6.4–8.2)
SODIUM SERPL-SCNC: 135 MMOL/L (ref 136–145)
TRIGL SERPL-MCNC: 97 MG/DL

## 2020-05-18 PROCEDURE — 80061 LIPID PANEL: CPT

## 2020-05-18 PROCEDURE — 80053 COMPREHEN METABOLIC PANEL: CPT

## 2020-05-18 PROCEDURE — 36415 COLL VENOUS BLD VENIPUNCTURE: CPT

## 2020-06-22 ENCOUNTER — TELEPHONE (OUTPATIENT)
Dept: INTERNAL MEDICINE CLINIC | Facility: CLINIC | Age: 40
End: 2020-06-22

## 2020-06-22 DIAGNOSIS — R63.5 WEIGHT GAIN: Primary | ICD-10-CM

## 2020-08-04 ENCOUNTER — OFFICE VISIT (OUTPATIENT)
Dept: INTERNAL MEDICINE CLINIC | Facility: CLINIC | Age: 40
End: 2020-08-04
Payer: COMMERCIAL

## 2020-08-04 VITALS
HEIGHT: 73 IN | DIASTOLIC BLOOD PRESSURE: 78 MMHG | OXYGEN SATURATION: 97 % | BODY MASS INDEX: 40.69 KG/M2 | WEIGHT: 307 LBS | HEART RATE: 72 BPM | SYSTOLIC BLOOD PRESSURE: 124 MMHG | TEMPERATURE: 97.9 F

## 2020-08-04 DIAGNOSIS — I10 ESSENTIAL HYPERTENSION: Primary | ICD-10-CM

## 2020-08-04 DIAGNOSIS — I48.0 PAF (PAROXYSMAL ATRIAL FIBRILLATION) (HCC): ICD-10-CM

## 2020-08-04 DIAGNOSIS — E66.01 MORBID OBESITY WITH BMI OF 40.0-44.9, ADULT (HCC): ICD-10-CM

## 2020-08-04 PROCEDURE — 3078F DIAST BP <80 MM HG: CPT | Performed by: INTERNAL MEDICINE

## 2020-08-04 PROCEDURE — 3008F BODY MASS INDEX DOCD: CPT | Performed by: INTERNAL MEDICINE

## 2020-08-04 PROCEDURE — 3074F SYST BP LT 130 MM HG: CPT | Performed by: INTERNAL MEDICINE

## 2020-08-04 PROCEDURE — 4004F PT TOBACCO SCREEN RCVD TLK: CPT | Performed by: INTERNAL MEDICINE

## 2020-08-04 PROCEDURE — 99214 OFFICE O/P EST MOD 30 MIN: CPT | Performed by: INTERNAL MEDICINE

## 2020-08-04 NOTE — PROGRESS NOTES
Assessment/Plan:         Diagnoses and all orders for this visit:    Essential hypertension  Bp much better on current regimen Continue current Rx  Increase water intake No added salt     Morbid obesity with BMI of 40 0-44 9, adult ([address] )  He is continuing weight loss efforts   Refill contrave     PAF (paroxysmal atrial fibrillation) (HCC)  Pt stable No recurrent sxs        Rto 3 months      Patient ID: Helio Harris is a 36 y o  male  HPI  Pt doing better overall BP much better on current regimen No headaches He had been below 300 weight wise but recently initiated moving so weight flux about 5 pounds   He has been tolerating contrave and initially thought it helped       Review of Systems   Constitutional: Negative  HENT: Negative  Respiratory: Negative  Cardiovascular: Negative  Gastrointestinal: Negative  Genitourinary: Negative  Musculoskeletal: Positive for arthralgias  Negative for joint swelling  Skin: Negative  Neurological: Negative  Hematological: Negative  Psychiatric/Behavioral: Negative  Past Medical History:   Diagnosis Date    Aortic valve cusp abnormality     "bicuspid aortic valve"    Hypertension      Past Surgical History:   Procedure Laterality Date    ANTERIOR CRUCIATE LIGAMENT REPAIR Right     KNEE ARTHROSCOPY Right     OTHER SURGICAL HISTORY      SAE    [place] SHLDR ARTHROSCOP,SURG,REPAIR,SLAP LESION Left 5/26/2016    Procedure: SHOULDER ARTHROSCOPY SLAP REPAIR;  Surgeon: Kevyn Lynne MD;  [facility];  Service: Orthopedics    [place] SHLDR ARTHROSCOP,SURG,REPAIR,SLAP LESION Right 11/26/2018    Procedure: Shoulder Arthroscopy with labral repair and biceps tenotomy;  Surgeon: Kevyn Lynne MD;  [facility];  Service: Orthopedics    ROTATOR CUFF REPAIR Left     VASECTOMY       Social History     Socioeconomic History    Marital status:       Spouse name: Not on file    Number of children: Not on file    Years of education: Not on file    Highest education level: Not on file   Occupational History    Not on file   Social Needs    Financial resource strain: Not on file    Food insecurity     Worry: Not on file     Inability: Not on file    Transportation needs     Medical: Not on file     Non-medical: Not on file   Tobacco Use    Smoking status: Never Smoker    Smokeless tobacco: Current User   Substance and Sexual Activity    Alcohol use: Yes     Comment: rare    Drug use: No    Sexual activity: Yes   Lifestyle    Physical activity     Days per week: Not on file     Minutes per session: Not on file    Stress: Not on file   Relationships    Social connections     Talks on phone: Not on file     Gets together: Not on file     Attends Druze service: Not on file     Active member of club or organization: Not on file     Attends meetings of clubs or organizations: Not on file     Relationship status: Not on file    Intimate partner violence     Fear of current or ex partner: Not on file     Emotionally abused: Not on file     Physically abused: Not on file     Forced sexual activity: Not on file   Other Topics Concern    Not on file   Social History Narrative    Always uses seat belt     Caffeine Use     Dental care regularly           Allergies   Allergen Reactions    Penicillins Hives            /78   Pulse 72   Temp 97 9 °F (36 6 °C) (Tympanic)   Ht 6' 1"   Wt (!) 139 kg (307 lb)   SpO2 97%   BMI 40 50 kg/m²          Physical Exam   Constitutional: He is oriented to person, place, and time  Non-toxic appearance  He does not appear ill  No distress  HENT:   Head: Normocephalic and atraumatic  Nose: Nose normal  No rhinorrhea or congestion  Mouth/Throat: Mucous membranes are dry  No oropharyngeal exudate or posterior oropharyngeal erythema  Oropharynx is clear  Eyes: Pupils are equal, round, and reactive to light  Conjunctivae are normal  No scleral icterus  Neck: Normal range of motion  Neck supple  Cardiovascular: Normal rate, regular rhythm and normal pulses  Murmur heard  Pulmonary/Chest: Effort normal and breath sounds normal  No respiratory distress  He has no wheezes  Abdominal: Soft  Normal appearance and bowel sounds are normal  He exhibits no distension  There is no abdominal tenderness  Musculoskeletal: Normal range of motion  General: No swelling or tenderness  Neurological: He is alert and oriented to person, place, and time  No cranial nerve deficit  Skin: Skin is warm and dry  Capillary refill takes less than 2 seconds  He is not diaphoretic  Psychiatric: His behavior is normal  Mood, judgment and thought content normal    Vitals reviewed

## 2020-11-05 ENCOUNTER — OFFICE VISIT (OUTPATIENT)
Dept: INTERNAL MEDICINE CLINIC | Facility: CLINIC | Age: 40
End: 2020-11-05
Payer: COMMERCIAL

## 2020-11-05 VITALS
BODY MASS INDEX: 41.35 KG/M2 | HEIGHT: 73 IN | OXYGEN SATURATION: 97 % | HEART RATE: 75 BPM | WEIGHT: 312 LBS | DIASTOLIC BLOOD PRESSURE: 78 MMHG | SYSTOLIC BLOOD PRESSURE: 130 MMHG | TEMPERATURE: 97.2 F

## 2020-11-05 DIAGNOSIS — R63.5 WEIGHT GAIN: ICD-10-CM

## 2020-11-05 DIAGNOSIS — Z23 FLU VACCINE NEED: ICD-10-CM

## 2020-11-05 DIAGNOSIS — I48.0 PAF (PAROXYSMAL ATRIAL FIBRILLATION) (HCC): ICD-10-CM

## 2020-11-05 DIAGNOSIS — I10 ESSENTIAL HYPERTENSION: Primary | ICD-10-CM

## 2020-11-05 PROCEDURE — 90686 IIV4 VACC NO PRSV 0.5 ML IM: CPT

## 2020-11-05 PROCEDURE — 99214 OFFICE O/P EST MOD 30 MIN: CPT | Performed by: INTERNAL MEDICINE

## 2020-11-05 PROCEDURE — 3008F BODY MASS INDEX DOCD: CPT | Performed by: INTERNAL MEDICINE

## 2020-11-05 PROCEDURE — 3725F SCREEN DEPRESSION PERFORMED: CPT | Performed by: INTERNAL MEDICINE

## 2020-11-05 PROCEDURE — 90471 IMMUNIZATION ADMIN: CPT

## 2020-11-05 PROCEDURE — 3078F DIAST BP <80 MM HG: CPT | Performed by: INTERNAL MEDICINE

## 2020-11-05 PROCEDURE — 1036F TOBACCO NON-USER: CPT | Performed by: INTERNAL MEDICINE

## 2020-11-05 PROCEDURE — 3075F SYST BP GE 130 - 139MM HG: CPT | Performed by: INTERNAL MEDICINE

## 2020-11-05 RX ORDER — NALTREXONE HYDROCHLORIDE AND BUPROPION HYDROCHLORIDE 8; 90 MG/1; MG/1
1 TABLET, EXTENDED RELEASE ORAL 2 TIMES DAILY
Qty: 60 TABLET | Refills: 1 | Status: SHIPPED | OUTPATIENT
Start: 2020-11-05 | End: 2021-04-19 | Stop reason: ALTCHOICE

## 2021-01-04 ENCOUNTER — TELEPHONE (OUTPATIENT)
Dept: INTERNAL MEDICINE CLINIC | Facility: CLINIC | Age: 41
End: 2021-01-04

## 2021-01-04 DIAGNOSIS — Z20.822 EXPOSURE TO COVID-19 VIRUS: ICD-10-CM

## 2021-01-04 DIAGNOSIS — Z20.822 EXPOSURE TO COVID-19 VIRUS: Primary | ICD-10-CM

## 2021-01-04 PROCEDURE — U0003 INFECTIOUS AGENT DETECTION BY NUCLEIC ACID (DNA OR RNA); SEVERE ACUTE RESPIRATORY SYNDROME CORONAVIRUS 2 (SARS-COV-2) (CORONAVIRUS DISEASE [COVID-19]), AMPLIFIED PROBE TECHNIQUE, MAKING USE OF HIGH THROUGHPUT TECHNOLOGIES AS DESCRIBED BY CMS-2020-01-R: HCPCS | Performed by: INTERNAL MEDICINE

## 2021-01-04 NOTE — TELEPHONE ENCOUNTER
Patient states Priyanka Keepers tested positive for covid  Patient is asymptomatic at this time  Asking for swab order for [place] tent  States he went to [facility] for swab and they instructed him to go to [facility] for testing

## 2021-01-05 LAB — SARS-COV-2 RNA SPEC QL NAA+PROBE: NOT DETECTED

## 2021-02-04 ENCOUNTER — OFFICE VISIT (OUTPATIENT)
Dept: INTERNAL MEDICINE CLINIC | Facility: CLINIC | Age: 41
End: 2021-02-04
Payer: COMMERCIAL

## 2021-02-04 VITALS
HEIGHT: 73 IN | OXYGEN SATURATION: 97 % | DIASTOLIC BLOOD PRESSURE: 78 MMHG | BODY MASS INDEX: 41.75 KG/M2 | WEIGHT: 315 LBS | SYSTOLIC BLOOD PRESSURE: 130 MMHG | TEMPERATURE: 97.5 F | HEART RATE: 88 BPM

## 2021-02-04 DIAGNOSIS — I10 ESSENTIAL HYPERTENSION: ICD-10-CM

## 2021-02-04 DIAGNOSIS — Z98.890 S/P SHOULDER SURGERY: ICD-10-CM

## 2021-02-04 DIAGNOSIS — E66.01 MORBID OBESITY (HCC): Primary | ICD-10-CM

## 2021-02-04 PROCEDURE — 3008F BODY MASS INDEX DOCD: CPT | Performed by: INTERNAL MEDICINE

## 2021-02-04 PROCEDURE — 3078F DIAST BP <80 MM HG: CPT | Performed by: INTERNAL MEDICINE

## 2021-02-04 PROCEDURE — 99214 OFFICE O/P EST MOD 30 MIN: CPT | Performed by: INTERNAL MEDICINE

## 2021-02-04 PROCEDURE — 3075F SYST BP GE 130 - 139MM HG: CPT | Performed by: INTERNAL MEDICINE

## 2021-02-04 PROCEDURE — 1036F TOBACCO NON-USER: CPT | Performed by: INTERNAL MEDICINE

## 2021-02-04 NOTE — PROGRESS NOTES
Assessment/Plan:         Diagnoses and all orders for this visit:    Morbid obesity ([facility] [address] )  -     Cancel: Ambulatory referral to Weight Management; Future  -     Ambulatory referral to Weight Management; Future  Pt will setup appt with weight mgmt and would like assistance with regimen for effective weight loss  Recent calorie take may not be enough calories/day Pt stated he would consider surgery if nonsurgical regimen not effective   S/P shoulder surgery  Pt remains limited and has chronic pain - he will consider another opinion for options but deferred referral today     Essential hypertension  Bp stable He had video visit with cardio in May   BP stable and he will work on [employer] mgmt       Rto 6months      Patient ID: Lizzy Parnell is a 36 y o  male  HPI   Pt doing ok Frustrated he cannot lose weight he drinks 2 meal supplements/day and one meal daily He has not resumed exercise but plans to try His  had covid - he was quarantined but did not get the virus His shoulder remains an issue to the point he sleeps in recliner because he cannot get comfortable No chest pain or palpitations He has not been checking his bp as he has not had sxs recently Work ok Stress at times with kids at home/school etc     BMI Counseling: Body mass index is 42 75 kg/m²  The BMI is above normal  Nutrition recommendations include consuming healthier snacks and moderation in carbohydrate intake  Exercise recommendations include exercising 3-5 times per week  Review of Systems   Constitutional: Negative  Negative for activity change, chills, fatigue and fever  HENT: Negative  Respiratory: Negative  Cardiovascular: Negative  Gastrointestinal: Negative  Genitourinary: Negative  Musculoskeletal: Positive for arthralgias and myalgias  Negative for joint swelling and neck pain  Neurological: Negative for dizziness, light-headedness and headaches  Hematological: Negative for adenopathy  Psychiatric/Behavioral: Negative for sleep disturbance  The patient is not nervous/anxious  Past Medical History:   Diagnosis Date    Aortic valve cusp abnormality     "bicuspid aortic valve"    Hypertension      Past Surgical History:   Procedure Laterality Date    ANTERIOR CRUCIATE LIGAMENT REPAIR Right     KNEE ARTHROSCOPY Right     OTHER SURGICAL HISTORY      SAE    [place] SHLDR ARTHROSCOP,SURG,REPAIR,SLAP LESION Left 5/26/2016    Procedure: SHOULDER ARTHROSCOPY SLAP REPAIR;  Surgeon: Bethany Abraham MD;  [facility];  Service: Orthopedics    [place] SHLDR ARTHROSCOP,SURG,REPAIR,SLAP LESION Right 11/26/2018    Procedure: Shoulder Arthroscopy with labral repair and biceps tenotomy;  Surgeon: Bethany Abraham MD;  [facility];  Service: Orthopedics    ROTATOR CUFF REPAIR Left     VASECTOMY       Social History     Socioeconomic History    Marital status:       Spouse name: Not on file    Number of children: Not on file    Years of education: Not on file    Highest education level: Not on file   Occupational History    Not on file   Social Needs    Financial resource strain: Not on file    Food insecurity     Worry: Not on file     Inability: Not on file    Transportation needs     Medical: Not on file     Non-medical: Not on file   Tobacco Use    Smoking status: Never Smoker    Smokeless tobacco: Current User   Substance and Sexual Activity    Alcohol use: Yes     Comment: rare    Drug use: No    Sexual activity: Yes   Lifestyle    Physical activity     Days per week: Not on file     Minutes per session: Not on file    Stress: Not on file   Relationships    Social connections     Talks on phone: Not on file     Gets together: Not on file     Attends Protestant service: Not on file     Active member of club or organization: Not on file     Attends meetings of clubs or organizations: Not on file     Relationship status: Not on file    Intimate partner violence     Fear of current or ex partner: Not on file     Emotionally abused: Not on file     Physically abused: Not on file     Forced sexual activity: Not on file   Other Topics Concern    Not on file   Social History Narrative    Always uses seat belt     Caffeine Use     Dental care regularly           Allergies   Allergen Reactions    Penicillins Hives           /78   Pulse 88   Temp 97 5 °F (36 4 °C) (Temporal)   Ht 6' 1" (1 854 m)   Wt (!) 147 kg (324 lb)   SpO2 97%   BMI 42 75 kg/m²          Physical Exam  Vitals signs reviewed  Constitutional:       General: He is not in acute distress  Appearance: Normal appearance  He is not ill-appearing, toxic-appearing or diaphoretic  HENT:      Head: Normocephalic and atraumatic  Right Ear: Tympanic membrane, ear canal and external ear normal  There is no impacted cerumen  Left Ear: Tympanic membrane, ear canal and external ear normal  There is no impacted cerumen  Nose: Nose normal       Mouth/Throat:      Mouth: Mucous membranes are dry  Pharynx: No posterior oropharyngeal erythema  Eyes:      General: No scleral icterus  Extraocular Movements: Extraocular movements intact  Conjunctiva/sclera: Conjunctivae normal       Pupils: Pupils are equal, round, and reactive to light  Neck:      Musculoskeletal: Normal range of motion and neck supple  No neck rigidity  Cardiovascular:      Rate and Rhythm: Normal rate and regular rhythm  Pulses: Normal pulses  Heart sounds: Normal heart sounds  No murmur  No gallop  Pulmonary:      Effort: Pulmonary effort is normal  No respiratory distress  Breath sounds: Normal breath sounds  No wheezing  Abdominal:      General: Bowel sounds are normal  There is no distension  Palpations: Abdomen is soft  Tenderness: There is no abdominal tenderness  Musculoskeletal:         General: Tenderness and deformity present     Lymphadenopathy:      Cervical: No cervical adenopathy  Skin:     Coloration: Skin is not jaundiced  Neurological:      General: No focal deficit present  Mental Status: He is alert and oriented to person, place, and time  Mental status is at baseline  Cranial Nerves: No cranial nerve deficit  Psychiatric:         Mood and Affect: Mood normal          Behavior: Behavior normal          Thought Content:  Thought content normal          Judgment: Judgment normal

## 2021-04-19 ENCOUNTER — CONSULT (OUTPATIENT)
Dept: BARIATRICS | Facility: CLINIC | Age: 41
End: 2021-04-19
Payer: COMMERCIAL

## 2021-04-19 VITALS
HEIGHT: 73 IN | TEMPERATURE: 98 F | WEIGHT: 315 LBS | HEART RATE: 64 BPM | DIASTOLIC BLOOD PRESSURE: 80 MMHG | SYSTOLIC BLOOD PRESSURE: 130 MMHG | BODY MASS INDEX: 41.75 KG/M2 | OXYGEN SATURATION: 98 %

## 2021-04-19 DIAGNOSIS — R63.5 ABNORMAL WEIGHT GAIN: ICD-10-CM

## 2021-04-19 DIAGNOSIS — E66.01 MORBID OBESITY WITH BMI OF 40.0-44.9, ADULT (HCC): Primary | ICD-10-CM

## 2021-04-19 DIAGNOSIS — R73.01 IFG (IMPAIRED FASTING GLUCOSE): ICD-10-CM

## 2021-04-19 DIAGNOSIS — Z91.89 AT RISK FOR SLEEP APNEA: ICD-10-CM

## 2021-04-19 DIAGNOSIS — I48.0 PAF (PAROXYSMAL ATRIAL FIBRILLATION) (HCC): ICD-10-CM

## 2021-04-19 DIAGNOSIS — I10 ESSENTIAL HYPERTENSION: ICD-10-CM

## 2021-04-19 PROCEDURE — 3075F SYST BP GE 130 - 139MM HG: CPT | Performed by: PHYSICIAN ASSISTANT

## 2021-04-19 PROCEDURE — 3079F DIAST BP 80-89 MM HG: CPT | Performed by: PHYSICIAN ASSISTANT

## 2021-04-19 PROCEDURE — 3008F BODY MASS INDEX DOCD: CPT | Performed by: PHYSICIAN ASSISTANT

## 2021-04-19 PROCEDURE — 99244 OFF/OP CNSLTJ NEW/EST MOD 40: CPT | Performed by: PHYSICIAN ASSISTANT

## 2021-04-19 PROCEDURE — 1036F TOBACCO NON-USER: CPT | Performed by: PHYSICIAN ASSISTANT

## 2021-04-19 NOTE — ASSESSMENT & PLAN NOTE
-Discussed options of [facility]CORE-Intensive Lifestyle Intervention Program, Very Low Calorie Diet-VLCD, Conservative Program, Damian-En-Y Gastric Bypass and Vertical Sleeve Gastrectomy and the role of weight loss medications   -Avoid sympathomimetics: afib  -Trialed Contrave, but did not have weight loss  -Initial weight loss goal of 5-10% weight loss for improved health  -Screening labs: insulin, A1c, TSH with labs as ordered  -Patient is interested in pursuing Conservative Program vs Bariatric surgery  -Risks of tobacco/smokeless tobacco reviewed   Encouraged cessation  -Does not eat vegetables  -Calorie goals, sample menu, portion size guidelines, and food logging reviewed with the patient    -Discussed option of Saxenda vs Topamax for weight loss

## 2021-04-19 NOTE — PROGRESS NOTES
Assessment/Plan: Morbid obesity with BMI of 40 0-44 9, adult ([facility] [address] )  -Discussed options of Healthy[facility]-Intensive Lifestyle Intervention Program, Very Low Calorie Diet-VLCD, Conservative Program, Damian-En-Y Gastric Bypass and Vertical Sleeve Gastrectomy and the role of weight loss medications   -Avoid sympathomimetics: afib  -Trialed Contrave, but did not have weight loss  -Initial weight loss goal of 5-10% weight loss for improved health  -Screening labs: insulin, A1c, TSH with labs as ordered  -Patient is interested in pursuing Conservative Program vs Bariatric surgery  -Risks of tobacco/smokeless tobacco reviewed  Encouraged cessation  -Does not eat vegetables  -Calorie goals, sample menu, portion size guidelines, and food logging reviewed with the patient    -Discussed option of Saxenda vs Topamax for weight loss    PAF (paroxysmal atrial fibrillation) (HCC)  HTN/Afib  -taking lisinopril-HCTZ and diltiazem  -Sees [facility] cardiology    Follow up in one month with RD and approximately 3 months with Non-Surgical Physician/Advanced Practitioner  Goals:  Food log (ie ) www myfitnesspal com,sparkpeople  com,loseit com,calorieking  com,etc  baritastic  No sugary beverages  At least 64oz of water daily  Increase physical activity by 10 minutes daily  Gradually increase physical activity to a goal of 5 days per week for 30 minutes of MODERATE intensity PLUS 2 days per week of FULL BODY resistance training  5-10 servings of fruits and vegetables per day and 25-35 grams of dietary fiber per day, gradually increasing  Monitor blood pressure and notify the provider if consistently around 100/60 or you have symptoms of low blood pressure (lightheadedness/dizziness)  6174-2902 calories   If choosing starch pick whole grain/complex carbs and keep 1/2 cup: brown rice, quinoa, whole wheat pasta, sweet potato, chickpea noodles, red lentil noodles    VISIT SAXENDA  COM  INJECT SAXENDA DAILY SUBCUTANEOUSLY    -WEEK 1: 0 6mg daily  -if tolerated WEEK 2: 1 2mg daily  -if tolerated WEEK 3: 1 8mg daily  -if tolerated WEEK 4: 2 4mg daily  -if tolerated WEEK 5: 3mg daily  -IF NAUSEA/VOMITING DEVELOP STOP MEDICATION FOR A FEW DAYS AND DECREASE TO PREVIOUSLY TOLERATED DOSE  STAY HYDRATED  -IF YOU STOP THE MEDICATION FOR 3 DAYS RESTART AT THE LOWEST DOSE (0 6 mg) AND FOLLOW ABOVE DOSING INSTRUCTIONS  -IF YOU DEVELOP SEVERE ABDOMINAL PAIN WHICH MAY RADIATE TO THE BACK, SOMETIMES ASSOCIATED WITH FEVER, AND VOMITING, STOP MEDICATION AND SEEK URGENT CARE AS THIS MAY BE A SIGN OF PANCREATITIS  Diagnoses and all orders for this visit:    Morbid obesity with BMI of 40 0-44 9, adult ([facility] [address] )  -     Ambulatory referral to Weight Management  -     Hemoglobin A1C; Future  -     Insulin, fasting; Future  -     TSH, 3rd generation with Free T4 reflex; Future  -     Ambulatory referral to Sleep Medicine; Future    Essential hypertension  -     Hemoglobin A1C; Future  -     Insulin, fasting; Future  -     TSH, 3rd generation with Free T4 reflex; Future  -     Ambulatory referral to Sleep Medicine; Future    PAF (paroxysmal atrial fibrillation) (HCC)  -     Hemoglobin A1C; Future  -     Insulin, fasting; Future  -     TSH, 3rd generation with Free T4 reflex; Future  -     Ambulatory referral to Sleep Medicine; Future    Abnormal weight gain  -     Hemoglobin A1C; Future  -     Insulin, fasting; Future  -     TSH, 3rd generation with Free T4 reflex; Future  -     Ambulatory referral to Sleep Medicine; Future    IFG (impaired fasting glucose)  -     Hemoglobin A1C; Future  -     Insulin, fasting; Future  -     TSH, 3rd generation with Free T4 reflex; Future  -     Ambulatory referral to Sleep Medicine; Future    At risk for sleep apnea  -     Hemoglobin A1C; Future  -     Insulin, fasting; Future  -     TSH, 3rd generation with Free T4 reflex; Future  -     Ambulatory referral to Sleep Medicine;  Future        Subjective:   Chief Complaint   Patient presents with    new patient     Patient ID: Cary Manuel  is a 36 y o  male with excess weight/obesity here to pursue weight management  Past Medical History:   Diagnosis Date    Aortic valve cusp abnormality     "bicuspid aortic valve"    Hypertension      HPI:  Obesity/Excess Weight:  Severity: Severe  Onset: 2010/2011  Lost 50 lbs in 2016, but has since regained  Cut calories to 1200; had more time to exercise and then had work schedule changes/life stressors  Modifiers: Diet and Exercise   Contrave  Contributing factors: Poor Food Choices, Insufficient Physical Activity and Insufficient time to make appropriate lifestyle changes, stress; works night shift  Associated symptoms: comorbid conditions and fatigue    Goals: lose 100 lbs  Hydration: 8-10 bottles of water; "every so often" has juice, soda, tea; drinks coffee, but black  Alcohol: monthly or less  Exercise: not currently  Occupation:   [employer]: 5/8    -States he is eating once a day and not losing weight    Denied FHX of lung cancer, he requested removal from chart- DONE    The following portions of the patient's history were reviewed and updated as appropriate: allergies, current medications, past family history, past medical history, past social history, past surgical history and problem list     Review of Systems   Constitutional: Negative for chills and fever  HENT: Negative for sore throat  Respiratory: Negative for cough and shortness of breath  Cardiovascular: Negative for chest pain and palpitations  Gastrointestinal: Negative for constipation and diarrhea  Genitourinary: Negative for dysuria  Musculoskeletal: Negative for arthralgias  Skin: Negative for rash  Neurological: Negative for headaches  Psychiatric/Behavioral: Negative        Objective:    /80 (BP Location: Left arm, Cuff Size: Large)   Pulse 64   Temp 98 °F (36 7 °C) (Temporal)   Ht 6' 1" (1 854 m)   Wt (!) 148 kg (326 lb)   SpO2 98%   BMI 43 01 kg/m² Physical Exam  Vitals signs and nursing note reviewed  Constitutional   General appearance: Abnormal   well developed and morbidly obese  Eyes No conjunctival pallor  Ears, Nose, Mouth, and Throat External ears without erythema, edema, or drainage  Pulmonary   Respiratory effort: No increased work of breathing or signs of respiratory distress  Auscultation of lungs: Clear to auscultation, equal breath sounds bilaterally, no wheezes, no rales, no rhonci  Cardiovascular   Auscultation of heart: Normal rate and rhythm, normal S1 and S2, without murmurs  Examination of extremities for edema and/or varicosities: Normal   no edema  Abdomen   Abdomen: Abnormal   The abdomen was obese  Bowel sounds were normal  The abdomen was soft and nontender     Musculoskeletal   Gait and station: Normal     Psychiatric   Orientation to person, place and time: Normal     Affect: appropriate

## 2021-04-19 NOTE — PATIENT INSTRUCTIONS
Goals: Food log (ie ) www myfitnesspal com,sparkpeople  com,loseit com,calorieking  com,etc  baritastic  No sugary beverages  At least 64oz of water daily  Increase physical activity by 10 minutes daily  Gradually increase physical activity to a goal of 5 days per week for 30 minutes of MODERATE intensity PLUS 2 days per week of FULL BODY resistance training  5-10 servings of fruits and vegetables per day and 25-35 grams of dietary fiber per day, gradually increasing  Monitor blood pressure and notify the provider if consistently around 100/60 or you have symptoms of low blood pressure (lightheadedness/dizziness)  6379-5949 calories   If choosing starch pick whole grain/complex carbs and keep 1/2 cup: brown rice, quinoa, whole wheat pasta, sweet potato, chickpea noodles, red lentil noodles    VISIT SAXENDA  COM  INJECT SAXENDA DAILY SUBCUTANEOUSLY  -WEEK 1: 0 6mg daily  -if tolerated WEEK 2: 1 2mg daily  -if tolerated WEEK 3: 1 8mg daily  -if tolerated WEEK 4: 2 4mg daily  -if tolerated WEEK 5: 3mg daily  -IF NAUSEA/VOMITING DEVELOP STOP MEDICATION FOR A FEW DAYS AND DECREASE TO PREVIOUSLY TOLERATED DOSE  STAY HYDRATED  -IF YOU STOP THE MEDICATION FOR 3 DAYS RESTART AT THE LOWEST DOSE (0 6 mg) AND FOLLOW ABOVE DOSING INSTRUCTIONS  -IF YOU DEVELOP SEVERE ABDOMINAL PAIN WHICH MAY RADIATE TO THE BACK, SOMETIMES ASSOCIATED WITH FEVER, AND VOMITING, STOP MEDICATION AND SEEK URGENT CARE AS THIS MAY BE A SIGN OF PANCREATITIS

## 2021-04-20 ENCOUNTER — TELEPHONE (OUTPATIENT)
Dept: BARIATRICS | Facility: CLINIC | Age: 41
End: 2021-04-20

## 2021-04-20 NOTE — TELEPHONE ENCOUNTER
Tried calling patient to schedule a 3 hr Sx Eval but patient was currently unavailable per message on his cell phone  No voicemail to leave a message  Will try reaching out to him again

## 2021-04-20 NOTE — TELEPHONE ENCOUNTER
----- Message from Deepa Davis PA-C sent at 4/19/2021  3:33 PM EDT -----  Regarding: RE: Weight loss surgery  Contact: [phone]  Please schedule surgical eval      Thank you    ----- Message -----  From: Shamika Renee  Sent: 4/19/2021   3:26 PM EDT  To: Deepa Davis PA-C  Subject: RE: Weight loss surgery                          Yes please  Thank you!     ----- Message -----  From: Deepa Davis PA-C  Sent: 4/19/21, 14:00  To: Shamika Renee  Subject: Weight loss surgery    Adrian Pérez,    I checked with our   For your insurance, you will need to do the 3 hour surgical eval to start the process for surgery and then will have the 6 months of medical weight management  Once that is complete you will have the surgical eval again and your insurance requires 4 months in the surgical program  The surgical site is in [facility]  Would you like to be schedule for the surgical evaluation?     Thank you,  May Garcia PA-C

## 2021-05-17 ENCOUNTER — TELEPHONE (OUTPATIENT)
Dept: BARIATRICS | Facility: CLINIC | Age: 41
End: 2021-05-17

## 2021-05-17 NOTE — TELEPHONE ENCOUNTER
Meet Dawson our dietician had asked me to call the patient about getting him set up for another appointment  He missed his last one and supposedly joann would not schedule him for a follow up  I called and left message for the patient to call the [place] office to get another appointment for weight management set up

## 2021-06-03 ENCOUNTER — OFFICE VISIT (OUTPATIENT)
Dept: SLEEP CENTER | Facility: CLINIC | Age: 41
End: 2021-06-03
Payer: COMMERCIAL

## 2021-06-03 VITALS
SYSTOLIC BLOOD PRESSURE: 126 MMHG | WEIGHT: 315 LBS | TEMPERATURE: 97.6 F | OXYGEN SATURATION: 96 % | HEART RATE: 81 BPM | BODY MASS INDEX: 41.75 KG/M2 | HEIGHT: 73 IN | DIASTOLIC BLOOD PRESSURE: 84 MMHG

## 2021-06-03 DIAGNOSIS — G47.33 OBSTRUCTIVE SLEEP APNEA: Primary | ICD-10-CM

## 2021-06-03 DIAGNOSIS — G47.10 HYPERSOMNOLENCE: ICD-10-CM

## 2021-06-03 DIAGNOSIS — R73.01 IFG (IMPAIRED FASTING GLUCOSE): ICD-10-CM

## 2021-06-03 DIAGNOSIS — G47.26 SHIFT WORK SLEEP DISORDER: ICD-10-CM

## 2021-06-03 DIAGNOSIS — E66.01 MORBID OBESITY WITH BMI OF 40.0-44.9, ADULT (HCC): ICD-10-CM

## 2021-06-03 DIAGNOSIS — Q23.1 BICUSPID AORTIC VALVE: ICD-10-CM

## 2021-06-03 DIAGNOSIS — I10 ESSENTIAL HYPERTENSION: ICD-10-CM

## 2021-06-03 DIAGNOSIS — R63.5 ABNORMAL WEIGHT GAIN: ICD-10-CM

## 2021-06-03 DIAGNOSIS — I48.0 PAF (PAROXYSMAL ATRIAL FIBRILLATION) (HCC): ICD-10-CM

## 2021-06-03 PROCEDURE — 99244 OFF/OP CNSLTJ NEW/EST MOD 40: CPT | Performed by: INTERNAL MEDICINE

## 2021-06-03 PROCEDURE — 3008F BODY MASS INDEX DOCD: CPT | Performed by: INTERNAL MEDICINE

## 2021-06-03 PROCEDURE — 1036F TOBACCO NON-USER: CPT | Performed by: INTERNAL MEDICINE

## 2021-06-03 PROCEDURE — 3074F SYST BP LT 130 MM HG: CPT | Performed by: INTERNAL MEDICINE

## 2021-06-03 PROCEDURE — 3079F DIAST BP 80-89 MM HG: CPT | Performed by: INTERNAL MEDICINE

## 2021-06-03 NOTE — PROGRESS NOTES
Review of Systems      Genitourinary need to urinate more than twice a night   Cardiology ankle/leg swelling   Gastrointestinal none   Neurology numbness/tingling of an extremity   Constitutional fatigue   Integumentary none   Psychiatry aggressiveness or irritability   Musculoskeletal joint pain, muscle aches, back pain, sciatica and leg cramps   Pulmonary snoring and difficulty breathing when lying flat    ENT none   Endocrine none   Hematological none

## 2021-06-03 NOTE — PATIENT INSTRUCTIONS
What is TARAH? Obstructive sleep apnea is a common and serious sleep disorder that causes you to stop breathing during sleep  The airway repeatedly becomes blocked, limiting the amount of air that reaches your lungs  When this happens, you may snore loudly or making choking noises as you try to breathe  Your brain and body becomes oxygen deprived and you may wake up  This may happen a few times a night, or in more severe cases, several hundred times a night  Sleep apnea can make you wake up in the morning feeling tired or unrefreshed even though you have had a full night of sleep  During the day, you may feel fatigued, have difficulty concentrating or you may even unintentionally fall asleep  This is because your body is waking up numerous times throughout the night, even though you might not be conscious of each awakening  The lack of oxygen your body receives can have negative long-term consequences for your health  This includes:  High blood pressure  Heart disease  Irregular heart rhythms  Stroke  Pre-diabetes and diabetes  Depression    Testing  An objective evaluation of your sleep may be needed before your board certified sleep physician can make a diagnosis  Options include:   In-lab overnight sleep study  This type of sleep study requires you to stay overnight at a sleep center, in a bed that may resemble a hotel room  You will sleep with sensors hooked up to various parts of your body  These sensors record your brain waves, heartbeat, breathing and movement  An overnight sleep study also provides your doctor with the most complete information about your sleep  Learn more about an overnight sleep study      Home sleep apnea test  Some patients with high risk factors for obstructive sleep apnea and no other medical disorders may be candidates for a home sleep apnea test  The testing equipment differs in that it is less complicated than what is used in an overnight sleep study   As such, does not give all the data an in-lab will and if negative, may not mean you do not have the problem  Treatment for sleep apnea  includes using a continuous positive airway pressure (CPAP) machine to keep your airway open during sleep  A mask is placed over your nose and mouth, or just your nose  The mask is hooked to the CPAP machine that blows a gentle stream of air into the mask when you breathe  This helps keep your airway open so you can breathe more regularly  Extra oxygen may be given to you through the machine  You may be given a mouth device  It looks like a mouth guard or dental retainer and stops your tongue and mouth tissues from blocking your throat while you sleep  Surgery may be needed to remove extra tissues that block your mouth, throat, or nose  Manage sleep apnea:   Do not smoke  Nicotine and other chemicals in cigarettes and cigars can cause lung damage  Ask your healthcare provider for information if you currently smoke and need help to quit  E-cigarettes or smokeless tobacco still contain nicotine  Talk to your healthcare provider before you use these products  Do not drink alcohol or take sedative medicine before you go to sleep  Alcohol and sedatives can relax the muscles and tissues around your throat  This can block the airflow to your lungs  Maintain a healthy weight  Excess tissue around your throat may restrict your breathing  Ask your healthcare provider for information if you need to lose weight  Sleep on your side or use pillows designed to prevent sleep apnea  This prevents your tongue or other tissues from blocking your throat  You can also raise the head of your bed  Driving Safety  Refrain from driving when drowsy  Follow up with your healthcare provider as directed:  Write down your questions so you remember to ask them during your visits  Go to AASM website for more information: Sleepeducation  org     What is TARAH?    Obstructive sleep apnea is a common and serious sleep disorder that causes you to stop breathing during sleep  The airway repeatedly becomes blocked, limiting the amount of air that reaches your lungs  When this happens, you may snore loudly or making choking noises as you try to breathe  Your brain and body becomes oxygen deprived and you may wake up  This may happen a few times a night, or in more severe cases, several hundred times a night  Sleep apnea can make you wake up in the morning feeling tired or unrefreshed even though you have had a full night of sleep  During the day, you may feel fatigued, have difficulty concentrating or you may even unintentionally fall asleep  This is because your body is waking up numerous times throughout the night, even though you might not be conscious of each awakening  The lack of oxygen your body receives can have negative long-term consequences for your health  This includes:  High blood pressure  Heart disease  Irregular heart rhythms  Stroke  Pre-diabetes and diabetes  Depression    Testing  An objective evaluation of your sleep may be needed before your board certified sleep physician can make a diagnosis  Options include:   In-lab overnight sleep study  This type of sleep study requires you to stay overnight at a sleep center, in a bed that may resemble a hotel room  You will sleep with sensors hooked up to various parts of your body  These sensors record your brain waves, heartbeat, breathing and movement  An overnight sleep study also provides your doctor with the most complete information about your sleep  Learn more about an overnight sleep study      Home sleep apnea test  Some patients with high risk factors for obstructive sleep apnea and no other medical disorders may be candidates for a home sleep apnea test  The testing equipment differs in that it is less complicated than what is used in an overnight sleep study   As such, does not give all the data an in-lab will and if negative, may not mean you do not have the problem  Treatment for sleep apnea  includes using a continuous positive airway pressure (CPAP) machine to keep your airway open during sleep  A mask is placed over your nose and mouth, or just your nose  The mask is hooked to the CPAP machine that blows a gentle stream of air into the mask when you breathe  This helps keep your airway open so you can breathe more regularly  Extra oxygen may be given to you through the machine  You may be given a mouth device  It looks like a mouth guard or dental retainer and stops your tongue and mouth tissues from blocking your throat while you sleep  Surgery may be needed to remove extra tissues that block your mouth, throat, or nose  Manage sleep apnea:   Do not smoke  Nicotine and other chemicals in cigarettes and cigars can cause lung damage  Ask your healthcare provider for information if you currently smoke and need help to quit  E-cigarettes or smokeless tobacco still contain nicotine  Talk to your healthcare provider before you use these products  Do not drink alcohol or take sedative medicine before you go to sleep  Alcohol and sedatives can relax the muscles and tissues around your throat  This can block the airflow to your lungs  Maintain a healthy weight  Excess tissue around your throat may restrict your breathing  Ask your healthcare provider for information if you need to lose weight  Sleep on your side or use pillows designed to prevent sleep apnea  This prevents your tongue or other tissues from blocking your throat  You can also raise the head of your bed  Driving Safety  Refrain from driving when drowsy  Follow up with your healthcare provider as directed:  Write down your questions so you remember to ask them during your visits  Go to AAS website for more information: Sleepeducation  org       Nursing Support:  When: Monday through Friday 7A-5PM except holidays  Where: Our direct line is [phone]      If you are having a true emergency please call 911  In the event that the line is busy or it is after hours please leave a voice message and we will return your call  Please speak clearly, leaving your full name, birth date, best number to reach you and the reason for your call  Medication refills: We will need the name of the medication, the dosage, the ordering provider, whether you get a 30 or 90 day refill, and the pharmacy name and address  Medications will be ordered by the provider only  Nurses cannot call in prescriptions  Please allow 7 days for medication refills  Physician requested updates: If your provider requested that you call with an update after starting medication, please be ready to provide us the medication and dosage, what time you take your medication, the time you attempt to fall asleep, time you fall asleep, when you wake up, and what time you get out of bed  Sleep Study Results: We will contact you with sleep study results and/or next steps after the physician has reviewed your testing

## 2021-06-03 NOTE — PROGRESS NOTES
Consultation - [address]  39 y o  male  PEU:[id number]  MOLLY:[id number]    Physician Requesting Consult: Sakshi Paul PA-C     Reason for Consult : At your kind request I saw Helio Harris for initial sleep evaluation today  The patient is planning Bariatric surgery and is here to also evaluate for Obstructive Sleep Apnea  PFSH, Problem List, Medications & Allergies were reviewed in EMR  Rajni Jim  has a past medical history of Aortic valve cusp abnormality and Hypertension  He has a current medication list which includes the following prescription(s): lisinopril-hydrochlorothiazide, diltiazem, and potassium chloride  HPI:  He has multiple comorbidities that place him at risk for sleep disordered breathing  He also has a history of loud snoring ongoing for around 20 years that disturbs others  He is not aware of snoring or breathing difficulties during sleep but has frequent interruptions of sleep  Other Complaints: none  Restless Leg Syndrome: reports no suggestive symptoms    Parasomnia: no features reported    Sleep Routine (on average): He works 3rd shift Typical Bedtime:  7:30 a m  Gets OOB:  1:30 p m  TIB:5 hrs  Sleep latency:<  30 minutes Sleep Interruptions:2-3/nite and is usually able to fall back asleep  Awakens: spontaneously  Upon awakening: never feels rested  Rajni Jim reports Excessive Daytime Sleepiness feels like napping but avoids and struggles with driving but rated himself at Total score: 8 /24 on the [place] Sleepiness Scale  Habits:  reports that he has never smoked  He uses smokeless tobacco  ;   E-Cigarette/Vaping    E-Cigarette Use Never User     ;  reports no history of drug use ;  reports current alcohol use  ; Caffeine use:limited ; Exercise routine: irregular   Family History:  Father was known to snore  ROS - see attached  Significant for approximately 30 lb weight gain in the past year  [place]      EXAM:  /84 (BP Location: Left arm, Cuff Size: Large)   Pulse 81   Temp 97 6 °F (36 4 °C) (Temporal)   Ht 6' 1" (1 854 m)   Wt (!) 145 kg (320 lb)   SpO2 96%   BMI 42 22 kg/m²    General: Well groomed male, well appearing, in no apparent distress  Neurological: Alert and orientated;  cooperative; Cranial nerves intact;    Psychiatric: Speech:clear and coherent;  Normal mood, affect & thought   Skin: warm and dry; Color& Hydration good; no facial rashes or lesions   HEENT:  Craniofacial anatomy: normal Sinuses: non- tender  TMJ: Normal    Eyes: EOM's intact;  conjunctiva/corneas clear   Ears: Externallyappear normal     Nasal Airway: assymetric nares Septum:intact; Mucosa: normal; Turbinates: normal; Rhinorrhea: None   Mouth: Lips: normal posture; Dentition: normal   Mucosa:moist  ; Hard Palate:normal    Oropharryx: crowded and AP narrowing Tongue: Mallampati:Class IV and MobileSoft Palate:  redundant  and Uvular Hypertrophy Tonsils: absent  Neck:is thick and excess fatty tissue; Neck Circumference: 19 5 "; Supple; no abnormal masses; Thyroid:normal  Trachea:central     Lymph: No Cervical or Submandibular Lymhadenopathy  Heart: S1,S2 normal; RRR; no gallop; 3/6 pansystolic murmur   Lungs: Respiratory Effort:normal  Air entry good bilaterally  No wheezes  No rales  Abdomen: Obese, Soft & non-tender    Extremities: No pedal edema  No clubbing or cyanosis  Musculoskeletal:  Motor normal; Gait:normal        IMPRESSION: Primary/Secondary Sleep Diagnoses (to Medical or Psych conditions) & Comorbidities   1  Obstructive sleep apnea  Ambulatory referral to Sleep Medicine    Home Study   2  Shift work sleep disorder     3  Hypersomnolence  Home Study   4  Morbid obesity with BMI of 40 0-44 9, adult [facility])  Ambulatory referral to Sleep Medicine    Home Study   5  Essential hypertension  Ambulatory referral to Sleep Medicine   6  Bicuspid aortic valve     7   PAF (paroxysmal atrial fibrillation) [facility])  Ambulatory referral to Sleep Medicine 8  Abnormal weight gain  Ambulatory referral to Sleep Medicine   9  IFG (impaired fasting glucose)  Ambulatory referral to Sleep Medicine        PLAN:   With respect to above conditions, comprehensive counseling provided on pathophysiology; effects on symptoms and comorbidities, diagnostic strategies & limitations; treatment options; risks or no treament; risks & benefits of the various therapeutic options; costs and insurance aspects  I advised weight reduction, avoiding sleeping supine, using alcohol or sedating medications close to bed time and on safe driving practices  Nocturnal polysomnography is indicated and a diagnostic study will be scheduled  Patient is willing to try Positive airway pressure therapy and will be scheduled for a titration study if indicated  He is unable to change to a regular shift at this time  I discussed strategies to cope with shift work and advised allowing sufficient opportunity for sleep  Follow-up to be scheduled after the studies to review results, further details of treatment options and to initiate/adjust therapy        Sincerely,     Authenticated electronically by Guillermina Spangler MD   on 02/93/17   Board Certified Specialist

## 2021-07-10 ENCOUNTER — APPOINTMENT (OUTPATIENT)
Dept: LAB | Facility: MEDICAL CENTER | Age: 41
End: 2021-07-10
Payer: COMMERCIAL

## 2021-07-10 DIAGNOSIS — Z91.89 AT RISK FOR SLEEP APNEA: ICD-10-CM

## 2021-07-10 DIAGNOSIS — R63.5 ABNORMAL WEIGHT GAIN: ICD-10-CM

## 2021-07-10 DIAGNOSIS — I48.0 PAF (PAROXYSMAL ATRIAL FIBRILLATION) (HCC): ICD-10-CM

## 2021-07-10 DIAGNOSIS — R73.01 IFG (IMPAIRED FASTING GLUCOSE): ICD-10-CM

## 2021-07-10 DIAGNOSIS — I10 ESSENTIAL HYPERTENSION: ICD-10-CM

## 2021-07-10 DIAGNOSIS — E66.01 MORBID OBESITY WITH BMI OF 40.0-44.9, ADULT (HCC): ICD-10-CM

## 2021-07-10 LAB
ALBUMIN SERPL BCP-MCNC: 3.9 G/DL (ref 3.5–5)
ALP SERPL-CCNC: 51 U/L (ref 46–116)
ALT SERPL W P-5'-P-CCNC: 26 U/L (ref 12–78)
ANION GAP SERPL CALCULATED.3IONS-SCNC: 6 MMOL/L (ref 4–13)
AST SERPL W P-5'-P-CCNC: 12 U/L (ref 5–45)
BILIRUB SERPL-MCNC: 0.66 MG/DL (ref 0.2–1)
BUN SERPL-MCNC: 15 MG/DL (ref 5–25)
CALCIUM SERPL-MCNC: 9 MG/DL (ref 8.3–10.1)
CHLORIDE SERPL-SCNC: 105 MMOL/L (ref 100–108)
CO2 SERPL-SCNC: 25 MMOL/L (ref 21–32)
CREAT SERPL-MCNC: 1.12 MG/DL (ref 0.6–1.3)
EST. AVERAGE GLUCOSE BLD GHB EST-MCNC: 103 MG/DL
GFR SERPL CREATININE-BSD FRML MDRD: 81 ML/MIN/1.73SQ M
GLUCOSE P FAST SERPL-MCNC: 91 MG/DL (ref 65–99)
HBA1C MFR BLD: 5.2 %
INSULIN SERPL-ACNC: 9.2 MU/L (ref 3–25)
LDLC SERPL DIRECT ASSAY-MCNC: 101 MG/DL (ref 0–100)
POTASSIUM SERPL-SCNC: 4 MMOL/L (ref 3.5–5.3)
PROT SERPL-MCNC: 6.8 G/DL (ref 6.4–8.2)
SODIUM SERPL-SCNC: 136 MMOL/L (ref 136–145)
TSH SERPL DL<=0.05 MIU/L-ACNC: 1.8 UIU/ML (ref 0.36–3.74)

## 2021-07-10 PROCEDURE — 83525 ASSAY OF INSULIN: CPT

## 2021-07-10 PROCEDURE — 83036 HEMOGLOBIN GLYCOSYLATED A1C: CPT

## 2021-07-10 PROCEDURE — 80053 COMPREHEN METABOLIC PANEL: CPT

## 2021-07-10 PROCEDURE — 83721 ASSAY OF BLOOD LIPOPROTEIN: CPT

## 2021-07-10 PROCEDURE — 84443 ASSAY THYROID STIM HORMONE: CPT

## 2021-07-10 PROCEDURE — 36415 COLL VENOUS BLD VENIPUNCTURE: CPT

## 2021-07-26 ENCOUNTER — RA CDI HCC (OUTPATIENT)
Dept: OTHER | Facility: HOSPITAL | Age: 41
End: 2021-07-26

## 2021-07-26 NOTE — PROGRESS NOTES
[facility]  coding opportunities             Chart reviewed, (number of) suggestions sent to provider: 1     Problem listed updated  Provider Accepted, (number of) suggestions accepted: 1               Patients insurance company: [employer] (Medicare Advantage and Commercial)     Visit status: Patient canceled the appointment        [facility]  coding opportunities             Chart reviewed, (number of) suggestions sent to provider: 1     Problem listed updated   Provider Accepted, (number of) suggestions accepted: 1               Patients insurance company: [employer] (Medicare Advantage and Commercial)           [place] [address]  coding opportunities             Chart reviewed, (number of) suggestions sent to provider: 1                  Patients insurance company: [employer] (Medicare Advantage and Commercial)           Please refer to 5/10/2021 - [facility] Cardiology notes    I71/ 9:  [facility]) - Please review for current status and assess and document, if applicable - last assessed at [facility]     If this is correct, please document and assess at your next visit 7/29/2021

## 2021-07-28 PROBLEM — I71.9 AORTIC ANEURYSM OF UNSPECIFIED SITE, WITHOUT RUPTURE (HCC): Status: ACTIVE | Noted: 2021-07-28

## 2021-09-13 ENCOUNTER — CLINICAL SUPPORT (OUTPATIENT)
Dept: BARIATRICS | Facility: CLINIC | Age: 41
End: 2021-09-13

## 2021-09-13 VITALS
WEIGHT: 315 LBS | SYSTOLIC BLOOD PRESSURE: 130 MMHG | HEART RATE: 69 BPM | BODY MASS INDEX: 41.75 KG/M2 | TEMPERATURE: 96.7 F | HEIGHT: 73 IN | DIASTOLIC BLOOD PRESSURE: 88 MMHG

## 2021-09-13 DIAGNOSIS — E66.01 MORBID OBESITY (HCC): Primary | ICD-10-CM

## 2021-09-13 DIAGNOSIS — R63.5 WEIGHT GAIN: ICD-10-CM

## 2021-09-13 DIAGNOSIS — Z01.818 PREOPERATIVE CLEARANCE: ICD-10-CM

## 2021-09-13 DIAGNOSIS — I10 ESSENTIAL HYPERTENSION: ICD-10-CM

## 2021-09-13 DIAGNOSIS — E66.01 MORBID OBESITY WITH BMI OF 40.0-44.9, ADULT (HCC): ICD-10-CM

## 2021-09-13 DIAGNOSIS — Z72.0 NICOTINE ABUSE: ICD-10-CM

## 2021-09-13 DIAGNOSIS — Z01.812 BLOOD TESTS PRIOR TO TREATMENT OR PROCEDURE: ICD-10-CM

## 2021-09-13 DIAGNOSIS — E78.5 HYPERLIPIDEMIA: ICD-10-CM

## 2021-09-13 PROCEDURE — RECHECK

## 2021-09-13 NOTE — PROGRESS NOTES
Bariatric Nutrition Assessment Note    Type of surgery    Preop 4 months  Surgery Date: TBD- Tentative February 2022  Surgeon: Dr Jak Jessica  39 y o   male     Wt with BMI of 25: 188 6lbs  Pre-Op Excess Wt: 126 9lbs  /88 (BP Location: Left arm, Patient Position: Sitting, Cuff Size: Standard)   Pulse 69   Temp (!) 96 7 °F (35 9 °C) (Tympanic)   Ht 6' 0 5" (1 842 m)   Wt (!) 143 kg (315 lb 8 oz)   BMI 42 20 kg/m²     [place] [facility]  [place] Equation:     Weight maintenance= 2858 kcal;/day  Estimated calories for weight loss 4242-5560 kcal/day ( 1-2# per wk wt loss - sedentary )  Estimated protein needs 85 7-102 9g/day (1 0-1 2 gms/kg IBW )   Estimated fluid needs 2571-3000ml/day (30-35 ml/kg IBW )      Weight History   Onset of Obesity: Adult/college  Family history of obesity: No  Wt Loss Attempts: Exercise  Fasting  Meal Replacements (Medifast, Slim Fast, etc )  OTC meds/supplements  Self Created Diets (Portion Control, Healthy Food Choices, etc )  Patient has tried the above for 6 months or more with insufficient weight loss or weight regain, which is why patient has requested to be evaluated for weight loss surgery today  Maximum Wt Lost: 5 years ago lost 40lbs from 320- to 280lbs with high exercise level      Review of History and Medications   Past Medical History:   Diagnosis Date    Aortic valve cusp abnormality     "bicuspid aortic valve"    Hypertension      Past Surgical History:   Procedure Laterality Date    ANTERIOR CRUCIATE LIGAMENT REPAIR Right     KNEE ARTHROSCOPY Right     OTHER SURGICAL HISTORY      SAE    CT SHLDR ARTHROSCOP,SURG,REPAIR,SLAP LESION Left 5/26/2016    Procedure: SHOULDER ARTHROSCOPY SLAP REPAIR;  Surgeon: Solitario Lott MD;  [facility];  Service: Orthopedics     [address] Right 11/26/2018    Procedure: Shoulder Arthroscopy with labral repair and biceps tenotomy;  Surgeon: Yair Zaragoza Javier Loaiza MD;  [facility];  Service: Orthopedics    ROTATOR CUFF REPAIR Left     VASECTOMY       Social History     Socioeconomic History    Marital status:      Spouse name: None    Number of children: None    Years of education: None    Highest education level: None   Occupational History    None   Tobacco Use    Smoking status: Never Smoker    Smokeless tobacco: Current User   Vaping Use    Vaping Use: Never used   Substance and Sexual Activity    Alcohol use: Yes     Comment: rare    Drug use: No    Sexual activity: Yes   Other Topics Concern    None   Social History Narrative    Always uses seat belt     Caffeine Use     Dental care regularly           Social Determinants of Health     Financial Resource Strain:     Difficulty of Paying Living Expenses:    Food Insecurity:     Worried About Running Out of Food in the Last Year:     [address] in the Last Year:    Transportation Needs:     Lack of Transportation (Medical):      Lack of Transportation (Non-Medical):    Physical Activity:     Days of Exercise per Week:     Minutes of Exercise per Session:    Stress:     Feeling of Stress :    Social Connections:     Frequency of Communication with Friends and Family:     Frequency of Social Gatherings with Friends and Family:     Attends Restorationist Services:     Active Member of Clubs or Organizations:     Attends Club or Organization Meetings:     Marital Status:    Intimate Partner Violence:     Fear of Current or Ex-Partner:     Emotionally Abused:     Physically Abused:     Sexually Abused:        Current Outpatient Medications:     diltiazem (CARDIZEM CD) 180 mg 24 hr capsule, Take 180 mg by mouth daily, Disp: , Rfl:     lisinopril-hydrochlorothiazide (PRINZIDE,ZESTORETIC) 20-12 5 MG per tablet, Take 2 tablets by mouth daily, Disp: , Rfl:     potassium chloride (K-DUR,KLOR-CON) 20 mEq tablet, Take 20 mEq by mouth daily, Disp: , Rfl:      Food Intake and Lifestyle Assessment   Food Intake Assessment completed via usual diet recall  Wakes 5:30am  Leaves for work 7am  Breakfast: 8:00-8:30am: greek yogurt or crunchy [place] granola bar, water  Snack: none   Lunch: none  Drinks water  Snack: none  Home 5:00-5:30pm  Dinner:  Sometimes croc pot meals: chicken breast, maybe a vegetable or pot roast     Sometimes gets take-out: grilled cheese or pizza or subs or [place] cuisine  Sometimes makes quick burgers and fries or chicken tacos or lasagna, etc   Snack: sometimes Tastycake or cookies or brownies or ice cream 3-4 nights a week  Beverage intake: water daily  Sometimes black coffee  Occasional sugar-free energy drink "bang" 16oz 1-2 per week  Protein supplement: was drinking [facility] lean shake meal replacements, not currently  Estimated protein intake per day: 50-70g  Estimated fluid intake per day: 1 gallon water per day  Meals eaten away from home: several dinners per week  Typical meal pattern: 1 meals per day and 1 snacks per day  Eating Behaviors: Consumption of high calorie/ high fat foods and Large portion sizes  Food allergies or intolerances: dislikes vegetables/salads, makes him want to vomit  Allergies   Allergen Reactions    Penicillins Hives     Cultural or Synagogue considerations: none    Uses metamucil powder twice daily  Takes multivitamin daily  Physical Assessment  Physical Activity  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  Trying to find time for the gym now that he switched to day shift  Right heel pain  Current physical limitations: back pain previous B/L shoulder surgeries  Psychosocial Assessment   Support systems: significant other children  Has 12yo and 15 yo sons  Socioeconomic factors: lives with miranda and his two teenaged boys  Rosalva Miller has 3 children who are there part-time  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[place]    Worked night shift for several years, 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     Nutrition Diagnosis  Diagnosis: Overweight / Obesity (NC-3 3), Excessive energy intake (NI-1 5) and Disordered eating pattern (NB-1 5)  Related to: Food and nutrition-related knowledge deficit, Physical inactivity and Excessive energy intake  As Evidenced by: BMI >25, Excessive energy intake and Unintentional weight gain     Nutrition Prescription: Recommend the following diet  Regular    Interventions and Teaching   Discussed pre-op and post-op nutrition guidelines  Patient educated and handouts provided  Surgical changes to stomach / GI  Capacity of post-surgery stomach  Diet progression  Adequate hydration  Sugar and fat restriction to decrease "dumping syndrome"  Expected weight loss  Weight loss plateaus/ possibility of weight regain  Exercise  Suggestions for pre-op diet  Nutrition considerations after surgery  Protein supplements  Meal planning and preparation  Appropriate carbohydrate, protein, and fat intake, and food/fluid choices to maximize safe weight loss, nutrient intake, and tolerance   Dietary and lifestyle changes  Possible problems with poor eating habits  Techniques for self monitoring and keeping daily food journal  Potential for food intolerance after surgery, and ways to deal with them including: lactose intolerance, nausea, reflux, vomiting, diarrhea, food intolerance, appetite changes, gas  Vitamin / Mineral supplementation of Multivitamin with minerals and Vitamin D    Education provided to: patient  Barriers to learning: No barriers identified  Readiness to change: contemplation and preparation  Prior research on procedure: discussed with provider  Comprehension: needs reinforcement and verbalizes understanding   Expected Compliance: good    Recommendations  Pt is an appropriate candidate for surgery  Yes  5% wt loss=15 775#=Day of surgery goal weight of 299 725#  93YSR=154 08#  Pt had CMP, TSH, A1c drawn 7/10/21  Ordered CBC, CMP, Lipids for October    Ordered nicotine blood test to be done in January 2022      Evaluation / Monitoring  Dietitian to Monitor: Eating pattern as discussed Tolerance of nutrition prescription Body weight Lab values Physical activity Bowel pattern    Goals  Eliminate sugar sweetened beverages, Food journal, Exercise 30 minutes 5 times per week, Complete lession plans 1-6, Eat 3 meals per day and Eliminate mindless snacking    Time Spent:   1 Hour

## 2021-09-13 NOTE — PROGRESS NOTES
Bariatric Behavioral Health Evaluation    Presenting Problem:  Jake Stauffer  is a 39 y o    male    :  1980   Patient presented with overall concerns of obesity  Stated that weight has impacted quality of life and concerned with lack of mobility, chronic pain, and overall health  Has attempted various weight loss plans in the past including fasting and reduced calories  Patient is Interested in exploring bariatric surgery  as an option for  weight loss goals  Is the patient seeking Bariatric Surgery Eval? Yes    Know one person post surgery, but not aware of the results  Realizes Post- Op Requirements? No - meeting next with dietitian to review  Pre-morbid level of function and history of present illness: No success in weight loss/  Yo yo dieting with loss and regain  Additional comments/STRESSORS related to family/relationships/peer SUPPORT :    Family are supportive  Work:  [employer]:    Currently Day shift  History of working night shift  18 yrs  Activity:  Has not returned to the gym yet  Aware of his steps  5K- 6K steps on an average  Lives with:    Two sons (16, 15)  Beatrice Shepherd and her children Part time  (12, 8, 7)   He does the cooking and shopping  Children involved in after school activities  Sports  Family Constellation (include relationship with each and Psych/Med HX)    Grew up with mother, father, brother and sister  Describes childhood as "average"  Psychiatric/Psychological Treatment Diagnosis: Nothing noted in chart  He denied  Outpatient Counselor No               Psychiatrist No                Have you had Inpatient Treatment?  No    Drug and/or Alcohol treatment history: none     Tobacco History:   Chewing tobacco      Domestic Violence No      Abuse History:  none reported          Physical/Psychological Assessment:              Appearance: appropriate           Sociability: average Affect: appropriate           Mood: calm           Thought Process: coherent           Speech: normal           Content: no impairment           Orientation: person  Yes , place  Yes , time  Yes , normal attention span  Yes , normal memory  Yes   and normal judgement  Yes            Insight: emotional  good      Risk Assessment:                Risk of Harm to Self or Others:   none noted during evaluation  No HI/SI                Observation: Interviews :  this interview only               Access to weapons : not reported                Based on the previous information, the client presents the following risk of harm to self or others: low      Recommendations: Recommended for surgery  yes       Note :  Patient presented for behavioral health evaluation for the bariatric program    Negative for Mental Health  Denied history of  therapy  Denied history of  Psychiatry  Denied history of  Drug and/or Alcohol abuse or treatment  Current  tobacco use (chewing tobacco)  Patient educated regarding the impact of nicotine and alcohol on the post surgery bariatric patient  Patient has a negative family history of tobacco and alcohol addiction  Patient meets criteria for surgery at this time and is referred to the bariatric surgeon  BERNADETTE Mcguire, [facility]  _____________________________      BARIATRIC SURGERY EDUCATION CHECKLIST     Education related to my bariatric surgery process:     Patients may be required to complete a psychiatric evaluation and receive clearance for surgery from their psychiatrist     Patients who undergo weight loss surgery are at higher risk of increased mental health concerns and suicide attempts  Patients may be required to complete a full substance abuse evaluation and then complete all  treatment recommendations prior to surgery      If diagnosis of abuse/dependence results, patient may be required to remain sober for one (1) year before having bariatric surgery  Patient's on psychiatric medications should check with their provider to discuss psychiatric medications and the changes in absorption  Patient should discuss all time release medications with provider and take all medications as prescribed  The recommendation is that there is no use of any tobacco products, Hookah or  vapes for the bariatric post-operation patient  Bariatric surgery patients should not consume alcohol as a post-operative patient as it may increase risk of numerous health conditions including but not limited to alcohol abuse and ulcers  There is a possibility of weight regain if patient does not follow all program guidelines and recommendations  Bariatric surgery patients should exercise thirty (30) to sixty (60) minutes per day to maintain post-surgical weight loss  Research indicates that bariatric patients are more successful when they see a therapist for up to two (2) years post-op  Patients will follow all medical and dietary recommendations provided  Patient will keep all scheduled appointments and follow up with their physician for a minimum of five (5) years  Patient will take all vitamins as recommended  Post-operative vitamins are life-long  There is a goal month set  All requirements should be met by this time  Don't wait to get started! There is a deadline month set  All requirements must be finished by this time and if not, the patient will be halted in the surgery process  The patient can be referred to the medical weight management program or can come back to the surgical program once the unfinished tasks from the previous program are completed

## 2021-09-15 ENCOUNTER — HOSPITAL ENCOUNTER (OUTPATIENT)
Dept: SLEEP CENTER | Facility: HOSPITAL | Age: 41
Discharge: HOME | End: 2021-09-15
Attending: INTERNAL MEDICINE
Payer: COMMERCIAL

## 2021-09-15 DIAGNOSIS — G47.33 OBSTRUCTIVE SLEEP APNEA: ICD-10-CM

## 2021-09-15 DIAGNOSIS — E66.01 MORBID OBESITY WITH BMI OF 40.0-44.9, ADULT (HCC): ICD-10-CM

## 2021-09-15 DIAGNOSIS — G47.10 HYPERSOMNOLENCE: ICD-10-CM

## 2021-09-15 PROCEDURE — G0399 HOME SLEEP TEST/TYPE 3 PORTA: HCPCS

## 2021-09-15 NOTE — PROGRESS NOTES
Home Sleep Study Documentation    Pre-Sleep Home Study:    Set-up and instructions performed by: COREY Yee    Technician performed demonstration for Patient: yes    Return demonstration performed by Patient: yes    Written instructions provided to Patient: yes    Patient signed consent form: yes        Post-Sleep Home Study:    Additional comments by Patient: none    Home Sleep Study Failed: no    Failure reason: N/A    Reported or Detected: N/A    Scored by: COREY Yee

## 2021-09-17 ENCOUNTER — TELEPHONE (OUTPATIENT)
Dept: SLEEP CENTER | Facility: CLINIC | Age: 41
End: 2021-09-17

## 2021-09-17 NOTE — TELEPHONE ENCOUNTER
Advised patient sleep study shows mild to moderate TARAH  APAP ordered  Patient scheduled for DME set up and compliance appointments   Appointment information e mailed to Chloe Hua

## 2021-09-20 ENCOUNTER — RA CDI HCC (OUTPATIENT)
Dept: OTHER | Facility: HOSPITAL | Age: 41
End: 2021-09-20

## 2021-09-27 ENCOUNTER — OFFICE VISIT (OUTPATIENT)
Dept: FAMILY MEDICINE CLINIC | Facility: CLINIC | Age: 41
End: 2021-09-27
Payer: COMMERCIAL

## 2021-09-27 VITALS
WEIGHT: 315 LBS | HEART RATE: 78 BPM | BODY MASS INDEX: 42.66 KG/M2 | SYSTOLIC BLOOD PRESSURE: 134 MMHG | HEIGHT: 72 IN | RESPIRATION RATE: 18 BRPM | DIASTOLIC BLOOD PRESSURE: 78 MMHG

## 2021-09-27 DIAGNOSIS — Z00.00 ANNUAL PHYSICAL EXAM: Primary | ICD-10-CM

## 2021-09-27 DIAGNOSIS — M79.672 LEFT FOOT PAIN: ICD-10-CM

## 2021-09-27 DIAGNOSIS — Z23 NEEDS FLU SHOT: ICD-10-CM

## 2021-09-27 PROCEDURE — 90471 IMMUNIZATION ADMIN: CPT | Performed by: INTERNAL MEDICINE

## 2021-09-27 PROCEDURE — 1036F TOBACCO NON-USER: CPT | Performed by: INTERNAL MEDICINE

## 2021-09-27 PROCEDURE — 3008F BODY MASS INDEX DOCD: CPT | Performed by: INTERNAL MEDICINE

## 2021-09-27 PROCEDURE — 90682 RIV4 VACC RECOMBINANT DNA IM: CPT | Performed by: INTERNAL MEDICINE

## 2021-09-27 PROCEDURE — 99396 PREV VISIT EST AGE 40-64: CPT | Performed by: INTERNAL MEDICINE

## 2021-09-27 NOTE — PROGRESS NOTES
ADULT ANNUAL [address] PRIMARY CARE    NAME: Abbie Riedel  AGE: 39 y o  SEX: male  : 1980     DATE: 2021     Assessment and Plan:     Problem List Items Addressed This Visit     None      Visit Diagnoses     Annual physical exam    -  Primary    Left foot pain        Relevant Orders    XR foot 3+ vw left    Needs flu shot        Relevant Orders    influenza vaccine, quadrivalent, recombinant, PF, 0 5 mL, for patients 18 yr+ (FLUBLOK)      Xray left foot  Awaiting cpap equipment   Increase water and protein in diet   Flu shot today  Labs ordered and upcoming for weight mgmt  Rto 6 months      Immunizations and preventive care screenings were discussed with patient today  Appropriate education was printed on patient's after visit summary  Counseling:  · Exercise: the importance of regular exercise/physical activity was discussed  Recommend exercise 3-5 times per week for at least 30 minutes  No follow-ups on file  Chief Complaint:     Chief Complaint   Patient presents with    Annual Exam      History of Present Illness:     Adult Annual Physical   Patient here for a comprehensive physical exam  The patient reports problems - Recent dx of negro and awaiting equipment and seeing weight mgmt as well   Diet and Physical Activity  · Diet/Nutrition: low calorie diet  · Exercise: no formal exercise  Depression Screening  PHQ-9 Depression Screening    PHQ-9:   Frequency of the following problems over the past two weeks:      Little interest or pleasure in doing things: 0 - not at all  Feeling down, depressed, or hopeless: 0 - not at all       General Health  · Sleep: gets 4-6 hours of sleep on average  · Hearing: normal - bilateral   · Vision: no vision problems and goes for regular eye exams  · Dental: regular dental visits          Health  · Symptoms include: none     Review of Systems:     Review of Systems   Constitutional: Negative for chills and fever  HENT: Negative  Respiratory: Negative for cough and shortness of breath  Cardiovascular: Negative for chest pain, palpitations and leg swelling  Gastrointestinal: Negative for abdominal distention and abdominal pain  Genitourinary: Negative for difficulty urinating, frequency and hematuria  Musculoskeletal: Negative for arthralgias  Neurological: Negative for dizziness, light-headedness and headaches  Psychiatric/Behavioral: Negative for sleep disturbance  The patient is not nervous/anxious         Past Medical History:     Past Medical History:   Diagnosis Date    Aortic valve cusp abnormality     "bicuspid aortic valve"    Hypertension       Past Surgical History:     Past Surgical History:   Procedure Laterality Date    ANTERIOR CRUCIATE LIGAMENT REPAIR Right     KNEE ARTHROSCOPY Right     OTHER SURGICAL HISTORY      SAE    NM SHLDR ARTHROSCOP,SURG,REPAIR,SLAP LESION Left 5/26/2016    Procedure: SHOULDER ARTHROSCOPY SLAP REPAIR;  Surgeon: Bowen Lemus MD;  [facility];  Service: Orthopedics    NM SHLDR ARTHROSCOP,SURG,REPAIR,SLAP LESION Right 11/26/2018    Procedure: Shoulder Arthroscopy with labral repair and biceps tenotomy;  Surgeon: Bowen Lemus MD;  [facility];  Service: Orthopedics    ROTATOR CUFF REPAIR Left     VASECTOMY        Family History:     Family History   Problem Relation Age of Onset    Hypertension Father     Prostate cancer Maternal Grandfather     Other Paternal Grandmother         Carotid Stenosis     Hyperlipidemia Paternal Grandmother     Hypertension Paternal Grandmother     Stroke Paternal Grandmother     Coronary artery disease Paternal Grandfather     Hyperlipidemia Paternal Grandfather     Hypertension Paternal Grandfather     Diabetes type II Paternal Grandfather     Breast cancer Mother     Thyroid disease Neg Hx       Social History:     Social History     Socioeconomic History    Marital status:      Spouse name: Not on file    Number of children: Not on file    Years of education: Not on file    Highest education level: Not on file   Occupational History    Not on file   Tobacco Use    Smoking status: Never Smoker    Smokeless tobacco: Current User   Vaping Use    Vaping Use: Never used   Substance and Sexual Activity    Alcohol use: Yes     Comment: rare    Drug use: No    Sexual activity: Yes   Other Topics Concern    Not on file   Social History Narrative    Always uses seat belt     Caffeine Use     Dental care regularly           Social Determinants of Health     Financial Resource Strain:     Difficulty of Paying Living Expenses:    Food Insecurity:     Worried About Running Out of Food in the Last Year:     [address] in the Last Year:    Transportation Needs:     Lack of Transportation (Medical):  Lack of Transportation (Non-Medical):    Physical Activity:     Days of Exercise per Week:     Minutes of Exercise per Session:    Stress:     Feeling of Stress :    Social Connections:     Frequency of Communication with Friends and Family:     Frequency of Social Gatherings with Friends and Family:     Attends Anglican Services:     Active Member of Clubs or Organizations:     Attends Club or Organization Meetings:     Marital Status:    Intimate Partner Violence:     Fear of Current or Ex-Partner:     Emotionally Abused:     Physically Abused:     Sexually Abused:       Current Medications:     Current Outpatient Medications   Medication Sig Dispense Refill    diltiazem (CARDIZEM CD) 180 mg 24 hr capsule Take 180 mg by mouth daily      lisinopril-hydrochlorothiazide (PRINZIDE,ZESTORETIC) 20-12 5 MG per tablet Take 2 tablets by mouth daily      potassium chloride (K-DUR,KLOR-CON) 20 mEq tablet Take 20 mEq by mouth daily       No current facility-administered medications for this visit  Allergies:      Allergies   Allergen Reactions    Penicillins Hives      Physical Exam:     /78   Pulse 78   Resp 18   Ht 6' 0 05" (1 83 m)   Wt (!) 147 kg (323 lb)   BMI 43 75 kg/m²     Physical Exam     Daphney Patton, DO  [address] [place] [address]

## 2021-09-27 NOTE — PATIENT INSTRUCTIONS

## 2021-10-08 ENCOUNTER — OFFICE VISIT (OUTPATIENT)
Dept: BARIATRICS | Facility: CLINIC | Age: 41
End: 2021-10-08
Payer: COMMERCIAL

## 2021-10-08 VITALS
TEMPERATURE: 97.6 F | DIASTOLIC BLOOD PRESSURE: 92 MMHG | HEART RATE: 62 BPM | WEIGHT: 309 LBS | BODY MASS INDEX: 40.95 KG/M2 | SYSTOLIC BLOOD PRESSURE: 130 MMHG | HEIGHT: 73 IN

## 2021-10-08 DIAGNOSIS — E66.01 MORBID (SEVERE) OBESITY DUE TO EXCESS CALORIES (HCC): Primary | ICD-10-CM

## 2021-10-08 PROCEDURE — 3008F BODY MASS INDEX DOCD: CPT | Performed by: SURGERY

## 2021-10-08 PROCEDURE — 1036F TOBACCO NON-USER: CPT | Performed by: SURGERY

## 2021-10-08 PROCEDURE — 99203 OFFICE O/P NEW LOW 30 MIN: CPT | Performed by: SURGERY

## 2021-10-14 ENCOUNTER — PREP FOR PROCEDURE (OUTPATIENT)
Dept: BARIATRICS | Facility: CLINIC | Age: 41
End: 2021-10-14

## 2021-10-14 DIAGNOSIS — E66.01 MORBID OBESITY WITH BMI OF 40.0-44.9, ADULT (HCC): Primary | ICD-10-CM

## 2021-10-28 ENCOUNTER — APPOINTMENT (OUTPATIENT)
Dept: RADIOLOGY | Facility: MEDICAL CENTER | Age: 41
End: 2021-10-28
Payer: COMMERCIAL

## 2021-10-28 DIAGNOSIS — M79.672 LEFT FOOT PAIN: ICD-10-CM

## 2021-10-28 PROCEDURE — 73630 X-RAY EXAM OF FOOT: CPT

## 2021-10-29 ENCOUNTER — TELEPHONE (OUTPATIENT)
Dept: SLEEP CENTER | Facility: CLINIC | Age: 41
End: 2021-10-29

## 2021-11-01 DIAGNOSIS — M79.673 PAIN OF FOOT, UNSPECIFIED LATERALITY: Primary | ICD-10-CM

## 2021-11-12 ENCOUNTER — OFFICE VISIT (OUTPATIENT)
Dept: BARIATRICS | Facility: CLINIC | Age: 41
End: 2021-11-12

## 2021-11-12 VITALS — WEIGHT: 315 LBS | BODY MASS INDEX: 42.66 KG/M2

## 2021-11-12 DIAGNOSIS — G47.33 OSA (OBSTRUCTIVE SLEEP APNEA): ICD-10-CM

## 2021-11-12 DIAGNOSIS — E66.01 MORBID OBESITY WITH BMI OF 40.0-44.9, ADULT (HCC): Primary | ICD-10-CM

## 2021-11-12 DIAGNOSIS — E66.01 MORBID OBESITY (HCC): ICD-10-CM

## 2021-11-12 PROCEDURE — RECHECK: Performed by: DIETITIAN, REGISTERED

## 2021-11-29 ENCOUNTER — TELEPHONE (OUTPATIENT)
Dept: FAMILY MEDICINE CLINIC | Facility: CLINIC | Age: 41
End: 2021-11-29

## 2021-12-09 ENCOUNTER — OFFICE VISIT (OUTPATIENT)
Dept: SLEEP CENTER | Facility: CLINIC | Age: 41
End: 2021-12-09
Payer: COMMERCIAL

## 2021-12-09 VITALS
DIASTOLIC BLOOD PRESSURE: 70 MMHG | SYSTOLIC BLOOD PRESSURE: 138 MMHG | WEIGHT: 315 LBS | OXYGEN SATURATION: 98 % | BODY MASS INDEX: 41.75 KG/M2 | TEMPERATURE: 97.8 F | HEIGHT: 73 IN | HEART RATE: 87 BPM

## 2021-12-09 DIAGNOSIS — J34.89 DRY NOSE: ICD-10-CM

## 2021-12-09 DIAGNOSIS — F51.12 INSUFFICIENT SLEEP SYNDROME: ICD-10-CM

## 2021-12-09 DIAGNOSIS — G47.26 SHIFT WORK SLEEP DISORDER: ICD-10-CM

## 2021-12-09 DIAGNOSIS — I10 ESSENTIAL HYPERTENSION: ICD-10-CM

## 2021-12-09 DIAGNOSIS — G47.33 OSA (OBSTRUCTIVE SLEEP APNEA): Primary | ICD-10-CM

## 2021-12-09 DIAGNOSIS — G47.10 HYPERSOMNOLENCE: ICD-10-CM

## 2021-12-09 DIAGNOSIS — Q23.1 BICUSPID AORTIC VALVE: ICD-10-CM

## 2021-12-09 DIAGNOSIS — I48.0 PAF (PAROXYSMAL ATRIAL FIBRILLATION) (HCC): ICD-10-CM

## 2021-12-09 DIAGNOSIS — E66.01 MORBID OBESITY WITH BMI OF 40.0-44.9, ADULT (HCC): ICD-10-CM

## 2021-12-09 PROCEDURE — 3008F BODY MASS INDEX DOCD: CPT | Performed by: INTERNAL MEDICINE

## 2021-12-09 PROCEDURE — 99214 OFFICE O/P EST MOD 30 MIN: CPT | Performed by: INTERNAL MEDICINE

## 2021-12-09 PROCEDURE — 1036F TOBACCO NON-USER: CPT | Performed by: INTERNAL MEDICINE

## 2021-12-10 ENCOUNTER — TELEPHONE (OUTPATIENT)
Dept: SLEEP CENTER | Facility: CLINIC | Age: 41
End: 2021-12-10

## 2021-12-17 ENCOUNTER — OFFICE VISIT (OUTPATIENT)
Dept: BARIATRICS | Facility: CLINIC | Age: 41
End: 2021-12-17

## 2021-12-17 VITALS — BODY MASS INDEX: 42.87 KG/M2 | WEIGHT: 315 LBS

## 2021-12-17 DIAGNOSIS — E66.01 MORBID OBESITY WITH BMI OF 40.0-44.9, ADULT (HCC): Primary | ICD-10-CM

## 2021-12-17 PROCEDURE — RECHECK

## 2022-01-14 ENCOUNTER — OFFICE VISIT (OUTPATIENT)
Dept: BARIATRICS | Facility: CLINIC | Age: 42
End: 2022-01-14

## 2022-01-14 VITALS — WEIGHT: 315 LBS | BODY MASS INDEX: 42.92 KG/M2

## 2022-01-14 DIAGNOSIS — E66.01 MORBID OBESITY WITH BMI OF 40.0-44.9, ADULT (HCC): Primary | ICD-10-CM

## 2022-01-14 PROCEDURE — RECHECK: Performed by: DIETITIAN, REGISTERED

## 2022-01-14 NOTE — PROGRESS NOTES
Bariatric Nutrition Follow-Up Note    Type of surgery    Preop 4 months: 4 of 4  Surgery Date: TBD- Tentative February 2022  Surgeon: Dr Petr Samaniego  (Deadline month June 2022)    Nutrition Assessment   Raven Polo  39 y o   male     Wt with BMI of 25: 188 6lbs  Pre-Op Excess Wt: 126 9lbs  Wt (!) 146 kg (320 lb 14 4 oz)   BMI 42 92 kg/m²    No weight change since last office visit  Net 5 4lb wt gain since starting surgical program   Net 5 1lb wt loss since first office visit 4/19/21  Pt now needs 21 175lb wt loss prior to surgery  Pre-op goal weight= 5% wt loss=15 775#=Day of surgery goal weight of 299 725#  [facility] Equation:     Weight maintenance= 2858 kcal;/day  Estimated calories for weight loss 0551-0764 kcal/day ( 1-2# per wk wt loss - sedentary )  Estimated protein needs 85 7-102 9g/day (1 0-1 2 gms/kg IBW )   Estimated fluid needs 2571-3000ml/day (30-35 ml/kg IBW )      Review of History and Medications   Past Medical History:   Diagnosis Date    Aortic valve cusp abnormality     "bicuspid aortic valve"    Hypertension      Past Surgical History:   Procedure Laterality Date    ANTERIOR CRUCIATE LIGAMENT REPAIR Right     KNEE ARTHROSCOPY Right     OTHER SURGICAL HISTORY      SAE    WI SHLDR ARTHROSCOP,SURG,REPAIR,SLAP LESION Left 5/26/2016    Procedure: SHOULDER ARTHROSCOPY SLAP REPAIR;  Surgeon: Coy Hager MD;  Location: [facility];  Service: Orthopedics    WI SHLDR ARTHROSCOP,SURG,REPAIR,SLAP LESION Right 11/26/2018    Procedure: Shoulder Arthroscopy with labral repair and biceps tenotomy;  Surgeon: Coy Hager MD;  [facility]: [facility];  Service: Orthopedics    ROTATOR CUFF REPAIR Left     VASECTOMY       Social History     Socioeconomic History    Marital status:       Spouse name: Not on file    Number of children: Not on file    Years of education: Not on file    Highest education level: Not on file   Occupational History    Not on file   Tobacco Use    Smoking status: Never Smoker    Smokeless tobacco: Current User   Vaping Use    Vaping Use: Never used   Substance and Sexual Activity    Alcohol use: Yes     Comment: rare    Drug use: No    Sexual activity: Yes   Other Topics Concern    Not on file   Social History Narrative    Always uses seat belt     Caffeine Use     Dental care regularly           Social Determinants of Health     Financial Resource Strain: Not on file   Food Insecurity: Not on file   Transportation Needs: Not on file   Physical Activity: Not on file   Stress: Not on file   Social Connections: Not on file   Intimate Partner Violence: Not on file   Housing Stability: Not on file       Current Outpatient Medications:     diltiazem (CARDIZEM CD) 180 mg 24 hr capsule, Take 180 mg by mouth daily, Disp: , Rfl:     lisinopril-hydrochlorothiazide (PRINZIDE,ZESTORETIC) 20-12 5 MG per tablet, Take 2 tablets by mouth daily, Disp: , Rfl:     potassium chloride (K-DUR,KLOR-CON) 20 mEq tablet, Take 20 mEq by mouth daily, Disp: , Rfl:      Food Intake and Lifestyle Assessment   Food Intake Assessment completed via usual diet recall  Wakes 5:30am  Leaves for work 7am  Breakfast: 8:00-8:30am: Premier Protein Shake  Snack: none   Lunch: packs dinner leftovers usually  Yesterday got meatball sub from [employer]  Snack: none  Home 5:00-5:30pm  Dinner:  Sometimes croc pot meals: chicken breast, maybe a vegetable or pot roast     Sometimes gets take-out: grilled cheese or pizza or subs or [place] cuisine  Sometimes makes quick burgers and fries or chicken tacos or lasagna, etc   Beverage intake: plain water daily  Sometimes black coffee  Pt reports he had one can of soda throughout the past month  Protein supplement: Pt has started Premier Protein drinks for breakfast daily  Estimated protein intake per day: 60-80g  Estimated fluid intake per day: 1 gallon water per day    Meals eaten away from home: Pt has decreased take-out dinner  Typical meal pattern: 3 meals per day and 0-1 snacks per day  Eating Behaviors: Pt has been decreasing his snacks at night  Pt has significantly decreased his takeout  Pt has decreased his portion sizes  Pt reports they got pizza one day last week and he ate two slices instead of his usual 4-5 slices  Pt reports he is doing well with 30/60 rule and taking daily multivitamin  Food allergies or intolerances: dislikes vegetables/salads, makes him want to vomit  Allergies   Allergen Reactions    Penicillins Hives     Cultural or Lutheran considerations: none    Uses metamucil powder twice daily  Takes multivitamin daily  Physical Assessment  Physical Activity  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  Trying to find time for the gym now that he switched to day shift  Still having difficulty finding time to dedicate to exercise in his day  Current physical limitations: back pain previous B/L shoulder surgeries, R heel pain  Psychosocial Assessment   Support systems: significant other children: has 14yo and 15 yo sons  Socioeconomic factors: lives with miranda and his two teenaged boys  Fly Ruiz has 3 children who are there part-time  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, 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  Nutrition Diagnosis-continued  Diagnosis: Overweight / Obesity (NC-3 3), Excessive energy intake (NI-1 5) and Disordered eating pattern (NB-1 5)  Related to: Food and nutrition-related knowledge deficit, Physical inactivity and Excessive energy intake  As Evidenced by: BMI >25, Excessive energy intake and Unintentional weight gain     Interventions and Teaching   Discussed pre-op and post-op nutrition guidelines  Patient educated and handouts provided  Adequate hydration:  Discussed importance of sipping liquids all day, not gulping or chugging  Discussed risk of post-op dehydration   Provided dehydration infographic handout  Exercise  Suggestions for pre-op diet  Meal planning and preparation  Dietary and lifestyle changes  Possible problems with poor eating habits  Potential for food intolerance after surgery, and ways to deal with them including: lactose intolerance, nausea, reflux, vomiting, diarrhea, food intolerance, appetite changes, gas  Vitamin / Mineral supplementation of Multivitamin with minerals and Vitamin D    Education provided to: patient  Barriers to learning: No barriers identified  Readiness to change: contemplation and preparation  Prior research on procedure: discussed with provider  Comprehension: needs reinforcement and verbalizes understanding   Expected Compliance: good    Recommendations  Pt is an appropriate candidate for surgery  Yes  5% wt loss=15 775#=Day of surgery goal weight of 299 725#  77XLG=943 08#  Pt had CMP, TSH, A1c drawn 7/10/21  Ordered CBC, CMP, Lipids for October  Ordered nicotine blood test to be done in January 2022  Evaluation / Monitoring  Dietitian to Monitor: Eating pattern as discussed Tolerance of nutrition prescription Body weight Lab values Physical activity Bowel pattern    Goals  Eliminate sugar sweetened beverages, Food journal, Exercise 30 minutes 5 times per week, Complete lession plans 1-6, Eat 3 meals per day and Eliminate mindless snacking    1)  Stop using chewing tobacco   2)  Schedule Cardiology risk assesment  3)  Get bloodwork done  Workflow:   Bloodwork: CMP, TSH, HgbA1c drawn 7/10/21  ordered CBC, CMP, Lipids   Support Group: discussed SG options  Pt reports he emailed the podcast quiz to  yesterday   Weight Loss: 5% wt loss=15 775#=Day of surgery goal weight of 299 725#    Nicotine test: pt still using chewing tobacco   Struggling with this   4 Month Pre-Operative Program: 4 of 4 today   EGD scheduled for 1/5/22  Rescheduled to 2/16/2022   Cardiac Risk Assessment: needs to schedule yet     Sleep Studies: had consult 10/28/21  Had F/U 12/23/21  Got CPAP      Time Spent:   30 minutes

## 2022-02-10 NOTE — PROGRESS NOTES
Behavioral Health Follow Up Note:      Smokeless tobacco use  Feb/ March:  Surgery goal  Britney:  Surgery deadline    5 / 4  Weight Check:  Dr Herbert Santos  Starting weight 315 5  #  Today's weight 326 4  #  Goal:  299#    Mental health and wellness - Stated he has too much going on right now and needs to 'take something off his plate'  Desires to switch to non surgical at this time and explore medications for weight loss          Progress toward program requirements    Workflow:  · Psych and/or D+A Clearance: N/a  · PCP Letter: 2/4/2022  · Support Group: 1/13/2022  · Surgeon Appt : 10/8/2021  · EGD : 2/16/2022  · Cardiac Risk Assessment: PENDING  · Sleep Studies: has CPAP  · Bloodwork: PENDING

## 2022-02-11 ENCOUNTER — OFFICE VISIT (OUTPATIENT)
Dept: BARIATRICS | Facility: CLINIC | Age: 42
End: 2022-02-11

## 2022-02-11 VITALS — WEIGHT: 315 LBS | BODY MASS INDEX: 43.66 KG/M2

## 2022-02-11 DIAGNOSIS — E66.01 MORBID OBESITY WITH BMI OF 40.0-44.9, ADULT (HCC): Primary | ICD-10-CM

## 2022-02-11 PROCEDURE — RECHECK

## 2022-02-15 ENCOUNTER — TELEPHONE (OUTPATIENT)
Dept: GASTROENTEROLOGY | Facility: HOSPITAL | Age: 42
End: 2022-02-15

## 2022-03-23 ENCOUNTER — RA CDI HCC (OUTPATIENT)
Dept: OTHER | Facility: HOSPITAL | Age: 42
End: 2022-03-23

## 2022-03-23 NOTE — PROGRESS NOTES
[facility]  coding opportunities          Chart Reviewed number of suggestions sent to Provider: 2   With the beginning of the new year, this is a reminder to address ALL [facility]  (risk adjustment) codes for 2022 as patient scores reset to zero JODI     I71 9 Aortic aneurysm of unspecified site, without rupture (HCC)  I48 0 PAF (paroxysmal atrial fibrillation) ([facility] )    Patients Insurance        Commercial Insurance: [address] [place] [address]

## 2022-03-30 ENCOUNTER — OFFICE VISIT (OUTPATIENT)
Dept: BARIATRICS | Facility: CLINIC | Age: 42
End: 2022-03-30
Payer: COMMERCIAL

## 2022-03-30 ENCOUNTER — OFFICE VISIT (OUTPATIENT)
Dept: FAMILY MEDICINE CLINIC | Facility: CLINIC | Age: 42
End: 2022-03-30
Payer: COMMERCIAL

## 2022-03-30 VITALS
SYSTOLIC BLOOD PRESSURE: 132 MMHG | HEIGHT: 72 IN | BODY MASS INDEX: 42.66 KG/M2 | WEIGHT: 315 LBS | HEART RATE: 78 BPM | DIASTOLIC BLOOD PRESSURE: 70 MMHG | RESPIRATION RATE: 18 BRPM

## 2022-03-30 VITALS
WEIGHT: 315 LBS | TEMPERATURE: 97.6 F | BODY MASS INDEX: 41.75 KG/M2 | HEIGHT: 73 IN | SYSTOLIC BLOOD PRESSURE: 142 MMHG | HEART RATE: 63 BPM | DIASTOLIC BLOOD PRESSURE: 86 MMHG

## 2022-03-30 DIAGNOSIS — I10 PRIMARY HYPERTENSION: Primary | ICD-10-CM

## 2022-03-30 DIAGNOSIS — I10 PRIMARY HYPERTENSION: ICD-10-CM

## 2022-03-30 DIAGNOSIS — E66.01 MORBID OBESITY WITH BMI OF 40.0-44.9, ADULT (HCC): ICD-10-CM

## 2022-03-30 DIAGNOSIS — E78.2 MIXED HYPERLIPIDEMIA: ICD-10-CM

## 2022-03-30 DIAGNOSIS — Q23.1 BICUSPID AORTIC VALVE: ICD-10-CM

## 2022-03-30 DIAGNOSIS — G47.33 OSA (OBSTRUCTIVE SLEEP APNEA): ICD-10-CM

## 2022-03-30 DIAGNOSIS — E66.01 MORBID OBESITY WITH BMI OF 40.0-44.9, ADULT (HCC): Primary | ICD-10-CM

## 2022-03-30 PROCEDURE — 3008F BODY MASS INDEX DOCD: CPT | Performed by: INTERNAL MEDICINE

## 2022-03-30 PROCEDURE — 1036F TOBACCO NON-USER: CPT | Performed by: PHYSICIAN ASSISTANT

## 2022-03-30 PROCEDURE — 99214 OFFICE O/P EST MOD 30 MIN: CPT | Performed by: INTERNAL MEDICINE

## 2022-03-30 PROCEDURE — 99214 OFFICE O/P EST MOD 30 MIN: CPT | Performed by: PHYSICIAN ASSISTANT

## 2022-03-30 NOTE — PROGRESS NOTES
Assessment/Plan:          Diagnoses and all orders for this visit:    Primary hypertension  Bp stable on current rx and pt recommitted to work on weight loss program - has appt today  He will be due for cardio followup by May     Mixed hyperlipidemia  He has rx for labs and will check if other labs needed after wt mgmt appt today then go for fbw    Morbid obesity with BMI of 40 0-44 9, adult ([address] )  Pt has appt today to get back on track with weight loss program - he is meeting with nonsurgical arm today and seems interested in starting meds if an option for him  Resume healthier diet and exercise as well    Bicuspid aortic valve  Pt stated he had to setup echo - he will be due by may for annual cardio appt       Rto 4 months      Patient ID: Nils Michael is a 39 y o  male  HPI  Pt ok and getting back omn track with weight loss program Has appt today as he got sidetracked by issues this past Fall He has gained weight and has not been as focused on healthy lifestyle but plans to get back on a  Regimen No regular exercise no cp or sob He did not get fasting labs as of yet No acute issues other than wants to work on weight loss preferably non surgical arm to start     Review of Systems   Constitutional: Positive for unexpected weight change  Negative for activity change, appetite change, chills and fever  HENT: Negative  Respiratory: Negative for cough and shortness of breath  Cardiovascular: Negative for chest pain, palpitations and leg swelling  Gastrointestinal: Negative  Genitourinary: Negative  Musculoskeletal: Positive for arthralgias  Chronic shoulder pain   Neurological: Negative for dizziness, light-headedness and headaches  Psychiatric/Behavioral: Negative for sleep disturbance  The patient is not nervous/anxious          Past Medical History:   Diagnosis Date    Aortic valve cusp abnormality     "bicuspid aortic valve"    Hypertension      Past Surgical History:   Procedure Laterality Date    ANTERIOR CRUCIATE LIGAMENT REPAIR Right     KNEE ARTHROSCOPY Right     OTHER SURGICAL HISTORY      SAE    ME SHLDR ARTHROSCOP,SURG,REPAIR,SLAP LESION Left 5/26/2016    Procedure: SHOULDER ARTHROSCOPY SLAP REPAIR;  Surgeon: Monroe Lloyd MD;  [facility]: [facility];  Service: Orthopedics    ME SHLDR ARTHROSCOP,SURG,REPAIR,SLAP LESION Right 11/26/2018    Procedure: Shoulder Arthroscopy with labral repair and biceps tenotomy;  Surgeon: Monroe Lloyd MD;  [facility]: [facility];  Service: Orthopedics    ROTATOR CUFF REPAIR Left     VASECTOMY       Social History     Socioeconomic History    Marital status:       Spouse name: Not on file    Number of children: Not on file    Years of education: Not on file    Highest education level: Not on file   Occupational History    Not on file   Tobacco Use    Smoking status: Never Smoker    Smokeless tobacco: Current User   Vaping Use    Vaping Use: Never used   Substance and Sexual Activity    Alcohol use: Yes     Comment: rare    Drug use: No    Sexual activity: Yes   Other Topics Concern    Not on file   Social History Narrative    Always uses seat belt     Caffeine Use     Dental care regularly           Social Determinants of Health     Financial Resource Strain: Not on file   Food Insecurity: Not on file   Transportation Needs: Not on file   Physical Activity: Not on file   Stress: Not on file   Social Connections: Not on file   Intimate Partner Violence: Not on file   Housing Stability: Not on file     Allergies   Allergen Reactions    Penicillins Hives           /70   Pulse 78   Resp 18   Ht 6' (1 829 m)   Wt (!) 153 kg (337 lb)   BMI 45 71 kg/m²          Physical Exam

## 2022-03-30 NOTE — ASSESSMENT & PLAN NOTE
Continue lisinopril/hctz and diltiazem  -should improve with weight loss, dietary, and lifestyle changes

## 2022-03-30 NOTE — ASSESSMENT & PLAN NOTE
-Discussed options of HealthyCORE-Intensive Lifestyle Intervention Program, Very Low Calorie Diet-VLCD and Conservative Program and the role of weight loss medications  He is no longer interested in surgery  -Initial weight loss goal of 5-10% weight loss for improved health    -Patient is interested in pursuing conservative program and interested in medication  He will check insurance coverage regarding saxenda  Patient denies personal and family history of MCT and MEN2 tumors  Patient denies personal history of pancreatitis  Avoid phentermine due to hypertension  Has failed wellbutrin in the past    Meal plan 4910-2170 calorie diet given  Discussed trying to eat something for breakfast   Recommend adding veggies to sandwiches and going light on spreads  Eating out planning also discussed

## 2022-03-30 NOTE — PROGRESS NOTES
Assessment/Plan: Morbid obesity with BMI of 40 0-44 9, adult [facility])  -Discussed options of HealthyCORE-Intensive Lifestyle Intervention Program, Very Low Calorie Diet-VLCD and Conservative Program and the role of weight loss medications  He is no longer interested in surgery  -Initial weight loss goal of 5-10% weight loss for improved health    -Patient is interested in pursuing conservative program and interested in medication  He will check insurance coverage regarding saxenda  Patient denies personal and family history of MCT and MEN2 tumors  Patient denies personal history of pancreatitis  Avoid phentermine due to hypertension  Has failed wellbutrin in the past    Meal plan 4450-8252 calorie diet given  Discussed trying to eat something for breakfast   Recommend adding veggies to sandwiches and going light on spreads  Eating out planning also discussed  TARAH (obstructive sleep apnea)  -encouraged continued use of CPAP machine  -may improve with 20-30% weight loss      Hypertension  Continue lisinopril/hctz and diltiazem  -should improve with weight loss, dietary, and lifestyle changes        Follow up in approximately 2 months with Non-Surgical Physician/Advanced Practitioner  Goals:  Food log (ie ) www myfitnesspal com,sparkpeople  com,loseit com,calorieking  com,etc  baritastic  No sugary beverages  At least 64oz of water daily  Increase physical activity by 10 minutes daily  Gradually increase physical activity to a goal of 5 days per week for 30 minutes of MODERATE intensity PLUS 2 days per week of FULL BODY resistance training    Diagnoses and all orders for this visit:    Morbid obesity with BMI of 40 0-44 9, adult (HCC)    TARAH (obstructive sleep apnea)    Primary hypertension    Other orders  -     Multiple Vitamins-Minerals (MULTIVITAMIN ADULT EXTRA C PO);  Take by mouth          Subjective:   Chief Complaint   Patient presents with    Consult       Patient ID: Yoly Moyer  is a 39 y o  male with excess weight/obesity here to pursue weight management  He has been seeing bariatric dietician because he was pursuing surgery but now is not the right time  He was on Wellbutrin years ago but it didn't help his weight  He has a friend on injectable and is interested in that  Past Medical History:   Diagnosis Date    Aortic valve cusp abnormality     "bicuspid aortic valve"    Hypertension        HPI:  Obesity/Excess Weight:  Severity: class III  Onset:  years    Modifiers: Diet and Exercise  Contributing factors: Insufficient Physical Activity and Insufficient time to make appropriate lifestyle changes  Associated symptoms: increased joint pain and decreased exercise capacity    Goals:250  Hydration:water 1 galloon min  1 monster energy coffee  Alcohol: 1 drink week max  Exercise:none  Occupation:police [employer]     Colonoscopy-Not applicable    B:Skips  L:leftovers or liberty sandwich  [place] on whole wheat  S:skips  D:chicken and rice cassarole  S:unhealthy snacks typically chips      The following portions of the patient's history were reviewed and updated as appropriate:   He  has a past medical history of Aortic valve cusp abnormality and Hypertension    He   Patient Active Problem List    Diagnosis Date Noted    TARAH (obstructive sleep apnea)     Aortic aneurysm of unspecified site, without rupture ([place] [address] ) 07/28/2021    Morbid obesity ([place] [address] ) 02/04/2021    Weight gain 11/05/2020    Visit for preventive health examination 04/24/2020    S/P shoulder surgery 10/23/2019    PAF (paroxysmal atrial fibrillation) ([place] [address] ) 05/20/2019    Heart palpitations 05/13/2019    Morbid obesity with BMI of 40 0-44 9, adult ([place] [address] ) 05/13/2019    Labral tear of shoulder, right, subsequent encounter 08/27/2018    Bicuspid aortic valve 11/22/2017    Hypertension 11/22/2017    SLAP tear of shoulder 05/26/2016    Hyperlipidemia 01/02/2015     He  has a past surgical history that includes Anterior cruciate ligament repair (Right); Vasectomy; Other surgical history; pr shldr arthroscop,surg,repair,slap lesion (Left, 5/26/2016); Knee arthroscopy (Right); Rotator cuff repair (Left); and pr shldr arthroscop,surg,repair,slap lesion (Right, 11/26/2018)  His family history includes Breast cancer in his mother; Coronary artery disease in his paternal grandfather; Diabetes type II in his paternal grandfather; Hyperlipidemia in his paternal grandfather and paternal grandmother; Hypertension in his father, paternal grandfather, and paternal grandmother; Other in his paternal grandmother; Prostate cancer in his maternal grandfather; Stroke in his paternal grandmother  He  reports that he has never smoked  He uses smokeless tobacco  He reports current alcohol use  He reports that he does not use drugs  Current Outpatient Medications   Medication Sig Dispense Refill    diltiazem (CARDIZEM CD) 180 mg 24 hr capsule Take 180 mg by mouth daily      lisinopril-hydrochlorothiazide (PRINZIDE,ZESTORETIC) 20-12 5 MG per tablet Take 2 tablets by mouth daily      Multiple Vitamins-Minerals (MULTIVITAMIN ADULT EXTRA C PO) Take by mouth      potassium chloride (K-DUR,KLOR-CON) 20 mEq tablet Take 20 mEq by mouth daily       No current facility-administered medications for this visit  Current Outpatient Medications on File Prior to Visit   Medication Sig    diltiazem (CARDIZEM CD) 180 mg 24 hr capsule Take 180 mg by mouth daily    lisinopril-hydrochlorothiazide (PRINZIDE,ZESTORETIC) 20-12 5 MG per tablet Take 2 tablets by mouth daily    Multiple Vitamins-Minerals (MULTIVITAMIN ADULT EXTRA C PO) Take by mouth    potassium chloride (K-DUR,KLOR-CON) 20 mEq tablet Take 20 mEq by mouth daily     No current facility-administered medications on file prior to visit  He is allergic to penicillins       Review of Systems   Constitutional: Negative for fatigue  HENT: Negative for sore throat      Respiratory: Negative for shortness of breath  Cardiovascular: Negative for chest pain and palpitations  Gastrointestinal: Negative for abdominal pain, constipation and diarrhea  -GERD     Endocrine: Negative for cold intolerance and heat intolerance  Genitourinary: Negative for difficulty urinating  Musculoskeletal: Positive for back pain  Negative for arthralgias  Skin: Negative for rash  Neurological: Negative for headaches  Psychiatric/Behavioral: Negative for dysphoric mood  The patient is not nervous/anxious  Objective:    /86 (BP Location: Left arm, Patient Position: Sitting, Cuff Size: Standard)   Pulse 63   Temp 97 6 °F (36 4 °C) (Tympanic)   Ht 6' 0 5" (1 842 m)   Wt (!) 152 kg (334 lb 3 2 oz)   BMI 44 70 kg/m²     Physical Exam  Vitals and nursing note reviewed  Constitutional:       General: He is not in acute distress  Appearance: He is well-developed  He is obese  HENT:      Head: Normocephalic and atraumatic  Eyes:      Conjunctiva/sclera: Conjunctivae normal    Neck:      Thyroid: No thyromegaly  Pulmonary:      Effort: Pulmonary effort is normal  No respiratory distress  Skin:     Findings: No rash (visible)  Neurological:      Mental Status: He is alert and oriented to person, place, and time     Psychiatric:         Behavior: Behavior normal

## 2022-03-30 NOTE — PATIENT INSTRUCTIONS
Send a message regarding saxenda coverage  Food log (ie ) www myfitnesspal com,sparkpeople  com,loseit com,calorieking  com,etc  baritastic  No sugary beverages  At least 64oz of water daily  Increase physical activity by 10 minutes daily   Gradually increase physical activity to a goal of 5 days per week for 30 minutes of MODERATE intensity PLUS 2 days per week of FULL BODY resistance training  Recommend 2000 calories

## 2022-05-17 ENCOUNTER — OFFICE VISIT (OUTPATIENT)
Dept: BARIATRICS | Facility: CLINIC | Age: 42
End: 2022-05-17
Payer: COMMERCIAL

## 2022-05-17 VITALS
SYSTOLIC BLOOD PRESSURE: 136 MMHG | BODY MASS INDEX: 42.66 KG/M2 | HEART RATE: 92 BPM | DIASTOLIC BLOOD PRESSURE: 98 MMHG | WEIGHT: 315 LBS | RESPIRATION RATE: 16 BRPM | HEIGHT: 72 IN

## 2022-05-17 DIAGNOSIS — G47.33 OSA (OBSTRUCTIVE SLEEP APNEA): ICD-10-CM

## 2022-05-17 DIAGNOSIS — E66.01 MORBID OBESITY (HCC): ICD-10-CM

## 2022-05-17 DIAGNOSIS — I10 PRIMARY HYPERTENSION: ICD-10-CM

## 2022-05-17 DIAGNOSIS — E66.01 MORBID OBESITY WITH BMI OF 40.0-44.9, ADULT (HCC): Primary | ICD-10-CM

## 2022-05-17 PROCEDURE — 99214 OFFICE O/P EST MOD 30 MIN: CPT | Performed by: PHYSICIAN ASSISTANT

## 2022-05-17 PROCEDURE — 1036F TOBACCO NON-USER: CPT | Performed by: PHYSICIAN ASSISTANT

## 2022-05-17 PROCEDURE — 3008F BODY MASS INDEX DOCD: CPT | Performed by: PHYSICIAN ASSISTANT

## 2022-05-17 PROCEDURE — 3725F SCREEN DEPRESSION PERFORMED: CPT | Performed by: PHYSICIAN ASSISTANT

## 2022-05-17 NOTE — ASSESSMENT & PLAN NOTE
Recommend monitoring BP at home and if over 140/80 to contact cardiology    Stopped diltiazem and currently only on lisinopril/hctz

## 2022-05-17 NOTE — PROGRESS NOTES
Assessment/Plan: Morbid obesity ([facility] )  -Patient is pursuing conservative program  -Initial weight loss goal of 5-10% weight loss for improved health  -Screening labs Jul 2021- CMP and lipid panel ordered for follow up     Initial:334  Current:319  Change:-15  Goal:    -Patient is interested in pursuing conservative program and interested in medication  -Currently on Saxenda and doing well, 4 5% weight loss  Start weight 334 on 4/26/22  Patient denies personal and family history of MCT and MEN2 tumors  Patient denies personal history of pancreatitis  -Avoid phentermine due to hypertension  Has failed wellbutrin in the past    Meal plan 1883-2705 calorie diet given  Discussed trying to eat something for breakfast   Recommend adding veggies to sandwiches and going light on spreads  Eating out planning also discussed     -discussed trying to add in exercise and plans to start this summer    Hypertension  Recommend monitoring BP at home and if over 140/80 to contact cardiology  Stopped diltiazem and currently only on lisinopril/hctz        Follow up in approximately 3 months with Non-Surgical Physician/Advanced Practitioner  Diagnoses and all orders for this visit:    Morbid obesity with BMI of 40 0-44 9, adult ([place] [address] )  -     Comprehensive metabolic panel; Future  -     Lipid panel; Future    TARAH (obstructive sleep apnea)  -     Comprehensive metabolic panel; Future  -     Lipid panel; Future    Morbid obesity ([place] [address] )    Primary hypertension          Subjective:   Chief Complaint   Patient presents with    Follow-up     MWM 2mth f/u        Patient ID: Papo Turcios  is a 43 y o  male with excess weight/obesity here to pursue weight managment  Patient is pursuing Conservative Program      HPI    He is currenly on 1 2 mg of saxenda  He has been  Doing well and denies any side effects  It has decreased his appetite and helped with portion control  He was having swelling in the feet and ankles    He Patient informed to decrease Basaglar to 15 units at bedtime  Patient verbally understands and will send log in again in 2 weeks    Medication list updated stopped diltiazem and it went away  He is due for lisinopril/hctz now      Food logging:  Increased appetite/cravings:no  Fruit/Vegetable servings:at least 2  Exercise:none  Hydration:1 gallon water  Diet Recall  B:Egg whites and salsa  L:leftovers or sandwich  D:protein and veggies    S: has stopped       Colonoscopy-Not applicable    The following portions of the patient's history were reviewed and updated as appropriate: He  has a past medical history of Aortic valve cusp abnormality and Hypertension  He   Patient Active Problem List    Diagnosis Date Noted    TARAH (obstructive sleep apnea)     Aortic aneurysm of unspecified site, without rupture ([place] [address] ) 07/28/2021    Morbid obesity ([place] [address] ) 02/04/2021    Weight gain 11/05/2020    Visit for preventive health examination 04/24/2020    S/P shoulder surgery 10/23/2019    PAF (paroxysmal atrial fibrillation) ([place] [address] ) 05/20/2019    Heart palpitations 05/13/2019    Morbid obesity with BMI of 40 0-44 9, adult ([place] [address] ) 05/13/2019    Labral tear of shoulder, right, subsequent encounter 08/27/2018    Bicuspid aortic valve 11/22/2017    Hypertension 11/22/2017    SLAP tear of shoulder 05/26/2016    Hyperlipidemia 01/02/2015     He  has a past surgical history that includes Anterior cruciate ligament repair (Right); Vasectomy; Other surgical history; pr jasson arthroscop,surg,repair,slap lesion (Left, 5/26/2016); Knee arthroscopy (Right); Rotator cuff repair (Left); and pr nellier arthroscop,surg,repair,slap lesion (Right, 11/26/2018)  His family history includes Breast cancer in his mother; Coronary artery disease in his paternal grandfather; Diabetes type II in his paternal grandfather; Hyperlipidemia in his paternal grandfather and paternal grandmother; Hypertension in his father, paternal grandfather, and paternal grandmother; Other in his paternal grandmother; Prostate cancer in his maternal grandfather; Stroke in his paternal grandmother    He  reports that he has never smoked  He uses smokeless tobacco  He reports current alcohol use  He reports that he does not use drugs  Current Outpatient Medications   Medication Sig Dispense Refill    Insulin Pen Needle (Pen Needles 3/16") 31G X 5 MM MISC Use in the morning 100 each 0    liraglutide (SAXENDA) injection Inject subcutaneously WEEK 1 use 0 6mg day,  WEEK 2 use 1 2mg day, WEEK 3 use 1 8mg day, WEEK 4 use 2 4mg day, WEEK 5 use 3mg day 45 mL 0    lisinopril-hydrochlorothiazide (PRINZIDE,ZESTORETIC) 20-12 5 MG per tablet Take 2 tablets by mouth daily      Multiple Vitamins-Minerals (MULTIVITAMIN ADULT EXTRA C PO) Take by mouth      potassium chloride (K-DUR,KLOR-CON) 20 mEq tablet Take 20 mEq by mouth daily      diltiazem (CARDIZEM CD) 180 mg 24 hr capsule Take 180 mg by mouth daily       No current facility-administered medications for this visit  Current Outpatient Medications on File Prior to Visit   Medication Sig    Insulin Pen Needle (Pen Needles 3/16") 31G X 5 MM MISC Use in the morning    liraglutide (SAXENDA) injection Inject subcutaneously WEEK 1 use 0 6mg day,  WEEK 2 use 1 2mg day, WEEK 3 use 1 8mg day, WEEK 4 use 2 4mg day, WEEK 5 use 3mg day    lisinopril-hydrochlorothiazide (PRINZIDE,ZESTORETIC) 20-12 5 MG per tablet Take 2 tablets by mouth daily    Multiple Vitamins-Minerals (MULTIVITAMIN ADULT EXTRA C PO) Take by mouth    potassium chloride (K-DUR,KLOR-CON) 20 mEq tablet Take 20 mEq by mouth daily    diltiazem (CARDIZEM CD) 180 mg 24 hr capsule Take 180 mg by mouth daily     No current facility-administered medications on file prior to visit  He is allergic to penicillins       Review of Systems   Constitutional: Positive for fatigue  Negative for chills and fever  Eyes: Negative for pain and visual disturbance  Respiratory: Negative for cough and shortness of breath  Cardiovascular: Negative for chest pain, palpitations and leg swelling     Gastrointestinal: Negative for abdominal pain, constipation, nausea and vomiting  Genitourinary: Negative for difficulty urinating  Skin: Negative for color change and rash  Neurological: Negative for dizziness and headaches  All other systems reviewed and are negative  Objective:    /98   Pulse 92   Resp 16   Ht 6' 0 25" (1 835 m)   Wt (!) 145 kg (319 lb)   BMI 42 97 kg/m²      Physical Exam  Vitals and nursing note reviewed  Constitutional:       General: He is not in acute distress  Appearance: He is well-developed  He is obese  HENT:      Head: Normocephalic and atraumatic  Eyes:      Conjunctiva/sclera: Conjunctivae normal    Pulmonary:      Effort: Pulmonary effort is normal  No respiratory distress  Skin:     Findings: No rash (visible)  Neurological:      Mental Status: He is alert and oriented to person, place, and time     Psychiatric:         Behavior: Behavior normal

## 2022-05-17 NOTE — ASSESSMENT & PLAN NOTE
-Patient is pursuing conservative program  -Initial weight loss goal of 5-10% weight loss for improved health  -Screening labs Jul 2021- CMP and lipid panel ordered for follow up     Initial:334  Current:319  Change:-15  Goal:    -Patient is interested in pursuing conservative program and interested in medication  -Currently on Saxenda and doing well, 4 5% weight loss  Start weight 334 on 4/26/22  Patient denies personal and family history of MCT and MEN2 tumors  Patient denies personal history of pancreatitis  -Avoid phentermine due to hypertension  Has failed wellbutrin in the past    Meal plan 8536-3606 calorie diet given  Discussed trying to eat something for breakfast   Recommend adding veggies to sandwiches and going light on spreads    Eating out planning also discussed     -discussed trying to add in exercise and plans to start this summer

## 2022-07-28 ENCOUNTER — OFFICE VISIT (OUTPATIENT)
Dept: FAMILY MEDICINE CLINIC | Facility: CLINIC | Age: 42
End: 2022-07-28
Payer: COMMERCIAL

## 2022-07-28 VITALS
DIASTOLIC BLOOD PRESSURE: 80 MMHG | WEIGHT: 312 LBS | HEART RATE: 70 BPM | BODY MASS INDEX: 42.26 KG/M2 | SYSTOLIC BLOOD PRESSURE: 136 MMHG | HEIGHT: 72 IN | RESPIRATION RATE: 18 BRPM

## 2022-07-28 DIAGNOSIS — Q23.1 BICUSPID AORTIC VALVE: ICD-10-CM

## 2022-07-28 DIAGNOSIS — I10 PRIMARY HYPERTENSION: Primary | ICD-10-CM

## 2022-07-28 DIAGNOSIS — I48.0 PAF (PAROXYSMAL ATRIAL FIBRILLATION) (HCC): ICD-10-CM

## 2022-07-28 DIAGNOSIS — E66.01 MORBID OBESITY (HCC): ICD-10-CM

## 2022-07-28 PROBLEM — R63.5 WEIGHT GAIN: Status: RESOLVED | Noted: 2020-11-05 | Resolved: 2022-07-28

## 2022-07-28 PROCEDURE — 99214 OFFICE O/P EST MOD 30 MIN: CPT | Performed by: INTERNAL MEDICINE

## 2022-07-28 NOTE — PROGRESS NOTES
Assessment/Plan:         Diagnoses and all orders for this visit:    Primary hypertension  -     Ambulatory Referral to Cardiology; Future  Pt stopped Diltiazem due to swelling in past few months Bp top normal so he wants to change providers to be more local - referral requested to Dr john    Morbid obesity [facility])  He is getting back on track with nonsurgical weight mgmt and has appt upcoming He will get labs prior He is on injectable rx and seems to be tolerating aside from local reaction to injection    Bicuspid aortic valve  -     Ambulatory Referral to Cardiology; Future  PT asxs but due for followup He requested change to Dr john    PAF (paroxysmal atrial fibrillation) [facility])  -     Ambulatory Referral to Cardiology; Future  Pt stopped diltiazem due to lower leg swelling No sxs noted but overdue for cardiac followup  Stay hydrated Limit caffeine        Rto 3 months     Patient ID: Ankita Bear is a 43 y o  male  HPI  Pt doing ok He is getting back on track thru nonsurgical weight mgmt and hopes to pursue that line for the time being He is on injectable rx and doing ok Trying to be more active and improve diet Stress at home and trying to figure out how to move forward as he may have some changes ahead but seems to be prepared/making plan for such No chest pain or sob he did stop his diltiazem since he noted swelling and though contributing to weight as well No palpitations, no sxs noted since off the rx He does drink water and stay hydrated He would like to change to Dr Alessandra Irby for cardiology since it is closer - he is overdue for followup       Review of Systems   Constitutional: Negative for chills and fever  HENT: Negative  Eyes: Negative for visual disturbance  Respiratory: Negative for cough and shortness of breath  Cardiovascular: Negative for chest pain, palpitations and leg swelling  Gastrointestinal: Negative  Genitourinary: Negative      Musculoskeletal: Positive for arthralgias  Neurological: Negative for dizziness, light-headedness and headaches  Psychiatric/Behavioral: Negative for sleep disturbance  The patient is not nervous/anxious  Past Medical History:   Diagnosis Date    Aortic valve cusp abnormality     "bicuspid aortic valve"    Hypertension      Past Surgical History:   Procedure Laterality Date    ANTERIOR CRUCIATE LIGAMENT REPAIR Right     KNEE ARTHROSCOPY Right     OTHER SURGICAL HISTORY      SAE    CO SHLDR ARTHROSCOP,SURG,REPAIR,SLAP LESION Left 5/26/2016    Procedure: SHOULDER ARTHROSCOPY SLAP REPAIR;  Surgeon: Venkat Sun MD;  Location: [facility];  Service: Orthopedics    CO SHLDR ARTHROSCOP,SURG,REPAIR,SLAP LESION Right 11/26/2018    Procedure: Shoulder Arthroscopy with labral repair and biceps tenotomy;  Surgeon: Venkat Sun MD;  Location: [facility];  Service: Orthopedics    ROTATOR CUFF REPAIR Left     VASECTOMY       Social History     Socioeconomic History    Marital status:      Spouse name: Not on file    Number of children: Not on file    Years of education: Not on file    Highest education level: Not on file   Occupational History    Not on file   Tobacco Use    Smoking status: Never Smoker    Smokeless tobacco: Current User   Vaping Use    Vaping Use: Never used   Substance and Sexual Activity    Alcohol use: Yes     Comment: rare    Drug use: No    Sexual activity: Yes   Other Topics Concern    Not on file   Social History Narrative    Always uses seat belt     Caffeine Use     Dental care regularly               Allergies   Allergen Reactions    Penicillins Hives             Visit Vitals  /80   Pulse 70   Resp 18   Ht 6' (1 829 m)   Wt (!) 142 kg (312 lb)   BMI 42 31 kg/m²   Smoking Status Never Smoker   BSA 2 58 m²            Physical Exam  Vitals reviewed  Constitutional:       General: He is not in acute distress  Appearance: Normal appearance   He is not ill-appearing, toxic-appearing or diaphoretic  HENT:      Head: Normocephalic and atraumatic  Right Ear: External ear normal       Left Ear: External ear normal       Nose: Nose normal       Mouth/Throat:      Mouth: Mucous membranes are dry  Eyes:      General: No scleral icterus  Extraocular Movements: Extraocular movements intact  Conjunctiva/sclera: Conjunctivae normal       Pupils: Pupils are equal, round, and reactive to light  Cardiovascular:      Rate and Rhythm: Normal rate and regular rhythm  Pulses: Normal pulses  Heart sounds: Normal heart sounds  Pulmonary:      Effort: Pulmonary effort is normal  No respiratory distress  Breath sounds: Normal breath sounds  No wheezing  Abdominal:      General: Bowel sounds are normal  There is no distension  Palpations: Abdomen is soft  Tenderness: There is no abdominal tenderness  Musculoskeletal:         General: Normal range of motion  Cervical back: Normal range of motion and neck supple  No rigidity  Right lower leg: No edema  Left lower leg: No edema  Lymphadenopathy:      Cervical: No cervical adenopathy  Skin:     General: Skin is dry  Coloration: Skin is not jaundiced or pale  Neurological:      General: No focal deficit present  Mental Status: He is alert and oriented to person, place, and time  Mental status is at baseline  Cranial Nerves: No cranial nerve deficit  Sensory: No sensory deficit  Psychiatric:         Mood and Affect: Mood normal          Behavior: Behavior normal          Thought Content:  Thought content normal          Judgment: Judgment normal

## 2022-08-10 ENCOUNTER — APPOINTMENT (OUTPATIENT)
Dept: LAB | Facility: MEDICAL CENTER | Age: 42
End: 2022-08-10
Payer: COMMERCIAL

## 2022-08-10 DIAGNOSIS — R63.5 WEIGHT GAIN: ICD-10-CM

## 2022-08-10 DIAGNOSIS — I10 ESSENTIAL HYPERTENSION: ICD-10-CM

## 2022-08-10 DIAGNOSIS — Z72.0 NICOTINE ABUSE: ICD-10-CM

## 2022-08-10 DIAGNOSIS — Z01.818 PREOPERATIVE CLEARANCE: ICD-10-CM

## 2022-08-10 DIAGNOSIS — E78.5 HYPERLIPIDEMIA: ICD-10-CM

## 2022-08-10 DIAGNOSIS — G47.33 OSA (OBSTRUCTIVE SLEEP APNEA): ICD-10-CM

## 2022-08-10 DIAGNOSIS — Z01.812 BLOOD TESTS PRIOR TO TREATMENT OR PROCEDURE: ICD-10-CM

## 2022-08-10 DIAGNOSIS — E66.01 MORBID OBESITY WITH BMI OF 40.0-44.9, ADULT (HCC): ICD-10-CM

## 2022-08-10 LAB
ALBUMIN SERPL BCP-MCNC: 3.9 G/DL (ref 3.5–5)
ALP SERPL-CCNC: 47 U/L (ref 46–116)
ALT SERPL W P-5'-P-CCNC: 26 U/L (ref 12–78)
ANION GAP SERPL CALCULATED.3IONS-SCNC: 4 MMOL/L (ref 4–13)
AST SERPL W P-5'-P-CCNC: 16 U/L (ref 5–45)
BASOPHILS # BLD AUTO: 0.07 THOUSANDS/ΜL (ref 0–0.1)
BASOPHILS NFR BLD AUTO: 1 % (ref 0–1)
BILIRUB SERPL-MCNC: 0.97 MG/DL (ref 0.2–1)
BUN SERPL-MCNC: 17 MG/DL (ref 5–25)
CALCIUM SERPL-MCNC: 9.3 MG/DL (ref 8.3–10.1)
CHLORIDE SERPL-SCNC: 104 MMOL/L (ref 96–108)
CHOLEST SERPL-MCNC: 174 MG/DL
CO2 SERPL-SCNC: 27 MMOL/L (ref 21–32)
CREAT SERPL-MCNC: 1.25 MG/DL (ref 0.6–1.3)
EOSINOPHIL # BLD AUTO: 0.14 THOUSAND/ΜL (ref 0–0.61)
EOSINOPHIL NFR BLD AUTO: 2 % (ref 0–6)
ERYTHROCYTE [DISTWIDTH] IN BLOOD BY AUTOMATED COUNT: 12.8 % (ref 11.6–15.1)
GFR SERPL CREATININE-BSD FRML MDRD: 70 ML/MIN/1.73SQ M
GLUCOSE P FAST SERPL-MCNC: 85 MG/DL (ref 65–99)
HCT VFR BLD AUTO: 45.8 % (ref 36.5–49.3)
HDLC SERPL-MCNC: 38 MG/DL
HGB BLD-MCNC: 15.3 G/DL (ref 12–17)
IMM GRANULOCYTES # BLD AUTO: 0.04 THOUSAND/UL (ref 0–0.2)
IMM GRANULOCYTES NFR BLD AUTO: 0 % (ref 0–2)
LDLC SERPL CALC-MCNC: 101 MG/DL (ref 0–100)
LYMPHOCYTES # BLD AUTO: 2.33 THOUSANDS/ΜL (ref 0.6–4.47)
LYMPHOCYTES NFR BLD AUTO: 25 % (ref 14–44)
MCH RBC QN AUTO: 29.4 PG (ref 26.8–34.3)
MCHC RBC AUTO-ENTMCNC: 33.4 G/DL (ref 31.4–37.4)
MCV RBC AUTO: 88 FL (ref 82–98)
MONOCYTES # BLD AUTO: 0.84 THOUSAND/ΜL (ref 0.17–1.22)
MONOCYTES NFR BLD AUTO: 9 % (ref 4–12)
NEUTROPHILS # BLD AUTO: 5.85 THOUSANDS/ΜL (ref 1.85–7.62)
NEUTS SEG NFR BLD AUTO: 63 % (ref 43–75)
NRBC BLD AUTO-RTO: 0 /100 WBCS
PLATELET # BLD AUTO: 276 THOUSANDS/UL (ref 149–390)
PMV BLD AUTO: 10.9 FL (ref 8.9–12.7)
POTASSIUM SERPL-SCNC: 3.9 MMOL/L (ref 3.5–5.3)
PROT SERPL-MCNC: 7.3 G/DL (ref 6.4–8.4)
RBC # BLD AUTO: 5.21 MILLION/UL (ref 3.88–5.62)
SODIUM SERPL-SCNC: 135 MMOL/L (ref 135–147)
TRIGL SERPL-MCNC: 173 MG/DL
WBC # BLD AUTO: 9.27 THOUSAND/UL (ref 4.31–10.16)

## 2022-08-10 PROCEDURE — 85025 COMPLETE CBC W/AUTO DIFF WBC: CPT

## 2022-08-10 PROCEDURE — 80053 COMPREHEN METABOLIC PANEL: CPT

## 2022-08-10 PROCEDURE — 36415 COLL VENOUS BLD VENIPUNCTURE: CPT

## 2022-08-10 PROCEDURE — 80061 LIPID PANEL: CPT

## 2022-08-15 ENCOUNTER — OFFICE VISIT (OUTPATIENT)
Dept: BARIATRICS | Facility: CLINIC | Age: 42
End: 2022-08-15
Payer: COMMERCIAL

## 2022-08-15 VITALS
WEIGHT: 312.2 LBS | HEART RATE: 62 BPM | DIASTOLIC BLOOD PRESSURE: 82 MMHG | BODY MASS INDEX: 41.38 KG/M2 | TEMPERATURE: 97 F | HEIGHT: 73 IN | SYSTOLIC BLOOD PRESSURE: 124 MMHG

## 2022-08-15 DIAGNOSIS — E66.01 MORBID OBESITY WITH BMI OF 40.0-44.9, ADULT (HCC): ICD-10-CM

## 2022-08-15 PROCEDURE — 99214 OFFICE O/P EST MOD 30 MIN: CPT | Performed by: PHYSICIAN ASSISTANT

## 2022-08-15 NOTE — PATIENT INSTRUCTIONS

## 2022-08-15 NOTE — PROGRESS NOTES
Assessment/Plan: Morbid obesity with BMI of 40 0-44 9, adult [facility])  -Patient is pursuing conservative program  -Initial weight loss goal of 5-10% weight loss for improved health  -Avoid phentermine due to hypertension  Has failed wellbutrin in the past   -Currently on Saxenda and doing well, 4 5% weight loss  Start weight 334 on 4/26/22  Continues with injection site reaction  Switch to [facility]JIMENA BRADLEY  Patient denies personal and family history of MCT and MEN2 tumors  Patient denies personal history of pancreatitis    -Screening labs- CMP and lipid panel reviewed from 8/10/22   -Does not enjoy most fruits and vegetables- tries to add them when he can  -Reviewed whole grains over refined and fiber     Initial:334  Current:312  Change:-22  Goal: 250    Follow up in approximately 4 months with Non-Surgical Physician/Advanced Practitioner  Goals:  Food log (ie ) www myfitnesspal com,sparkpeople  com,loseit com,calorieking  com,etc  baritastic  No sugary beverages  At least 64oz of water daily  Increase physical activity by 10 minutes daily  Gradually increase physical activity to a goal of 5 days per week for 30 minutes of MODERATE intensity PLUS 2 days per week of FULL BODY resistance training  5-10 servings of fruits and vegetables per day and 25-35 grams of dietary fiber per day, gradually increasing   If choosing starch pick whole grain/complex carbs and keep to 1/2 cup: oatmeal, brown rice, quinoa, whole wheat pasta, potatoes, sweet potato, chickpea noodles, red lentil noodles  Measure all portions: creamers, sugars, cooking oils/butters, condiments, dressings, etc and log calories     Diagnoses and all orders for this visit:    Morbid obesity with BMI of 40 0-44 9, adult ([facility] [facility] )  -     Semaglutide-Weight Management (WEGOVY) 1 7 MG/0 75ML; Inject 0 75 mL (1 7 mg total) under the skin once a week For one month then increase to 2 4 mg  -     Semaglutide-Weight Management (WEGOVY) 2 4 MG/0 75ML;  Inject 0 75 mL (2 4 mg total) under the skin once a week        Subjective:   Chief Complaint   Patient presents with    Follow-up     Patient ID: Bryce Wu  is a 43 y o  male with excess weight/obesity here to pursue weight management  Patient is pursuing Conservative Program      HPI  The patient presents for MWM follow up  Doing well with Saxenda other than hives at injection site  Remains localized/   Portions smaller    B: 2 eggs + salsa  S: none  L: leftovers or sandwich ([employer])  S: none  D: protein + starch  S: none    Increased appetite/cravings:  Fruit/Vegetable servings:  Exercise: Struggling with getting back to the gym, 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  Hydration: >1 gallon water per day  Sleep: interrupted due to needing to go to the bathroom - recommend discussing with PCP    The following portions of the patient's history were reviewed and updated as appropriate: allergies, current medications, past family history, past medical history, past social history, past surgical history and problem list     Review of Systems   Cardiovascular: Negative for chest pain and palpitations  Gastrointestinal: Negative  Psychiatric/Behavioral: Negative  Objective:    /82   Pulse 62   Temp (!) 97 °F (36 1 °C)   Ht 6' 1" (1 854 m)   Wt (!) 142 kg (312 lb 3 2 oz)   BMI 41 19 kg/m²      Physical Exam  Vitals and nursing note reviewed  Constitutional   General appearance: Abnormal   well developed and obese  Pulmonary   Respiratory effort: No increased work of breathing or signs of respiratory distress  Abdomen   Abdomen: Abnormal   The abdomen was obese     Musculoskeletal   Gait and station: Normal     Psychiatric   Orientation to person, place and time: Normal     Affect: appropriate

## 2022-08-15 NOTE — ASSESSMENT & PLAN NOTE
-Patient is pursuing conservative program  -Initial weight loss goal of 5-10% weight loss for improved health  -Avoid phentermine due to hypertension  Has failed wellbutrin in the past   -Currently on Saxenda and doing well, 4 5% weight loss  Start weight 334 on 4/26/22  Continues with injection site reaction  Switch to [facility]JIMENA BRADLEY  Patient denies personal and family history of MCT and MEN2 tumors   Patient denies personal history of pancreatitis    -Screening labs- CMP and lipid panel reviewed from 8/10/22   -Does not enjoy most fruits and vegetables- tries to add them when he can  -Reviewed whole grains over refined and fiber     Initial:334  Current:312  Change:-22  Goal: 250

## 2022-08-18 NOTE — TELEPHONE ENCOUNTER
Spoke to Miguel JAMISON from [employer], asked if shayna needs an auth, she states that when it is at her level it is ok

## 2022-08-19 DIAGNOSIS — E66.01 MORBID OBESITY WITH BMI OF 40.0-44.9, ADULT (HCC): ICD-10-CM

## 2022-08-19 RX ORDER — PEN NEEDLE, DIABETIC 32 GX 1/6"
NEEDLE, DISPOSABLE MISCELLANEOUS DAILY
Qty: 30 EACH | Refills: 1 | Status: SHIPPED | OUTPATIENT
Start: 2022-08-19 | End: 2022-10-28 | Stop reason: ALTCHOICE

## 2022-09-19 ENCOUNTER — CONSULT (OUTPATIENT)
Dept: CARDIOLOGY CLINIC | Facility: CLINIC | Age: 42
End: 2022-09-19
Payer: COMMERCIAL

## 2022-09-19 VITALS
HEART RATE: 63 BPM | WEIGHT: 309 LBS | SYSTOLIC BLOOD PRESSURE: 120 MMHG | HEIGHT: 73 IN | DIASTOLIC BLOOD PRESSURE: 90 MMHG | BODY MASS INDEX: 40.95 KG/M2

## 2022-09-19 DIAGNOSIS — G47.33 OSA (OBSTRUCTIVE SLEEP APNEA): ICD-10-CM

## 2022-09-19 DIAGNOSIS — I10 PRIMARY HYPERTENSION: ICD-10-CM

## 2022-09-19 DIAGNOSIS — Q23.1 BICUSPID AORTIC VALVE: Primary | ICD-10-CM

## 2022-09-19 DIAGNOSIS — I48.0 PAF (PAROXYSMAL ATRIAL FIBRILLATION) (HCC): ICD-10-CM

## 2022-09-19 PROCEDURE — 99244 OFF/OP CNSLTJ NEW/EST MOD 40: CPT | Performed by: INTERNAL MEDICINE

## 2022-09-19 PROCEDURE — 93000 ELECTROCARDIOGRAM COMPLETE: CPT | Performed by: INTERNAL MEDICINE

## 2022-09-19 NOTE — PATIENT INSTRUCTIONS
A-fib (Atrial Fibrillation)   AMBULATORY CARE:   Atrial fibrillation (A-fib)  is an irregular heartbeat  It reduces your heart's ability to pump blood through your body  A-fib may come and go, or it may be a long-term condition  A-fib can cause life-threatening blood clots, stroke, or heart failure  It is important to treat and manage A-fib to help prevent these problems  Common signs and symptoms include the following:   A heartbeat that races, pounds, or flutters    Weakness, severe tiredness, or confusion    Feeling lightheaded, sweaty, dizzy, or faint    Shortness of breath or anxiety    Chest pain or pressure    Call your local emergency number (911 in the [address] [facility]) or have someone call if:   You have any of the following signs of a heart attack:      Squeezing, pressure, or pain in your chest    You may  also have any of the following:     Discomfort or pain in your back, neck, jaw, stomach, or arm    Shortness of breath    Nausea or vomiting    Lightheadedness or a sudden cold sweat    You have any of the following signs of a stroke:      Numbness or drooping on one side of your face     Weakness in an arm or leg    Confusion or difficulty speaking    Dizziness, a severe headache, or vision loss    Call your doctor or cardiologist if:   Your arm or leg feels warm, tender, and painful  It may look swollen and red  Your heart rate is more than 110 beats per minute  You have new or worsening swelling in your legs, feet, ankles, or abdomen  You are short of breath, even at rest     You have questions or concerns about your condition or care  Treatment for A-fib:  Conditions that cause A-fib, such as thyroid disease, will be treated  You may also need any of the following:  Heart medicines  help control your heart rate or rhythm  You may need more than one medicine to treat your symptoms  Antiplatelet or blood thinner medicines  help prevent blood clots and stroke      Cardioversion  is a procedure to return your heart rate and rhythm to normal  It can be done using medicines or electric shock  A-fib ablation  is a procedure that uses energy to burn a small area of heart tissue  This creates scar tissue and prevents electrical signals that cause A-fib  You may need this procedure more than once  Ask for more information on A-fib ablation  A pacemaker  may be inserted into your heart  A pacemaker is a device that controls your heartbeat  A pacemaker may be inserted during an ablation procedure or surgery  Ask your healthcare provider for more information on pacemakers  Surgery  may be needed if other procedures do not work  During surgery your healthcare provider will make cuts in the upper part of your heart  The provider will stitch the cuts together to create scar tissue  The scar tissue will prevent electrical signals that cause A-fib  Manage A-fib:   Know your target heart rate  Learn how to check your pulse and monitor your heart rate  Know the risks if you choose to drink alcohol  Alcohol can increase your risk for A-fib or make A-fib harder to manage  Ask your healthcare provider if it is okay for you to drink any alcohol  He or she can help you set limits for the number of drinks you have in 24 hours and in a week  A drink of alcohol is 12 ounces of beer, 5 ounces of wine, or 1½ ounces of liquor  Do not smoke  Nicotine can cause heart damage and make it more difficult to manage your A-fib  Do not use e-cigarettes or smokeless tobacco in place of cigarettes or to help you quit  They still contain nicotine  Ask your healthcare provider for information if you currently smoke and need help quitting  Eat heart-healthy foods  Heart healthy foods will help keep your cholesterol low  These include fruits, vegetables, whole-grain breads, low-fat dairy products, beans, lean meats, and fish  Replace butter and margarine with heart-healthy oils such as olive oil and canola oil  Maintain a healthy weight  Ask your healthcare provider what a healthy weight is for you  Ask him or her to help you create a safe weight loss plan if you are overweight  Even a small goal of a 10% weight loss can improve your heart health  Get regular physical activity  Physical activity helps improve your heart health  Get at least 150 minutes of moderate aerobic physical activity each week  Your healthcare provider can help you create an activity plan  Manage other health conditions  This includes high blood pressure or cholesterol, sleep apnea, diabetes, and other heart conditions  Take medicine as directed and follow your treatment plan  Your healthcare provider may need to change a medicine you are taking if it is causing your A-fib  Do not  stop taking any medicine unless directed by your provider  Follow up with your doctor or cardiologist as directed: You will need regular blood tests and monitoring  Write down your questions so you remember to ask them during your visits  © Copyright [employer] 2022 Information is for End User's use only and may not be sold, redistributed or otherwise used for commercial purposes  All illustrations and images included in CareNotes® are the copyrighted property of A D A M , Inc  or [facility] Holden   The above information is an  only  It is not intended as medical advice for individual conditions or treatments  Talk to your doctor, nurse or pharmacist before following any medical regimen to see if it is safe and effective for you

## 2022-09-19 NOTE — PROGRESS NOTES
Subjective:        Patient ID: Bryce Wu is a 43 y o  male  Chief Complaint:  Sloane Mensah is here to establish local cardiac care, having been following with a cardiologist for many years  He has congenital bicuspid aortic valve  History of paroxysmal atrial fibrillation  Diltiazem caused intolerable lower extremity edema so he stopped it  Fortunately he has had no sustained PAF since  He has had about 50 episodes over the last 10-20 years, but is usually very short lived and he is satisfied with its control, he has had no concerning sustained episodes P AFib  He wears an Apple watch which is very accurate at catching it when he feels AFib  He has not had any Apple watch warnings of AFib when he does not feel it  So he is clearly symptomatic which I feel is a good thing I told him  He is fairly active, offering no complaints consistent with angina heart failure nor malignant dysrhythmia  Specifically no chest pains shortness of breath presyncope syncope TIA or claudication like symptoms  No edema orthopnea or PND  No premature family history of CAD or sudden death  He was never recommended full anticoagulation, he was never recommended to have any valve surgery or heart catheterization  He uses CPAP for sleep apnea fairly regularly without complication  The following portions of the patient's history were reviewed and updated as appropriate: allergies, current medications, past family history, past medical history, past social history, past surgical history and problem list   Review of Systems   Constitutional: Negative for chills, diaphoresis, malaise/fatigue and weight gain  HENT: Negative for nosebleeds and stridor  Eyes: Negative for double vision, vision loss in left eye, vision loss in right eye and visual disturbance  Cardiovascular: Positive for palpitations   Negative for chest pain, claudication, cyanosis, dyspnea on exertion, irregular heartbeat, leg swelling, near-syncope, orthopnea, paroxysmal nocturnal dyspnea and syncope  Respiratory: Negative for cough, shortness of breath, snoring and wheezing  Endocrine: Negative for polydipsia, polyphagia and polyuria  Hematologic/Lymphatic: Negative for bleeding problem  Does not bruise/bleed easily  Skin: Negative for flushing and rash  Musculoskeletal: Negative for falls and myalgias  Gastrointestinal: Negative for abdominal pain, heartburn, hematemesis, hematochezia, melena and nausea  Genitourinary: Negative for hematuria  Neurological: Negative for brief paralysis, dizziness, focal weakness, headaches, light-headedness, loss of balance and vertigo  Psychiatric/Behavioral: Negative for altered mental status and substance abuse  Allergic/Immunologic: Negative for hives  Objective:      /90   Pulse 63   Ht 6' 1" (1 854 m)   Wt (!) 140 kg (309 lb)   BMI 40 77 kg/m²   Physical Exam  Constitutional:       General: He is not in acute distress  Appearance: He is well-developed  He is not diaphoretic  HENT:      Head: Normocephalic and atraumatic  Eyes:      General: No scleral icterus  Pupils: Pupils are equal, round, and reactive to light  Neck:      Thyroid: No thyromegaly  Vascular: No carotid bruit or JVD  Cardiovascular:      Rate and Rhythm: Normal rate and regular rhythm  Heart sounds: Murmur (Grade 2/6 AS quality murmur right upper sternal border preserved S2) heard  No friction rub  No gallop  Pulmonary:      Effort: Pulmonary effort is normal  No respiratory distress  Breath sounds: Normal breath sounds  No stridor  No wheezing or rales  Abdominal:      General: Bowel sounds are normal  There is no distension  Palpations: Abdomen is soft  There is no mass  Tenderness: There is no abdominal tenderness  Musculoskeletal:         General: No deformity  Normal range of motion  Cervical back: Normal range of motion and neck supple  Right lower leg: No edema  Left lower leg: No edema  Skin:     General: Skin is warm and dry  Coloration: Skin is not pale  Findings: No erythema  Neurological:      Mental Status: He is alert and oriented to person, place, and time  Coordination: Coordination normal    Psychiatric:         Mood and Affect: Mood normal          Behavior: Behavior normal          Thought Content: Thought content normal          Judgment: Judgment normal          Lab Review:   Appointment on 08/10/2022   Component Date Value    WBC 08/10/2022 9 27     RBC 08/10/2022 5 21     Hemoglobin 08/10/2022 15 3     Hematocrit 08/10/2022 45 8     MCV 08/10/2022 88     MCH 08/10/2022 29 4     MCHC 08/10/2022 33 4     RDW 08/10/2022 12 8     MPV 08/10/2022 10 9     Platelets 54/77/3432 276     nRBC 08/10/2022 0     Neutrophils Relative 08/10/2022 63     Immat GRANS % 08/10/2022 0     Lymphocytes Relative 08/10/2022 25     Monocytes Relative 08/10/2022 9     Eosinophils Relative 08/10/2022 2     Basophils Relative 08/10/2022 1     Neutrophils Absolute 08/10/2022 5 85     Immature Grans Absolute 08/10/2022 0 04     Lymphocytes Absolute 08/10/2022 2 33     Monocytes Absolute 08/10/2022 0 84     Eosinophils Absolute 08/10/2022 0 14     Basophils Absolute 08/10/2022 0 07     Sodium 08/10/2022 135     Potassium 08/10/2022 3 9     Chloride 08/10/2022 104     CO2 08/10/2022 27     ANION GAP 08/10/2022 4     BUN 08/10/2022 17     Creatinine 08/10/2022 1 25     Glucose, Fasting 08/10/2022 85     Calcium 08/10/2022 9 3     AST 08/10/2022 16     ALT 08/10/2022 26     Alkaline Phosphatase 08/10/2022 47     Total Protein 08/10/2022 7 3     Albumin 08/10/2022 3 9     Total Bilirubin 08/10/2022 0 97     eGFR 08/10/2022 70     Cholesterol 08/10/2022 174     Triglycerides 08/10/2022 173 (A)    HDL, Direct 08/10/2022 38 (A)    LDL Calculated 08/10/2022 101 (A)     No results found        Assessment: 1  Bicuspid aortic valve  Ambulatory Referral to Cardiology    Echo complete w/ contrast if indicated   2  PAF (paroxysmal atrial fibrillation) [facility])  Ambulatory Referral to Cardiology    POCT ECG    Echo complete w/ contrast if indicated   3  Primary hypertension  Ambulatory Referral to Cardiology    Echo complete w/ contrast if indicated   4  TARAH (obstructive sleep apnea)          Plan:       Nini Albrecht has asymptomatic mild-to-moderate (according to 2021 echo from [facility]) aortic stenosis secondary to congenitally bicuspid aortic valve  SBE antibiotic prophylaxis is not required of him  Explained the natural progression of aortic stenosis and that there is no significant medical therapy we could undertake to prevent progression of aortic stenosis over the years  Reviewed most common symptoms of progressive/significant aortic stenosis with him in detail today  He will call should any arise, they are all absent  Discussed paroxysmal atrial fibrillation, that he is increased risk of such due to his body habitus, sleep apnea, and aortic valvular heart disease  His CHADS2 Vasc score is 1 so anticoagulation is not indicated  Encouraged CPAP compliance and ongoing weight loss efforts  There is no plan for bariatric surgery at this time  Suggested we may consider stress testing preop if this becomes a reality future, he understands  I told him I would recommend we try something like low-dose beta-blocker therapy should recurrent problematic or bothersome atrial fibrillation arise that is irritating or intolerable to him  He will call the office if this becomes the case  He will keep an eye on his Apple watch for more frequent intensity/frequency of episodes  Discussed how his triglycerides are a bit elevated, we discussed briefly how he can moderate his alcohol intake somewhat that would likely make an improvement here      I have recommended an echocardiogram in May of 2023, this will be on a 2 year basis for AS reassessment, and I asked him to call prior to this with any bothersome/sustained palpitations for possible beta-blocker med trial, but as of today I have made no medication changes  Answered questions to his satisfaction, I will see him back in the spring

## 2022-10-19 ENCOUNTER — VBI (OUTPATIENT)
Dept: ADMINISTRATIVE | Facility: OTHER | Age: 42
End: 2022-10-19

## 2022-10-28 ENCOUNTER — OFFICE VISIT (OUTPATIENT)
Dept: FAMILY MEDICINE CLINIC | Facility: CLINIC | Age: 42
End: 2022-10-28
Payer: COMMERCIAL

## 2022-10-28 VITALS
TEMPERATURE: 97.1 F | HEART RATE: 70 BPM | DIASTOLIC BLOOD PRESSURE: 76 MMHG | WEIGHT: 315 LBS | RESPIRATION RATE: 18 BRPM | BODY MASS INDEX: 41.75 KG/M2 | HEIGHT: 73 IN | SYSTOLIC BLOOD PRESSURE: 130 MMHG

## 2022-10-28 DIAGNOSIS — E87.6 HYPOKALEMIA: ICD-10-CM

## 2022-10-28 DIAGNOSIS — I48.0 PAF (PAROXYSMAL ATRIAL FIBRILLATION) (HCC): ICD-10-CM

## 2022-10-28 DIAGNOSIS — I10 PRIMARY HYPERTENSION: Primary | ICD-10-CM

## 2022-10-28 DIAGNOSIS — Z23 NEED FOR IMMUNIZATION AGAINST INFLUENZA: ICD-10-CM

## 2022-10-28 DIAGNOSIS — E66.01 MORBID OBESITY (HCC): ICD-10-CM

## 2022-10-28 DIAGNOSIS — Z00.00 ANNUAL PHYSICAL EXAM: ICD-10-CM

## 2022-10-28 PROBLEM — R00.2 HEART PALPITATIONS: Status: RESOLVED | Noted: 2019-05-13 | Resolved: 2022-10-28

## 2022-10-28 PROBLEM — Z98.890 S/P SHOULDER SURGERY: Status: RESOLVED | Noted: 2019-10-23 | Resolved: 2022-10-28

## 2022-10-28 PROCEDURE — 90686 IIV4 VACC NO PRSV 0.5 ML IM: CPT | Performed by: INTERNAL MEDICINE

## 2022-10-28 PROCEDURE — 99396 PREV VISIT EST AGE 40-64: CPT | Performed by: INTERNAL MEDICINE

## 2022-10-28 PROCEDURE — 90471 IMMUNIZATION ADMIN: CPT | Performed by: INTERNAL MEDICINE

## 2022-10-28 RX ORDER — LISINOPRIL AND HYDROCHLOROTHIAZIDE 20; 12.5 MG/1; MG/1
2 TABLET ORAL DAILY
Qty: 180 TABLET | Refills: 3 | Status: SHIPPED | OUTPATIENT
Start: 2022-10-28

## 2022-10-28 RX ORDER — POTASSIUM CHLORIDE 20 MEQ/1
20 TABLET, EXTENDED RELEASE ORAL DAILY
Qty: 90 TABLET | Refills: 3 | Status: SHIPPED | OUTPATIENT
Start: 2022-10-28

## 2022-10-28 NOTE — PATIENT INSTRUCTIONS

## 2022-10-28 NOTE — PROGRESS NOTES
ADULT ANNUAL [address] PRIMARY CARE    NAME: Rubens Carmona  AGE: 43 y o  SEX: male  : 1980     DATE: 10/28/2022     Assessment and Plan:     Problem List Items Addressed This Visit        Cardiovascular and Mediastinum    Hypertension - Primary    Relevant Medications    lisinopril-hydrochlorothiazide (PRINZIDE,ZESTORETIC) 20-12 5 MG per tablet    PAF (paroxysmal atrial fibrillation) (HCC)       Other    Morbid obesity ([facility] [address] )      Other Visit Diagnoses     Need for immunization against influenza        Relevant Orders    influenza vaccine, quadrivalent, 0 5 mL, preservative-free, for adult and pediatric patients 6 mos+ (AFLURIA, FLUARIX, FLULAVAL, FLUZONE) (Completed)    Hypokalemia        Relevant Medications    potassium chloride (K-DUR,KLOR-CON) 20 mEq tablet    Annual physical exam          Continued followup with weight mgmt  Exercise to improve HDL which was reviewed with pt  He did see Cardiology and has followup/testing setup   Flu shot today  Discussed scheduled exercise, healthier eating schedule/habits and hoping for continued improvement     Immunizations and preventive care screenings were discussed with patient today  Appropriate education was printed on patient's after visit summary  Counseling:  · Exercise: the importance of regular exercise/physical activity was discussed  Recommend exercise 3-5 times per week for at least 30 minutes  Depression Screening and Follow-up Plan: Patient was screened for depression during today's encounter  They screened negative with a PHQ-2 score of 0  Return in about 6 months (around 2023), or if symptoms worsen or fail to improve, for Recheck       Chief Complaint:     Chief Complaint   Patient presents with   • Physical Exam      History of Present Illness:     Adult Annual Physical   Patient here for a comprehensive physical exam  The patient reports problems - Pt doing ok He has started back with wt mgmt and doing ok on current regimen He does want flu shot today He did see cardiology recently and is working on healthier lifestyle options  Diet and Physical Activity  · Diet/Nutrition: limited junk food  · Exercise: no formal exercise  Depression Screening  PHQ-2/9 Depression Screening    Little interest or pleasure in doing things: 0 - not at all  Feeling down, depressed, or hopeless: 0 - not at all  PHQ-2 Score: 0  PHQ-2 Interpretation: Negative depression screen       General Health  · Sleep: gets 7-8 hours of sleep on average  · Hearing: normal - bilateral   · Vision: no vision problems  · Dental: regular dental visits   Health  · Symptoms include: none     Review of Systems:     Review of Systems   Constitutional: Negative for chills, fatigue and fever  HENT: Negative  Eyes: Negative for visual disturbance  Respiratory: Negative for cough and shortness of breath  Cardiovascular: Negative for chest pain, palpitations and leg swelling  Gastrointestinal: Negative  Tolerating weekly injection thru weight mgmt   Musculoskeletal: Negative  Neurological: Negative for dizziness, light-headedness and headaches  Psychiatric/Behavioral: Negative for sleep disturbance  The patient is not nervous/anxious         Past Medical History:     Past Medical History:   Diagnosis Date   • Aortic valve cusp abnormality     "bicuspid aortic valve"   • Atrial fibrillation ([address] )    • Hypertension       Past Surgical History:     Past Surgical History:   Procedure Laterality Date   • ANTERIOR CRUCIATE LIGAMENT REPAIR Right    • KNEE ARTHROSCOPY Right    • OTHER SURGICAL HISTORY      SAE   • TN SHLDR ARTHROSCOP,SURG,REPAIR,SLAP LESION Left 5/26/2016    Procedure: SHOULDER ARTHROSCOPY SLAP REPAIR;  Surgeon: Victor M Durand MD;  Location: [facility];  Service: Orthopedics   • TN SHLDR ARTHROSCOP,SURG,REPAIR,SLAP LESION Right 11/26/2018    Procedure: Shoulder Arthroscopy with labral repair and biceps tenotomy;  Surgeon: Anson Ho MD;  Location: [facility];  Service: Orthopedics   • ROTATOR CUFF REPAIR Left    • VASECTOMY        Family History:     Family History   Problem Relation Age of Onset   • Hypertension Father    • Prostate cancer Maternal Grandfather    • Other Paternal Grandmother         Carotid Stenosis    • Hyperlipidemia Paternal Grandmother    • Hypertension Paternal Grandmother    • Stroke Paternal Grandmother    • Coronary artery disease Paternal Grandfather    • Hyperlipidemia Paternal Grandfather    • Hypertension Paternal Grandfather    • Diabetes type II Paternal Grandfather    • Breast cancer Mother    • Thyroid disease Neg Hx       Social History:     Social History     Socioeconomic History   • Marital status:       Spouse name: None   • Number of children: None   • Years of education: None   • Highest education level: None   Occupational History   • None   Tobacco Use   • Smoking status: Never Smoker   • Smokeless tobacco: Current User     Types: Chew   • Tobacco comment: Tried Welbutrin (sp?), didnt work   Vaping Use   • Vaping Use: Never used   Substance and Sexual Activity   • Alcohol use: Yes     Comment: Not nearly enough   • Drug use: No   • Sexual activity: Yes     Partners: Female     Birth control/protection: Male Sterilization   Other Topics Concern   • None   Social History Narrative    Always uses seat belt     Caffeine Use     Dental care regularly           Social Determinants of Health     Financial Resource Strain: Not on file   Food Insecurity: Not on file   Transportation Needs: Not on file   Physical Activity: Not on file   Stress: Not on file   Social Connections: Not on file   Intimate Partner Violence: Not on file   Housing Stability: Not on file      Current Medications:     Current Outpatient Medications   Medication Sig Dispense Refill   • lisinopril-hydrochlorothiazide (PRINZIDE,ZESTORETIC) 20-12 5 MG per tablet Take 2 tablets by mouth daily 180 tablet 3   • Multiple Vitamins-Minerals (MULTIVITAMIN ADULT EXTRA C PO) Take by mouth     • potassium chloride (K-DUR,KLOR-CON) 20 mEq tablet Take 1 tablet (20 mEq total) by mouth daily 90 tablet 3   • Semaglutide-Weight Management (WEGOVY) 1 7 MG/0 75ML Inject 0 75 mL (1 7 mg total) under the skin once a week For one month then increase to 2 4 mg 3 mL 0   • Semaglutide-Weight Management (WEGOVY) 2 4 MG/0 75ML Inject 0 75 mL (2 4 mg total) under the skin once a week 3 mL 1     No current facility-administered medications for this visit  Allergies: Allergies   Allergen Reactions   • Penicillins Hives      Physical Exam:     /76   Pulse 70   Temp (!) 97 1 °F (36 2 °C) (Temporal)   Resp 18   Ht 6' 1" (1 854 m)   Wt (!) 144 kg (317 lb 6 4 oz)   BMI 41 88 kg/m²     Physical Exam  Vitals reviewed  Constitutional:       General: He is not in acute distress  Appearance: Normal appearance  He is not ill-appearing, toxic-appearing or diaphoretic  HENT:      Head: Normocephalic and atraumatic  Right Ear: External ear normal       Left Ear: External ear normal       Nose: Nose normal       Mouth/Throat:      Mouth: Mucous membranes are dry  Eyes:      General: No scleral icterus  Cardiovascular:      Rate and Rhythm: Normal rate and regular rhythm  Pulses: Normal pulses  Pulmonary:      Effort: Pulmonary effort is normal  No respiratory distress  Breath sounds: Normal breath sounds  Abdominal:      General: Bowel sounds are normal  There is no distension  Palpations: Abdomen is soft  Tenderness: There is no abdominal tenderness  Musculoskeletal:      Cervical back: Normal range of motion and neck supple  No rigidity  Right lower leg: No edema  Left lower leg: No edema  Lymphadenopathy:      Cervical: No cervical adenopathy  Skin:     General: Skin is dry  Neurological:      General: No focal deficit present  Mental Status: He is alert and oriented to person, place, and time  Mental status is at baseline  Psychiatric:         Mood and Affect: Mood normal          Behavior: Behavior normal          Thought Content:  Thought content normal          Judgment: Judgment normal           Constanza Payment, DO  [address] [facility]

## 2022-11-01 DIAGNOSIS — E66.01 MORBID OBESITY WITH BMI OF 40.0-44.9, ADULT (HCC): ICD-10-CM

## 2022-11-07 ENCOUNTER — OFFICE VISIT (OUTPATIENT)
Dept: BARIATRICS | Facility: CLINIC | Age: 42
End: 2022-11-07

## 2022-11-07 VITALS
TEMPERATURE: 98 F | BODY MASS INDEX: 41.22 KG/M2 | WEIGHT: 311 LBS | DIASTOLIC BLOOD PRESSURE: 88 MMHG | HEIGHT: 73 IN | OXYGEN SATURATION: 97 % | HEART RATE: 79 BPM | SYSTOLIC BLOOD PRESSURE: 132 MMHG

## 2022-11-07 DIAGNOSIS — Z79.899 MEDICATION MANAGEMENT: ICD-10-CM

## 2022-11-07 DIAGNOSIS — E66.01 CLASS 3 OBESITY (HCC): Primary | ICD-10-CM

## 2022-11-07 DIAGNOSIS — I10 PRIMARY HYPERTENSION: ICD-10-CM

## 2022-11-07 NOTE — PROGRESS NOTES
Assessment/Plan: Morbid obesity with BMI of 40 0-44 9, adult ([facility] [address] )  -Patient is pursuing conservative program  -Initial weight loss goal of 5-10% weight loss for improved health  -Avoid phentermine due to hypertension  Has failed wellbutrin in the past   -Currently on Saxenda  Start weight 334 on 4/26/22  Continued with injection site reaction so was switched to [facility]JIMENA BRADLEY on 8/15/22 (312)   Patient denies personal and family history of MCT and MEN2 tumors  Patient denies personal history of pancreatitis    -Screening labs- CMP and lipid panel reviewed from 8/10/22   -Does not enjoy most fruits and vegetables- tries to add them when he can  -Dietary recall reviewed; reviewed exercise goals     Initial:334  Current:311 (-1 lb since last f/u)  Change:-23  Goal: 250     Hypertension  -taking lisinopril/HCTZ  -c/w lifestyle changes/weight loss    Follow up in approximately 2 months with Non-Surgical Physician/Advanced Practitioner  Goals:  Food log (ie ) www myfitnesspal com,sparkpeople  com,loseit com,calorieking  com,etc  baritastic  No sugary beverages  At least 64oz of water daily  Increase physical activity by 10 minutes daily  Gradually increase physical activity to a goal of 5 days per week for 30 minutes of MODERATE intensity PLUS 2 days per week of FULL BODY resistance training  5-10 servings of fruits and vegetables per day and 25-35 grams of dietary fiber per day, gradually increasing  9531-9690 calories   If choosing starch pick whole grain/complex carbs and keep to 1/2-3/4 cup: oatmeal, brown rice, quinoa, whole wheat pasta, potatoes, sweet potato, chickpea noodles, red lentil noodles  Recommend checking lab coverage before having labs drawn     Diagnoses and all orders for this visit:    Class 3 obesity ([facility] [address] )  -     Semaglutide-Weight Management (WEGOVY) 2 4 MG/0 75ML; Inject 0 75 mL (2 4 mg total) under the skin once a week  -     Comprehensive metabolic panel;  Future    Medication management  - Comprehensive metabolic panel; Future    Primary hypertension  -     Comprehensive metabolic panel; Future    BMI 40 0-44 9, adult [facility])        Subjective:   Chief Complaint   Patient presents with   • Follow-up     Patient ID: Kary Martin  is a 43 y o  male with excess weight/obesity here to pursue weight management  Patient is pursuing Conservative Program      HPI  The patient presents for MWM follow up  Switched to Jaya Tyler once weekly dosing  No side effects  No longer having localized reaction  +effects on appetite and fullness    B: 2 eggs + salsa  S: none  L: leftovers or buys out  S: none  D: protein + starch  S: none    Exercise: just restarted- 20 minutes of cardio (elliptical) + free weights for 30 minutes; He is goal is to do this 5x/week  Hydration: >1 gallon water per day    The following portions of the patient's history were reviewed and updated as appropriate: allergies, current medications, past family history, past medical history, past social history, past surgical history and problem list     Review of Systems   Psychiatric/Behavioral: Negative  Objective:    /88 (BP Location: Left arm, Cuff Size: Large)   Pulse 79   Temp 98 °F (36 7 °C)   Ht 6' 1" (1 854 m)   Wt (!) 141 kg (311 lb)   SpO2 97%   BMI 41 03 kg/m²      Physical Exam  Vitals and nursing note reviewed  Constitutional   General appearance: Abnormal   well developed and morbidly obese      Pulmonary   Respiratory effort: No increased work of breathing or signs of respiratory distress  Abdomen   Abdomen: Abnormal   The abdomen was obese     Musculoskeletal   Gait and station: Normal     Psychiatric   Orientation to person, place and time: Normal     Affect: appropriate

## 2022-11-07 NOTE — PATIENT INSTRUCTIONS
Goals: Food log (ie ) www myfitnesspal com,sparkpeople  com,loseit com,calorieking  com,etc  baritastic  No sugary beverages  At least 64oz of water daily  Increase physical activity by 10 minutes daily   Gradually increase physical activity to a goal of 5 days per week for 30 minutes of MODERATE intensity PLUS 2 days per week of FULL BODY resistance training  5-10 servings of fruits and vegetables per day and 25-35 grams of dietary fiber per day, gradually increasing  7572-9447 calories   If choosing starch pick whole grain/complex carbs and keep to 1/2-3/4 cup: oatmeal, brown rice, quinoa, whole wheat pasta, potatoes, sweet potato, chickpea noodles, red lentil noodles  Recommend checking lab coverage before having labs drawn

## 2022-11-07 NOTE — ASSESSMENT & PLAN NOTE
-Patient is pursuing conservative program  -Initial weight loss goal of 5-10% weight loss for improved health  -Avoid phentermine due to hypertension  Has failed wellbutrin in the past   -Currently on Saxenda  Start weight 334 on 4/26/22  Continued with injection site reaction so was switched to [facility]JIMENA BRADLEY on 8/15/22 (312)   Patient denies personal and family history of MCT and MEN2 tumors   Patient denies personal history of pancreatitis    -Screening labs- CMP and lipid panel reviewed from 8/10/22   -Does not enjoy most fruits and vegetables- tries to add them when he can  -Dietary recall reviewed; reviewed exercise goals     Initial:334  Current:311 (-1 lb since last f/u)  Change:-23  Goal: 250

## 2022-12-29 ENCOUNTER — VBI (OUTPATIENT)
Dept: ADMINISTRATIVE | Facility: OTHER | Age: 42
End: 2022-12-29

## 2023-02-18 ENCOUNTER — APPOINTMENT (OUTPATIENT)
Dept: LAB | Facility: MEDICAL CENTER | Age: 43
End: 2023-02-18

## 2023-02-18 DIAGNOSIS — I10 PRIMARY HYPERTENSION: ICD-10-CM

## 2023-02-18 DIAGNOSIS — Z79.899 MEDICATION MANAGEMENT: ICD-10-CM

## 2023-02-18 DIAGNOSIS — E66.01 CLASS 3 OBESITY (HCC): ICD-10-CM

## 2023-02-18 LAB
ALBUMIN SERPL BCP-MCNC: 3.9 G/DL (ref 3.5–5)
ALP SERPL-CCNC: 49 U/L (ref 46–116)
ALT SERPL W P-5'-P-CCNC: 23 U/L (ref 12–78)
ANION GAP SERPL CALCULATED.3IONS-SCNC: 7 MMOL/L (ref 4–13)
AST SERPL W P-5'-P-CCNC: 12 U/L (ref 5–45)
BILIRUB SERPL-MCNC: 0.39 MG/DL (ref 0.2–1)
BUN SERPL-MCNC: 20 MG/DL (ref 5–25)
CALCIUM SERPL-MCNC: 9.1 MG/DL (ref 8.3–10.1)
CHLORIDE SERPL-SCNC: 104 MMOL/L (ref 96–108)
CO2 SERPL-SCNC: 25 MMOL/L (ref 21–32)
CREAT SERPL-MCNC: 1.22 MG/DL (ref 0.6–1.3)
GFR SERPL CREATININE-BSD FRML MDRD: 72 ML/MIN/1.73SQ M
GLUCOSE P FAST SERPL-MCNC: 88 MG/DL (ref 65–99)
POTASSIUM SERPL-SCNC: 4.1 MMOL/L (ref 3.5–5.3)
PROT SERPL-MCNC: 6.7 G/DL (ref 6.4–8.4)
SODIUM SERPL-SCNC: 136 MMOL/L (ref 135–147)

## 2023-02-20 ENCOUNTER — TELEPHONE (OUTPATIENT)
Dept: BARIATRICS | Facility: CLINIC | Age: 43
End: 2023-02-20

## 2023-02-20 NOTE — TELEPHONE ENCOUNTER
I called and left message that we need to cancel weight management appt today  Dr Elmo Arizmendi will not be in today  Please call the office back to reschedule

## 2023-03-07 ENCOUNTER — OFFICE VISIT (OUTPATIENT)
Dept: BARIATRICS | Facility: CLINIC | Age: 43
End: 2023-03-07

## 2023-03-07 VITALS
DIASTOLIC BLOOD PRESSURE: 84 MMHG | HEART RATE: 91 BPM | BODY MASS INDEX: 40.82 KG/M2 | OXYGEN SATURATION: 99 % | SYSTOLIC BLOOD PRESSURE: 126 MMHG | WEIGHT: 309.4 LBS

## 2023-03-07 DIAGNOSIS — I10 PRIMARY HYPERTENSION: ICD-10-CM

## 2023-03-07 DIAGNOSIS — E66.01 MORBID OBESITY WITH BMI OF 40.0-44.9, ADULT (HCC): ICD-10-CM

## 2023-03-07 DIAGNOSIS — G47.33 OSA (OBSTRUCTIVE SLEEP APNEA): ICD-10-CM

## 2023-03-07 NOTE — PATIENT INSTRUCTIONS
I am sending the [facility]JIMENA MIRNA to your pharmacy  This will start the preauthorization process  My office takes care of that  It can take up to 3 weeks to process  Start Wegovy 0 25 mg subcutaneously once a week for 4 weeks, then increase to 0 5 mg subcutaneously each week  After you take the first pen of the starting dose of 0 25 mg, please message me to submit the next dose of 0 5 mg, as we will also likely need to do another preauth for that one      - Side effects of Wegovy discussed: nausea, vomiting, diarrhea, and constipation  If severe abdominal pain develops, stop Wegovy and go to the ER, as this could be pancreatitis  Monitor heart rate while on Wegovy and if resting heart rate greater than 100 beats per minute, patient will notify me

## 2023-03-07 NOTE — ASSESSMENT & PLAN NOTE
-Patient is pursuing conservative program  - Not interested in weight loss surgery   -Initial weight loss goal of 5-10% weight loss for improved health  -Avoid phentermine due to hypertension  Has failed wellbutrin in the past   - Was on Saxenda  Start weight 334 on 4/26/22  Continued with injection site reaction so was switched to [facility] FAISAL BRADLEY on 8/15/22 (312)   Patient denies personal and family history of MCT and MEN2 tumors  Patient denies personal history of pancreatitis  - Ran out of Wegovy 2 weeks ago  Was tolerating well and helping with appetite  Given he has been off of it, will restart at lowest dose to try to minimize side effects and titrate back up to goal dose of 2 4 mg weekly  - Restart Wegovy 0 25 mg weekly  - Side effects of Wegovy discussed: nausea, vomiting, diarrhea, and constipation  If severe abdominal pain develops, stop Wegovy and go to the ER, as this could be pancreatitis  Monitor heart rate while on Wegovy and if resting heart rate greater than 100 beats per minute, patient will notify me    -Does not enjoy most fruits and vegetables- tries to add them when he can  - CMP 2/18/2023 reviewed and was within acceptable range       Initial:334  Last visit:311   Current: 309 4 lbs BMI 40 82  Change:-24 6 lbs (-1 6 lbs since the last office visit)  Goal: 250     Goals:  Start food logging  2200 calories per day  Keep up the great work with water intake  Start exercise 2 days per week 10 minutes cardiovascular exercise with gradual increase to goal of 5 days per week 30 minutes and 2 days resistance training  Restart [id number]

## 2023-03-07 NOTE — PROGRESS NOTES
Assessment/Plan: Morbid obesity with BMI of 40 0-44 9, adult [facility])  -Patient is pursuing conservative program  - Not interested in weight loss surgery   -Initial weight loss goal of 5-10% weight loss for improved health  -Avoid phentermine due to hypertension  Has failed wellbutrin in the past   - Was on Saxenda  Start weight 334 on 4/26/22  Continued with injection site reaction so was switched to [facility] MIRNA on 8/15/22 (312)   Patient denies personal and family history of MCT and MEN2 tumors  Patient denies personal history of pancreatitis  - Ran out of Wegovy 2 weeks ago  Was tolerating well and helping with appetite  Given he has been off of it, will restart at lowest dose to try to minimize side effects and titrate back up to goal dose of 2 4 mg weekly  - Restart Wegovy 0 25 mg weekly  - Side effects of Wegovy discussed: nausea, vomiting, diarrhea, and constipation  If severe abdominal pain develops, stop Wegovy and go to the ER, as this could be pancreatitis  Monitor heart rate while on Wegovy and if resting heart rate greater than 100 beats per minute, patient will notify me    -Does not enjoy most fruits and vegetables- tries to add them when he can  - CMP 2/18/2023 reviewed and was within acceptable range       Initial:334  Last visit:311   Current: 309 4 lbs BMI 40 82  Change:-24 6 lbs (-1 6 lbs since the last office visit)  Goal: 250     Goals:  Start food logging  2200 calories per day  Keep up the great work with water intake  Start exercise 2 days per week 10 minutes cardiovascular exercise with gradual increase to goal of 5 days per week 30 minutes and 2 days resistance training  Restart FTYUTD  Hypertension  - Taking prinzide  May improve with weight loss and lifestyle modification  Continue management with prescribing provider  TARAH (obstructive sleep apnea)  - Continue regular use of CPAP  Hasmukh Sanchez was seen today for follow-up      Diagnoses and all orders for this visit:    BMI 40 0-44 9, adult ([facility] )  -     Semaglutide-Weight Management (WEGOVY) 0 25 MG/0 5ML; Inject 0 5 mL (0 25 mg total) under the skin once a week    Morbid obesity with BMI of 40 0-44 9, adult ([facility] )    Primary hypertension    TARAH (obstructive sleep apnea)            Follow up in approximately 3 months with Non-Surgical Physician/Advanced Practitioner  Subjective:   Chief Complaint   Patient presents with   • Follow-up     MWM 4 mth fu       Patient ID: Raheel Tay  is a 43 y o  male with excess weight/obesity here to pursue weight management  Patient is pursuing Conservative Program    Most recent notes and records were reviewed  HPI    Wt Readings from Last 10 Encounters:   03/07/23 (!) 140 kg (309 lb 6 4 oz)   11/07/22 (!) 141 kg (311 lb)   10/28/22 (!) 144 kg (317 lb 6 4 oz)   09/19/22 (!) 140 kg (309 lb)   08/15/22 (!) 142 kg (312 lb 3 2 oz)   07/28/22 (!) 142 kg (312 lb)   05/17/22 (!) 145 kg (319 lb)   03/30/22 (!) 152 kg (334 lb 3 2 oz)   03/30/22 (!) 153 kg (337 lb)   02/11/22 (!) 148 kg (326 lb 6 4 oz)     Previously following with my colleagues Cydney Das PA-C and Leonel Killian PA-C  Last saw Patsy November 2022  Was taking Wegovy 2 4 mg weekly  Tolerating well and was helping with appetite  Ran out of Wegovy 2 weeks ago  Has noticed an increase in appetite since running out of it  Has not been food logging  B- skips or 2 eggs and toast   S- none or girl  cookies   L- sandwich or leftovers   S- none  D- protein and sometimes veggies and starch   S- none or cookies     Hydration- 8-10 bottles of water, celsius or zero sugar monster once daily  Alcohol- 2-3 drinks on the weekends   Tobacco- denies  Exercise- none currently, was going to gym previously  Occupation- [employer]    Sleep- 7 hours  Has TARAH, on CPAP     Colonoscopy: N/A      The following portions of the patient's history were reviewed and updated as appropriate: allergies, current medications, past family history, past medical history, past social history, past surgical history, and problem list     Family History   Problem Relation Age of Onset   • Hypertension Father    • Prostate cancer Maternal Grandfather    • Other Paternal Grandmother         Carotid Stenosis    • Hyperlipidemia Paternal Grandmother    • Hypertension Paternal Grandmother    • Stroke Paternal Grandmother    • Coronary artery disease Paternal Grandfather    • Hyperlipidemia Paternal Grandfather    • Hypertension Paternal Grandfather    • Diabetes type II Paternal Grandfather    • Breast cancer Mother    • Thyroid disease Neg Hx         Review of Systems   HENT: Negative for sore throat  Respiratory: Negative for cough and shortness of breath  Cardiovascular: Negative for chest pain and palpitations  Gastrointestinal: Positive for constipation  Negative for abdominal pain, diarrhea, nausea and vomiting  Denies GERD  Musculoskeletal: Negative for arthralgias and back pain  Skin: Negative for rash  Psychiatric/Behavioral: Negative for suicidal ideas  Denies anxiety and depression       Objective:  /84   Pulse 91   Wt (!) 140 kg (309 lb 6 4 oz)   SpO2 99%   BMI 40 82 kg/m²     Physical Exam  Vitals and nursing note reviewed  Constitutional   General appearance: Abnormal   well developed and morbidly obese  Eyes No conjunctival injection  Ears, Nose, Mouth, and Throat Oral mucosa moist    Pulmonary   Respiratory effort: No increased work of breathing or signs of respiratory distress  Cardiovascular    Examination of extremities for edema and/or varicosities: Normal   no edema  Abdomen   Abdomen: Abnormal   The abdomen was obese      Musculoskeletal   Normal range of motion  Neurological   Gait and station: Normal    Psychiatric   Orientation to person, place and time: Normal     Affect: appropriate

## 2023-03-07 NOTE — ASSESSMENT & PLAN NOTE
- Taking prinzide  May improve with weight loss and lifestyle modification  Continue management with prescribing provider

## 2023-03-29 DIAGNOSIS — G47.33 OSA (OBSTRUCTIVE SLEEP APNEA): ICD-10-CM

## 2023-03-29 DIAGNOSIS — I10 PRIMARY HYPERTENSION: ICD-10-CM

## 2023-04-27 ENCOUNTER — OFFICE VISIT (OUTPATIENT)
Dept: FAMILY MEDICINE CLINIC | Facility: CLINIC | Age: 43
End: 2023-04-27

## 2023-04-27 VITALS
RESPIRATION RATE: 18 BRPM | DIASTOLIC BLOOD PRESSURE: 78 MMHG | SYSTOLIC BLOOD PRESSURE: 128 MMHG | HEIGHT: 73 IN | WEIGHT: 309 LBS | HEART RATE: 70 BPM | BODY MASS INDEX: 40.95 KG/M2

## 2023-04-27 DIAGNOSIS — E66.01 MORBID OBESITY WITH BMI OF 40.0-44.9, ADULT (HCC): ICD-10-CM

## 2023-04-27 DIAGNOSIS — Q23.1 BICUSPID AORTIC VALVE: ICD-10-CM

## 2023-04-27 DIAGNOSIS — I48.0 PAF (PAROXYSMAL ATRIAL FIBRILLATION) (HCC): ICD-10-CM

## 2023-04-27 DIAGNOSIS — I10 PRIMARY HYPERTENSION: Primary | ICD-10-CM

## 2023-04-27 NOTE — PROGRESS NOTES
Name: Evelyn Salter      : 1980      MRN: [id number]  Encounter Provider: Enedina Goodwin DO  Encounter Date: 2023   Encounter department: [address]     1  Primary hypertension  -     Lipid panel; Future  -     Comprehensive metabolic panel; Future  Bp stable  He has echo and cardio appt upcoming  He is titrating up on the [facility] and tolerating     2  Morbid obesity with BMI of 40 0-44 9, adult ([address] )  -     Lipid panel; Future  -     Comprehensive metabolic panel; Future  Pt getting back on track with his injectable therapy and following with weight mgmt      3  PAF (paroxysmal atrial fibrillation) (HCC)  Stable and has echo/cardio appt in   Bicuspid aortic valve  Stable  ENcuraged healthy lifestyle and exercise   He is working with wt mgmt ongoing    Encouraged chew tobacco cessation    Depression Screening and Follow-up Plan: Patient was screened for depression during today's encounter  They screened negative with a PHQ-2 score of 0  Tobacco Cessation Counseling: Tobacco cessation counseling was provided  The patient is sincerely urged to quit consumption of tobacco  He is not ready to quit tobacco          Subjective      HPI   Pt doing ok overall He is getting back on track with wygovy - he had to retitrate rx recently since missed a couple weeks He tolerates that well and is getting back on healthier lifestyle He drinks mainly water Not exercising consistently but more active outside in nicer weather No chest pain No palpitations He has upcoming cardio appt in and echo scheduled as well as wt mgmt followup  Review of Systems   Constitutional: Negative for chills and fever  HENT: Negative  Eyes: Negative for visual disturbance  Respiratory: Negative for cough and shortness of breath  Cardiovascular: Negative for chest pain, palpitations and leg swelling  Gastrointestinal: Negative for abdominal distention and abdominal pain  "  Genitourinary: Negative  Musculoskeletal: Negative  Neurological: Negative for dizziness, light-headedness and headaches  Psychiatric/Behavioral: Negative for sleep disturbance  The patient is not nervous/anxious  Current Outpatient Medications on File Prior to Visit   Medication Sig   • lisinopril-hydrochlorothiazide (PRINZIDE,ZESTORETIC) 20-12 5 MG per tablet Take 2 tablets by mouth daily   • Multiple Vitamins-Minerals (MULTIVITAMIN ADULT EXTRA C PO) Take by mouth   • potassium chloride (K-DUR,KLOR-CON) 20 mEq tablet Take 1 tablet (20 mEq total) by mouth daily   • Semaglutide-Weight Management (WEGOVY) 0 5 MG/0 5ML Inject 0 5 mL (0 5 mg total) under the skin once a week       Objective     /78   Pulse 70   Resp 18   Ht 6' 1\" (1 854 m)   Wt (!) 140 kg (309 lb)   BMI 40 77 kg/m²     Physical Exam  Vitals reviewed  Constitutional:       General: He is not in acute distress  Appearance: Normal appearance  He is not ill-appearing, toxic-appearing or diaphoretic  HENT:      Head: Normocephalic and atraumatic  Right Ear: External ear normal       Left Ear: External ear normal       Nose: Nose normal       Mouth/Throat:      Mouth: Mucous membranes are moist    Eyes:      General: No scleral icterus  Extraocular Movements: Extraocular movements intact  Conjunctiva/sclera: Conjunctivae normal       Pupils: Pupils are equal, round, and reactive to light  Cardiovascular:      Rate and Rhythm: Normal rate and regular rhythm  Pulses: Normal pulses  Heart sounds: Normal heart sounds  Pulmonary:      Effort: Pulmonary effort is normal  No respiratory distress  Breath sounds: Normal breath sounds  Abdominal:      General: Bowel sounds are normal  There is no distension  Palpations: Abdomen is soft  Tenderness: There is no abdominal tenderness  Musculoskeletal:      Cervical back: Normal range of motion and neck supple        Right lower leg: No " edema  Left lower leg: No edema  Lymphadenopathy:      Cervical: No cervical adenopathy  Skin:     General: Skin is warm and dry  Coloration: Skin is not jaundiced or pale  Neurological:      General: No focal deficit present  Mental Status: He is alert and oriented to person, place, and time  Mental status is at baseline  Cranial Nerves: No cranial nerve deficit  Sensory: No sensory deficit  Psychiatric:         Mood and Affect: Mood normal          Behavior: Behavior normal          Thought Content:  Thought content normal          Judgment: Judgment normal        Michail Specking, DO

## 2023-04-30 DIAGNOSIS — I10 PRIMARY HYPERTENSION: ICD-10-CM

## 2023-04-30 DIAGNOSIS — G47.33 OSA (OBSTRUCTIVE SLEEP APNEA): ICD-10-CM

## 2023-05-01 RX ORDER — SEMAGLUTIDE 1 MG/.5ML
1 INJECTION, SOLUTION SUBCUTANEOUS WEEKLY
Qty: 2 ML | Refills: 0 | Status: SHIPPED | OUTPATIENT
Start: 2023-05-01

## 2023-05-30 DIAGNOSIS — I10 PRIMARY HYPERTENSION: ICD-10-CM

## 2023-05-30 DIAGNOSIS — G47.33 OSA (OBSTRUCTIVE SLEEP APNEA): ICD-10-CM

## 2023-05-30 RX ORDER — SEMAGLUTIDE 1.7 MG/.75ML
1.7 INJECTION, SOLUTION SUBCUTANEOUS WEEKLY
Qty: 3 ML | Refills: 0 | Status: SHIPPED | OUTPATIENT
Start: 2023-05-30 | End: 2023-06-07

## 2023-05-30 RX ORDER — SEMAGLUTIDE 1 MG/.5ML
1 INJECTION, SOLUTION SUBCUTANEOUS WEEKLY
Qty: 2 ML | Refills: 0 | Status: CANCELLED | OUTPATIENT
Start: 2023-05-30

## 2023-06-07 ENCOUNTER — OFFICE VISIT (OUTPATIENT)
Dept: BARIATRICS | Facility: CLINIC | Age: 43
End: 2023-06-07
Payer: COMMERCIAL

## 2023-06-07 VITALS
WEIGHT: 311.8 LBS | SYSTOLIC BLOOD PRESSURE: 138 MMHG | HEIGHT: 73 IN | HEART RATE: 80 BPM | BODY MASS INDEX: 41.32 KG/M2 | RESPIRATION RATE: 16 BRPM | DIASTOLIC BLOOD PRESSURE: 90 MMHG

## 2023-06-07 DIAGNOSIS — G47.33 OSA (OBSTRUCTIVE SLEEP APNEA): ICD-10-CM

## 2023-06-07 DIAGNOSIS — E66.01 MORBID OBESITY WITH BMI OF 40.0-44.9, ADULT (HCC): Primary | ICD-10-CM

## 2023-06-07 DIAGNOSIS — I10 PRIMARY HYPERTENSION: ICD-10-CM

## 2023-06-07 PROCEDURE — 99214 OFFICE O/P EST MOD 30 MIN: CPT | Performed by: PHYSICIAN ASSISTANT

## 2023-06-07 NOTE — ASSESSMENT & PLAN NOTE
-Patient is pursuing conservative program  - Not interested in weight loss surgery   -Initial weight loss goal of 5-10% weight loss for improved health      - Prior to last OV ran out of wegovy and needed to be restarted at 0 25mg  Currently on 1 7mg and starting to feel more effective  To titrate to 2 4mg  Denies any side effects  Patient denies personal and family history of MCT and MEN2 tumors  Patient denies personal history of pancreatitis  Other medication options:Avoid phentermine due to hypertension, has failed wellbutrin in the past  He was on Saxenda  Start weight 334 on 4/26/22  Continued with injection site reaction so was switched to [id number] on 8/15/22 (312)         Initial:334  Last visit:311   Current: 309 4 lbs BMI 40 82  Change:-24 6 lbs (-1 6 lbs since the last office visit)  Goal: 250     Goals:  -Start food logging -2200 calories per day  -recommend adding on additional day of exercise  -discussed trying to balance out meal and choose low calorie options   He does not enjoy many fruits/vegetables so try to add when possible or mix in discreetly to meals

## 2023-06-07 NOTE — ASSESSMENT & PLAN NOTE
- Taking prinzide  May improve with weight loss and lifestyle modification  -BP was elevated today but asymptomatic    Seeing cardiology tomorrow

## 2023-06-07 NOTE — PROGRESS NOTES
Assessment/Plan:    TARAH (obstructive sleep apnea)  - Continue regular use of CPAP  Hypertension  - Taking prinzide  May improve with weight loss and lifestyle modification  -BP was elevated today but asymptomatic  Seeing cardiology tomorrow      Morbid obesity with BMI of 40 0-44 9, adult [facility])  -Patient is pursuing conservative program  - Not interested in weight loss surgery   -Initial weight loss goal of 5-10% weight loss for improved health      - Prior to last OV ran out of wegovy and needed to be restarted at 0 25mg  Currently on 1 7mg and starting to feel more effective  To titrate to 2 4mg  Denies any side effects  Patient denies personal and family history of MCT and MEN2 tumors  Patient denies personal history of pancreatitis  Other medication options:Avoid phentermine due to hypertension, has failed wellbutrin in the past  He was on Saxenda  Start weight 334 on 4/26/22  Continued with injection site reaction so was switched to [facility]JIMENA BRADLEY on 8/15/22 (312)         Initial:334  Last visit:311   Current: 309 4 lbs BMI 40 82  Change:-24 6 lbs (-1 6 lbs since the last office visit)  Goal: 250     Goals:  -Start food logging -2200 calories per day  -recommend adding on additional day of exercise  -discussed trying to balance out meal and choose low calorie options  He does not enjoy many fruits/vegetables so try to add when possible or mix in discreetly to meals        Follow up in approximately 3 months with Non-Surgical Physician/Advanced Practitioner  Diagnoses and all orders for this visit:    Morbid obesity with BMI of 40 0-44 9, adult ([address] )  -     Semaglutide-Weight Management (WEGOVY) 2 4 MG/0 75ML;  Inject 0 75 mL (2 4 mg total) under the skin once a week    TARAH (obstructive sleep apnea)    Primary hypertension          Subjective:   Chief Complaint   Patient presents with   • Follow-up     MWM 3mth f/u; waist 49in        Patient ID: Danyel [place]  is a 37 y o  male with excess weight/obesity here to pursue weight managment  Patient is pursuing Conservative Program      HPI  Here for MWM follow  He is now   Wt Readings from Last 10 Encounters:   06/07/23 (!) 141 kg (311 lb 12 8 oz)   04/27/23 (!) 140 kg (309 lb)   03/07/23 (!) 140 kg (309 lb 6 4 oz)   11/07/22 (!) 141 kg (311 lb)   10/28/22 (!) 144 kg (317 lb 6 4 oz)   09/19/22 (!) 140 kg (309 lb)   08/15/22 (!) 142 kg (312 lb 3 2 oz)   07/28/22 (!) 142 kg (312 lb)   05/17/22 (!) 145 kg (319 lb)   03/30/22 (!) 152 kg (334 lb 3 2 oz)       Food logging:no,    Increased appetite/cravings:  Fruit/Vegetable servings:  Exercise:lawn mowing 1 time a week  Hydration:water, soda today    Diet Recall:  Usually either breakfast or lunch (can be sandwich)  Dinner: protein, starch, vegetable  Colonoscopy-Not applicable    The following portions of the patient's history were reviewed and updated as appropriate:   He  has a past medical history of Aortic valve cusp abnormality, Atrial fibrillation ([facility] ), and Hypertension  He   Patient Active Problem List    Diagnosis Date Noted   • TARAH (obstructive sleep apnea)    • Aortic aneurysm of unspecified site, without rupture ([facility] ) 07/28/2021   • Morbid obesity ([place] [address] ) 02/04/2021   • Visit for preventive health examination 04/24/2020   • PAF (paroxysmal atrial fibrillation) ([facility] ) 05/20/2019   • Morbid obesity with BMI of 40 0-44 9, adult ([facility] ) 05/13/2019   • Labral tear of shoulder, right, subsequent encounter 08/27/2018   • Bicuspid aortic valve 11/22/2017   • Hypertension 11/22/2017   • SLAP tear of shoulder 05/26/2016   • Hyperlipidemia 01/02/2015     He  has a past surgical history that includes Anterior cruciate ligament repair (Right); Vasectomy; Other surgical history; pr surgical arthroscopy shoulder repair slap lesion (Left, 5/26/2016); Knee arthroscopy (Right); Rotator cuff repair (Left); and pr surgical arthroscopy shoulder repair slap lesion (Right, 11/26/2018)    His family history includes Breast "cancer in his mother; Coronary artery disease in his paternal grandfather; Diabetes type II in his paternal grandfather; Hyperlipidemia in his paternal grandfather and paternal grandmother; Hypertension in his father, paternal grandfather, and paternal grandmother; Other in his paternal grandmother; Prostate cancer in his maternal grandfather; Stroke in his paternal grandmother  He  reports that he has never smoked  His smokeless tobacco use includes chew  He reports current alcohol use  He reports that he does not use drugs  Current Outpatient Medications   Medication Sig Dispense Refill   • lisinopril-hydrochlorothiazide (PRINZIDE,ZESTORETIC) 20-12 5 MG per tablet Take 2 tablets by mouth daily 180 tablet 3   • Multiple Vitamins-Minerals (MULTIVITAMIN ADULT EXTRA C PO) Take by mouth     • potassium chloride (K-DUR,KLOR-CON) 20 mEq tablet Take 1 tablet (20 mEq total) by mouth daily 90 tablet 3   • Semaglutide-Weight Management (WEGOVY) 2 4 MG/0 75ML Inject 0 75 mL (2 4 mg total) under the skin once a week 9 mL 0     No current facility-administered medications for this visit       Review of Systems   Constitutional: Negative for fatigue  Respiratory: Negative for shortness of breath  Cardiovascular: Negative for chest pain and palpitations  Gastrointestinal: Negative for abdominal pain, constipation and diarrhea  Endocrine: Negative for cold intolerance and heat intolerance  Genitourinary: Negative for difficulty urinating  Musculoskeletal: Negative for arthralgias and back pain  Skin: Negative for rash  Neurological: Negative for headaches  Psychiatric/Behavioral: Negative for dysphoric mood  The patient is not nervous/anxious  Objective:    /90   Pulse 80   Resp 16   Ht 6' 1\" (1 854 m)   Wt (!) 141 kg (311 lb 12 8 oz)   BMI 41 14 kg/m²      Physical Exam  Vitals and nursing note reviewed  Constitutional:       General: He is not in acute distress       Appearance: He is " well-developed  He is obese  HENT:      Head: Normocephalic and atraumatic  Eyes:      Conjunctiva/sclera: Conjunctivae normal    Neck:      Thyroid: No thyromegaly  Cardiovascular:      Rate and Rhythm: Normal rate and regular rhythm  Pulmonary:      Effort: Pulmonary effort is normal  No respiratory distress  Skin:     Findings: No rash (visible)  Neurological:      Mental Status: He is alert and oriented to person, place, and time     Psychiatric:         Behavior: Behavior normal

## 2023-06-08 ENCOUNTER — HOSPITAL ENCOUNTER (OUTPATIENT)
Dept: NON INVASIVE DIAGNOSTICS | Facility: CLINIC | Age: 43
Discharge: HOME | End: 2023-06-08
Payer: COMMERCIAL

## 2023-06-08 ENCOUNTER — OFFICE VISIT (OUTPATIENT)
Dept: CARDIOLOGY CLINIC | Facility: CLINIC | Age: 43
End: 2023-06-08
Payer: COMMERCIAL

## 2023-06-08 VITALS
HEART RATE: 60 BPM | BODY MASS INDEX: 41.22 KG/M2 | HEIGHT: 73 IN | SYSTOLIC BLOOD PRESSURE: 130 MMHG | WEIGHT: 311 LBS | DIASTOLIC BLOOD PRESSURE: 80 MMHG

## 2023-06-08 VITALS
HEART RATE: 76 BPM | DIASTOLIC BLOOD PRESSURE: 76 MMHG | SYSTOLIC BLOOD PRESSURE: 134 MMHG | WEIGHT: 311 LBS | BODY MASS INDEX: 41.22 KG/M2 | HEIGHT: 73 IN

## 2023-06-08 DIAGNOSIS — I48.0 PAF (PAROXYSMAL ATRIAL FIBRILLATION) (HCC): ICD-10-CM

## 2023-06-08 DIAGNOSIS — I10 PRIMARY HYPERTENSION: ICD-10-CM

## 2023-06-08 DIAGNOSIS — G47.33 OSA (OBSTRUCTIVE SLEEP APNEA): ICD-10-CM

## 2023-06-08 DIAGNOSIS — Q23.1 BICUSPID AORTIC VALVE: Primary | ICD-10-CM

## 2023-06-08 DIAGNOSIS — Q23.1 BICUSPID AORTIC VALVE: ICD-10-CM

## 2023-06-08 DIAGNOSIS — I77.810 DILATED AORTIC ROOT (HCC): ICD-10-CM

## 2023-06-08 LAB
AORTIC ROOT: 3.9 CM
AORTIC VALVE MEAN VELOCITY: 21.1 M/S
APICAL FOUR CHAMBER EJECTION FRACTION: 47 %
ASCENDING AORTA: 3.9 CM
AV LVOT MEAN GRADIENT: 2 MMHG
AV LVOT PEAK GRADIENT: 3 MMHG
AV MEAN GRADIENT: 20 MMHG
AV PEAK GRADIENT: 32 MMHG
AV VELOCITY RATIO: 0.29
DOP CALC AO PEAK VEL: 2.9 M/S
DOP CALC AO VTI: 68.25 CM
DOP CALC LVOT PEAK VEL VTI: 21.59 CM
DOP CALC LVOT PEAK VEL: 0.85 M/S
FRACTIONAL SHORTENING: 30 % (ref 28–44)
INTERVENTRICULAR SEPTUM IN DIASTOLE (PARASTERNAL SHORT AXIS VIEW): 1.2 CM
INTERVENTRICULAR SEPTUM: 1.2 CM (ref 0.6–1.1)
LAAS-AP4: 16.3 CM2
LEFT ATRIUM SIZE: 3.1 CM
LEFT INTERNAL DIMENSION IN SYSTOLE: 3.9 CM (ref 2.1–4)
LEFT VENTRICULAR INTERNAL DIMENSION IN DIASTOLE: 5.6 CM (ref 3.5–6)
LEFT VENTRICULAR POSTERIOR WALL IN END DIASTOLE: 1.2 CM
LEFT VENTRICULAR STROKE VOLUME: 88 ML
LVSV (TEICH): 88 ML
RIGHT ATRIUM AREA SYSTOLE A4C: 25.3 CM2
RIGHT VENTRICLE ID DIMENSION: 4.2 CM
SL CV LV EF: 65
SL CV PED ECHO LEFT VENTRICLE DIASTOLIC VOLUME (MOD BIPLANE) 2D: 153 ML
SL CV PED ECHO LEFT VENTRICLE SYSTOLIC VOLUME (MOD BIPLANE) 2D: 65 ML
TR MAX PG: 22 MMHG
TR PEAK VELOCITY: 2.4 M/S
TRICUSPID ANNULAR PLANE SYSTOLIC EXCURSION: 2.5 CM
TRICUSPID VALVE PEAK REGURGITATION VELOCITY: 2.35 M/S

## 2023-06-08 PROCEDURE — 93306 TTE W/DOPPLER COMPLETE: CPT

## 2023-06-08 PROCEDURE — 93306 TTE W/DOPPLER COMPLETE: CPT | Performed by: INTERNAL MEDICINE

## 2023-06-08 PROCEDURE — 99213 OFFICE O/P EST LOW 20 MIN: CPT | Performed by: INTERNAL MEDICINE

## 2023-06-08 NOTE — PROGRESS NOTES
Subjective:        Patient ID: Kameron Turner is a 37 y o  male  Chief Complaint:  Britt Bennett is here for routine follow-up, echo today revealed stable mild aortic root dilatation, normal LV systolic function, and moderate aortic stenosis, gradient has progressed from a mean of 13 mmHg back in 2019 to 20 mmHg today  He has no symptoms of aortic stenosis on extensive questioning, actually offers no symptoms whatsoever no loss of exercise tolerance, staying active  No evidence for recurrent A-fib wearing his smart watch  The following portions of the patient's history were reviewed and updated as appropriate: allergies, current medications, past family history, past medical history, past social history, past surgical history and problem list   Review of Systems   Constitutional: Negative for chills, diaphoresis, malaise/fatigue and weight gain  HENT: Negative for nosebleeds and stridor  Eyes: Negative for double vision, vision loss in left eye, vision loss in right eye and visual disturbance  Cardiovascular: Negative for chest pain, claudication, cyanosis, dyspnea on exertion, irregular heartbeat, leg swelling, near-syncope, orthopnea, palpitations, paroxysmal nocturnal dyspnea and syncope  Respiratory: Negative for cough, shortness of breath, snoring and wheezing  Endocrine: Negative for polydipsia, polyphagia and polyuria  Hematologic/Lymphatic: Negative for bleeding problem  Does not bruise/bleed easily  Skin: Negative for flushing and rash  Musculoskeletal: Negative for falls and myalgias  Gastrointestinal: Negative for abdominal pain, heartburn, hematemesis, hematochezia, melena and nausea  Genitourinary: Negative for hematuria  Neurological: Negative for brief paralysis, dizziness, focal weakness, headaches, light-headedness, loss of balance and vertigo  Psychiatric/Behavioral: Negative for altered mental status and substance abuse  Allergic/Immunologic: Negative for hives  "         Objective:      /80 (BP Location: Left arm, Patient Position: Sitting)   Pulse 60   Ht 6' 1\" (1 854 m)   Wt (!) 141 kg (311 lb)   BMI 41 03 kg/m²   Physical Exam  Constitutional:       General: He is not in acute distress  Appearance: He is well-developed  He is not diaphoretic  HENT:      Head: Normocephalic and atraumatic  Eyes:      General: No scleral icterus  Pupils: Pupils are equal, round, and reactive to light  Neck:      Thyroid: No thyromegaly  Vascular: No carotid bruit or JVD  Cardiovascular:      Rate and Rhythm: Normal rate and regular rhythm  Heart sounds: Murmur heard  No friction rub  No gallop  Pulmonary:      Effort: Pulmonary effort is normal  No respiratory distress  Breath sounds: Normal breath sounds  No stridor  No wheezing or rales  Abdominal:      General: Bowel sounds are normal  There is no distension  Palpations: Abdomen is soft  There is no mass  Tenderness: There is no abdominal tenderness  Musculoskeletal:         General: No deformity  Normal range of motion  Cervical back: Normal range of motion and neck supple  Right lower leg: No edema  Left lower leg: No edema  Skin:     General: Skin is warm and dry  Coloration: Skin is not pale  Findings: No erythema  Neurological:      Mental Status: He is alert and oriented to person, place, and time  Coordination: Coordination normal    Psychiatric:         Mood and Affect: Mood normal          Behavior: Behavior normal          Thought Content:  Thought content normal          Judgment: Judgment normal          Lab Review:   Hospital Outpatient Visit on 06/08/2023   Component Date Value   • LA size 06/08/2023 3 1    • Aortic valve mean veloci* 06/08/2023 21 10    • Triscuspid Valve Regurgi* 06/08/2023 22 0    • Tricuspid valve peak reg* 06/08/2023 2 35    • LVPWd 06/08/2023 1 20    • Left Atrium Area-systoli* 06/08/2023 16 3    • " Tricuspid annular plane * 06/08/2023 2 50    • TR Peak Vikas 06/08/2023 2 4    • IVSd 06/08/2023 4 65    • LV DIASTOLIC VOLUME (MOD* 86/72/8901 153    • LEFT VENTRICLE SYSTOLIC * 18/89/7544 65    • Left ventricular stroke * 06/08/2023 88 00    • A4C EF 06/08/2023 47    • LVIDd 06/08/2023 5 60    • IVS 06/08/2023 1 2    • LVIDS 06/08/2023 3 90    • FS 06/08/2023 30    • Ao root 06/08/2023 3 90    • RVID d 06/08/2023 4 2    • LVOT mn grad 06/08/2023 2 0    • AV mean gradient 06/08/2023 20    • AV LVOT peak gradient 06/08/2023 3    • LVOT peak vikas 06/08/2023 0 85    • LVOT peak VTI 06/08/2023 21 59    • Aortic valve peak veloci* 06/08/2023 2 9    • Ao VTI 06/08/2023 68 25    • AV peak gradient 06/08/2023 32    • RAA A4C 06/08/2023 25 3    • LVSV, 2D 06/08/2023 88    • DVI 06/08/2023 0 29    • LV EF 06/08/2023 65    • Asc Ao 06/08/2023 3 9    Appointment on 02/18/2023   Component Date Value   • Sodium 02/18/2023 136    • Potassium 02/18/2023 4 1    • Chloride 02/18/2023 104    • CO2 02/18/2023 25    • ANION GAP 02/18/2023 7    • BUN 02/18/2023 20    • Creatinine 02/18/2023 1 22    • Glucose, Fasting 02/18/2023 88    • Calcium 02/18/2023 9 1    • AST 02/18/2023 12    • ALT 02/18/2023 23    • Alkaline Phosphatase 02/18/2023 49    • Total Protein 02/18/2023 6 7    • Albumin 02/18/2023 3 9    • Total Bilirubin 02/18/2023 0 39    • eGFR 02/18/2023 72      Echo complete w/ contrast if indicated    Result Date: 6/8/2023  Narrative: •  Left Ventricle: Left ventricular cavity size is mildly dilated  Wall thickness is mildly increased  There is mild concentric hypertrophy  The left ventricular ejection fraction is 65%  Systolic function is low normal  Wall motion is normal  •  Right Ventricle: Right ventricular cavity size is mildly dilated  Systolic function is normal  •  Right Atrium: The atrium is mildly dilated  •  Aortic Valve: The aortic valve is congenitally bicuspid  The leaflets are moderately thickened   The leaflets are moderately calcified  There is moderately reduced mobility  There is mild to moderate regurgitation  There is moderate stenosis  The aortic valve peak velocity is 2 9 m/s  The aortic valve mean gradient is 20 mmHg  •  Mitral Valve: There is mild regurgitation  •  Tricuspid Valve: There is mild regurgitation  •  Aorta: The aortic root is mildly dilated  The ascending aorta is mildly dilated  The aortic root is 3 90 cm  The ascending aorta is 3 9 cm  •  Pericardium: There is no pericardial effusion  Assessment:       1  Bicuspid aortic valve        2  PAF (paroxysmal atrial fibrillation) ([facility])  Echo complete w/ contrast if indicated      3  Primary hypertension  Echo complete w/ contrast if indicated      4  TARAH (obstructive sleep apnea)        5  Dilated aortic root ([address] )             Plan:       Zoila Lechuga has no signs or symptoms reminiscent of angina pectoris, heart failure, nor electrical instability  Aortic stenosis remains asymptomatic, is moderate in degree, no indication for intervention at this time, currently no medical therapy for such  SBE antibiotic prophylaxis not required  Explained natural progression of aortic stenosis and potential symptoms moving forward  He wears his sleep apnea nightly  Blood pressure is well controlled, aortic root stable dimension, likely prominent due to his body habitus and longstanding hypertension  No evidence for recurrent atrial fibrillation, clinically examining in sinus rhythm, low stroke risk score, no new anticoagulation recommendations here  Advised to continue with his present antihypertensive regimen, we will reserve low threshold to consider beta-blocker therapy should recurrent atrial fibrillation arise  I made no changes today, ordered no acute cardiac testing, we will see him back in 1 year with echocardiographic follow-up

## 2023-06-08 NOTE — PATIENT INSTRUCTIONS
Cholesterol and Your Health   AMBULATORY CARE:   Cholesterol  is a waxy, fat-like substance  Your body uses cholesterol to make hormones and new cells, and to protect nerves  Cholesterol is made by your body  It also comes from certain foods you eat, such as meat and dairy products  Your healthcare provider can help you set goals for your cholesterol levels  He or she can help you create a plan to meet your goals  Cholesterol level goals: Your cholesterol level goals depend on your risk for heart disease, your age, and your other health conditions  The following are general guidelines: Total cholesterol  includes low-density lipoprotein (LDL), high-density lipoprotein (HDL), and triglyceride levels  The total cholesterol level should be lower than 200 mg/dL and is best at about 150 mg/dL  LDL cholesterol  is called bad cholesterol  because it forms plaque in your arteries  As plaque builds up, your arteries become narrow, and less blood flows through  When plaque decreases blood flow to your heart, you may have chest pain  If plaque completely blocks an artery that brings blood to your heart, you may have a heart attack  Plaque can break off and form blood clots  Blood clots may block arteries in your brain and cause a stroke  The level should be less than 130 mg/dL and is best at about 100 mg/dL  HDL cholesterol  is called good cholesterol  because it helps remove LDL cholesterol from your arteries  It does this by attaching to LDL cholesterol and carrying it to your liver  Your liver breaks down LDL cholesterol so your body can get rid of it  High levels of HDL cholesterol can help prevent a heart attack and stroke  Low levels of HDL cholesterol can increase your risk for heart disease, heart attack, and stroke  The level should be 60 mg/dL or higher  Triglycerides  are a type of fat that store energy from foods you eat  High levels of triglycerides also cause plaque buildup   This can increase your risk for a heart attack or stroke  If your triglyceride level is high, your LDL cholesterol level may also be high  The level should be less than 150 mg/dL  Any of the following can increase your risk for high cholesterol:   Smoking cigarettes    Being overweight or obese, or not getting enough exercise    Drinking large amounts of alcohol    A medical condition such as hypertension (high blood pressure) or diabetes    Certain genes passed from your parents to you    Age older than 65 years    What you need to know about having your cholesterol levels checked: Adults 21to 39years of age should have their cholesterol levels checked every 4 to 6 years  Adults 45 years or older should have their cholesterol checked every 1 to 2 years  You may need your cholesterol checked more often, or at a younger age, if you have risk factors for heart disease  You may also need to have your cholesterol checked more often if you have other health conditions, such as diabetes  Blood tests are used to check cholesterol levels  Blood tests measure your levels of triglycerides, LDL cholesterol, and HDL cholesterol  How healthy fats affect your cholesterol levels:  Healthy fats, also called unsaturated fats, help lower LDL cholesterol and triglyceride levels  Healthy fats include the following:  Monounsaturated fats  are found in foods such as olive oil, canola oil, avocado, nuts, and olives  Polyunsaturated fats,  such as omega 3 fats, are found in fish, such as salmon, trout, and tuna  They can also be found in plant foods such as flaxseed, walnuts, and soybeans  How unhealthy fats affect your cholesterol levels:  Unhealthy fats increase LDL cholesterol and triglyceride levels  They are found in foods high in cholesterol, saturated fat, and trans fat:  Cholesterol  is found in eggs, dairy, and meat  Saturated fat  is found in butter, cheese, ice cream, whole milk, and coconut oil   Saturated fat is also found in meat, such as sausage, hot dogs, and bologna  Trans fat  is found in liquid oils and is used in fried and baked foods  Foods that contain trans fats include chips, crackers, muffins, sweet rolls, microwave popcorn, and cookies  Treatment  for high cholesterol will also decrease your risk of heart disease, heart attack, and stroke  Treatment may include any of the following:  Lifestyle changes  may include food, exercise, weight loss, and quitting smoking  You may also need to decrease the amount of alcohol you drink  Your healthcare provider will want you to start with lifestyle changes  Other treatment may be added if lifestyle changes are not enough  Your healthcare provider may recommend you work with a team to manage hyperlipidemia  The team may include medical experts such as a dietitian, an exercise or physical therapist, and a behavior therapist  Your family members may be included in helping you create lifestyle changes  Medicines  may be given to lower your LDL cholesterol, triglyceride levels, or total cholesterol level  You may need medicines to lower your cholesterol if any of the following is true:    You have a history of stroke, TIA, unstable angina, or a heart attack  Your LDL cholesterol level is 190 mg/dL or higher  You are age 36 to 76 years, have diabetes or heart disease risk factors, and your LDL cholesterol is 70 mg/dL or higher  Supplements  include fish oil, red yeast rice, and garlic  Fish oil may help lower your triglyceride and LDL cholesterol levels  It may also increase your HDL cholesterol level  Red yeast rice may help decrease your total cholesterol level and LDL cholesterol level  Garlic may help lower your total cholesterol level  Do not take any supplements without talking to your healthcare provider  Food changes you can make to lower your cholesterol levels:  A dietitian can help you create a healthy eating plan   He or she can show you how to read food labels and choose foods low in saturated fat, trans fats, and cholesterol  Decrease the total amount of fat you eat  Choose lean meats, fat-free or 1% fat milk, and low-fat dairy products, such as yogurt and cheese  Try to limit or avoid red meats  Limit or do not eat fried foods or baked goods, such as cookies  Replace unhealthy fats with healthy fats  Cook foods in olive oil or canola oil  Choose soft margarines that are low in saturated fat and trans fat  Seeds, nuts, and avocados are other examples of healthy fats  Eat foods with omega-3 fats  Examples include salmon, tuna, mackerel, walnuts, and flaxseed  Eat fish 2 times per week  Pregnant women should not eat fish that have high levels of mercury, such as shark, swordfish, and jeremias mackerel  Increase the amount of high-fiber foods you eat  High-fiber foods can help lower your LDL cholesterol  Aim to get between 20 and 30 grams of fiber each day  Fruits and vegetables are high in fiber  Eat at least 5 servings each day  Other high-fiber foods are whole-grain or whole-wheat breads, pastas, or cereals, and brown rice  Eat 3 ounces of whole-grain foods each day  Increase fiber slowly  You may have abdominal discomfort, bloating, and gas if you add fiber to your diet too quickly  Eat healthy protein foods  Examples include low-fat dairy products, skinless chicken and turkey, fish, and nuts  Limit foods and drinks that are high in sugar  Your dietitian or healthcare provider can help you create daily limits for high-sugar foods and drinks  The limit may be lower if you have diabetes or another health condition  Limits can also help you lose weight if needed  Lifestyle changes you can make to lower your cholesterol levels:   Maintain a healthy weight  Ask your healthcare provider what a healthy weight is for you  Ask him or her to help you create a weight loss plan if needed   Weight loss can decrease your total cholesterol and triglyceride levels  Weight loss may also help keep your blood pressure at a healthy level  Be physically active throughout the day  Physical activity, such as exercise, can help lower your total cholesterol level and maintain a healthy weight  Physical activity can also help increase your HDL cholesterol level  Work with your healthcare provider to create an program that is right for you  Get at least 30 to 40 minutes of moderate physical activity most days of the week  Examples of exercise include brisk walking, swimming, or biking  Also include strength training at least 2 times each week  Your healthcare providers can help you create a physical activity plan  Do not smoke  Nicotine and other chemicals in cigarettes and cigars can raise your cholesterol levels  Ask your healthcare provider for information if you currently smoke and need help to quit  E-cigarettes or smokeless tobacco still contain nicotine  Talk to your healthcare provider before you use these products  Limit or do not drink alcohol  Alcohol can increase your triglyceride levels  Ask your healthcare provider before you drink alcohol  Ask how much is okay for you to drink in 24 hours or 1 week  Follow up with your doctor as directed:  Write down your questions so you remember to ask them during your visits  © Copyright [employer] 2022 Information is for End User's use only and may not be sold, redistributed or otherwise used for commercial purposes  The above information is an  only  It is not intended as medical advice for individual conditions or treatments  Talk to your doctor, nurse or pharmacist before following any medical regimen to see if it is safe and effective for you

## 2023-09-19 ENCOUNTER — TELEPHONE (OUTPATIENT)
Dept: BARIATRICS | Facility: CLINIC | Age: 43
End: 2023-09-19

## 2023-09-19 DIAGNOSIS — E66.01 MORBID OBESITY WITH BMI OF 40.0-44.9, ADULT (HCC): Primary | ICD-10-CM

## 2023-10-04 ENCOUNTER — OFFICE VISIT (OUTPATIENT)
Dept: BARIATRICS | Facility: CLINIC | Age: 43
End: 2023-10-04
Payer: COMMERCIAL

## 2023-10-04 VITALS
BODY MASS INDEX: 39.71 KG/M2 | SYSTOLIC BLOOD PRESSURE: 137 MMHG | HEIGHT: 73 IN | RESPIRATION RATE: 17 BRPM | WEIGHT: 299.6 LBS | DIASTOLIC BLOOD PRESSURE: 94 MMHG | HEART RATE: 83 BPM

## 2023-10-04 DIAGNOSIS — E78.2 MIXED HYPERLIPIDEMIA: ICD-10-CM

## 2023-10-04 DIAGNOSIS — G47.33 OSA (OBSTRUCTIVE SLEEP APNEA): ICD-10-CM

## 2023-10-04 DIAGNOSIS — E66.01 MORBID OBESITY WITH BMI OF 40.0-44.9, ADULT (HCC): ICD-10-CM

## 2023-10-04 DIAGNOSIS — I10 PRIMARY HYPERTENSION: ICD-10-CM

## 2023-10-04 DIAGNOSIS — E66.9 OBESITY, CLASS II, BMI 35-39.9: Primary | ICD-10-CM

## 2023-10-04 PROBLEM — E66.812 OBESITY, CLASS II, BMI 35-39.9: Status: ACTIVE | Noted: 2019-05-13

## 2023-10-04 PROCEDURE — 99214 OFFICE O/P EST MOD 30 MIN: CPT | Performed by: PHYSICIAN ASSISTANT

## 2023-10-04 NOTE — PROGRESS NOTES
Assessment/Plan:    Obesity, Class II, BMI 35-39.9  -Patient is pursuing conservative program  - Not interested in weight loss surgery.  -Initial weight loss goal of 5-10% weight loss for improved health      - To continue on wegovy 10.3 % weight loss. Denies any side effects. Patient denies personal and family history of MCT and MEN2 tumors. Patient denies personal history of pancreatitis. Other medication options:Avoid phentermine due to hypertension, has failed wellbutrin in the past. He was on Saxenda. Start weight 334 on 4/26/22. Continued with injection site reaction so was switched to [facility] on 8/15/22 (312)         Initial:334  Last visit: 309.4 lbs BMI 40.82  Current:299.6  Change:-34.4 lbs (-9.8lb lbs since the last office visit)  Goal: 250     Goals:  -continue with water intake and to ensure enough protein with meals. -recommend adding formal exercise on, specifically strength training    Hypertension  Slightly elevated today. On prinzide and has PCP followup 10/31    TARAH (obstructive sleep apnea)  Continue CPAP. May improve with weight loss    Hyperlipidemia  -should improve with weight loss, dietary, and lifestyle changes      Follow up in approximately 4 months with Non-Surgical Physician/Advanced Practitioner. Diagnoses and all orders for this visit:    Obesity, Class II, BMI 35-39.9    Morbid obesity with BMI of 40.0-44.9, adult (HCC)  -     Semaglutide-Weight Management (WEGOVY) 2.4 MG/0.75ML; Inject 0.75 mL (2.4 mg total) under the skin once a week    Primary hypertension    TARAH (obstructive sleep apnea)    Mixed hyperlipidemia          Subjective:   Chief Complaint   Patient presents with   • Follow-up     MWM 4m f/u' [address]        Patient ID: Shin Blandon  is a 37 y.o. male with excess weight/obesity here to pursue weight managment. Patient is pursuing Conservative Program.     HPI  Here for MWM followup. On wegovy 2.4mg. Denies any side effects.   He does feel it has reduced his appetite. He is usually eating 2 meals a day. Does include protein with his meals but limited vegetables. Wt Readings from Last 10 Encounters:   10/04/23 136 kg (299 lb 9.6 oz)   06/08/23 (!) 141 kg (311 lb)   06/08/23 (!) 141 kg (311 lb)   06/07/23 (!) 141 kg (311 lb 12.8 oz)   04/27/23 (!) 140 kg (309 lb)   03/07/23 (!) 140 kg (309 lb 6.4 oz)   11/07/22 (!) 141 kg (311 lb)   10/28/22 (!) 144 kg (317 lb 6.4 oz)   09/19/22 (!) 140 kg (309 lb)   08/15/22 (!) 142 kg (312 lb 3.2 oz)       Food logging:no,    Increased appetite/cravings:controlled  Fruit/Vegetable servings:  Exercise:nothing formal, trying to get extra walking in  Hydration:water more than 64 oz day    Diet Recall:   Usually either breakfast or lunch (can be sandwich)   Dinner: protein, starch, vegetable    Colonoscopy-Not applicable    The following portions of the patient's history were reviewed and updated as appropriate:   He  has a past medical history of Aortic valve cusp abnormality, Atrial fibrillation ([address]), and Hypertension. He   Patient Active Problem List    Diagnosis Date Noted   • TARAH (obstructive sleep apnea)    • Aortic aneurysm of unspecified site, without rupture ([address]) 07/28/2021   • Morbid obesity ([address]) 02/04/2021   • Visit for preventive health examination 04/24/2020   • PAF (paroxysmal atrial fibrillation) ([address]) 05/20/2019   • Obesity, Class II, BMI 35-39.9 05/13/2019   • Labral tear of shoulder, right, subsequent encounter 08/27/2018   • Bicuspid aortic valve 11/22/2017   • Hypertension 11/22/2017   • SLAP tear of shoulder 05/26/2016   • Hyperlipidemia 01/02/2015     He  has a past surgical history that includes Anterior cruciate ligament repair (Right); Vasectomy; Other surgical history; pr surgical arthroscopy shoulder repair slap lesion (Left, 5/26/2016); Knee arthroscopy (Right); Rotator cuff repair (Left); and pr surgical arthroscopy shoulder repair slap lesion (Right, 11/26/2018).   His family history includes Breast cancer in his mother; Coronary artery disease in his paternal grandfather; Diabetes type II in his paternal grandfather; Hyperlipidemia in his paternal grandfather and paternal grandmother; Hypertension in his father, paternal grandfather, and paternal grandmother; Other in his paternal grandmother; Prostate cancer in his maternal grandfather; Stroke in his paternal grandmother. He  reports that he has never smoked. His smokeless tobacco use includes chew. He reports current alcohol use. He reports that he does not use drugs. Current Outpatient Medications   Medication Sig Dispense Refill   • lisinopril-hydrochlorothiazide (PRINZIDE,ZESTORETIC) 20-12.5 MG per tablet Take 2 tablets by mouth daily 180 tablet 3   • Multiple Vitamins-Minerals (MULTIVITAMIN ADULT EXTRA C PO) Take by mouth     • potassium chloride (K-DUR,KLOR-CON) 20 mEq tablet Take 1 tablet (20 mEq total) by mouth daily 90 tablet 3   • Semaglutide-Weight Management (WEGOVY) 2.4 MG/0.75ML Inject 0.75 mL (2.4 mg total) under the skin once a week 9 mL 1     No current facility-administered medications for this visit. Current Outpatient Medications on File Prior to Visit   Medication Sig   • lisinopril-hydrochlorothiazide (PRINZIDE,ZESTORETIC) 20-12.5 MG per tablet Take 2 tablets by mouth daily   • Multiple Vitamins-Minerals (MULTIVITAMIN ADULT EXTRA C PO) Take by mouth   • potassium chloride (K-DUR,KLOR-CON) 20 mEq tablet Take 1 tablet (20 mEq total) by mouth daily   • [DISCONTINUED] Semaglutide-Weight Management (WEGOVY) 2.4 MG/0.75ML Inject 0.75 mL (2.4 mg total) under the skin once a week     No current facility-administered medications on file prior to visit. He is allergic to penicillins. .    Review of Systems   Constitutional: Negative for fatigue. Respiratory: Negative for shortness of breath. Cardiovascular: Negative for chest pain and palpitations. Gastrointestinal: Negative for abdominal pain, constipation and diarrhea. Endocrine: Negative for cold intolerance and heat intolerance. Genitourinary: Negative for difficulty urinating. Skin: Negative for rash. Neurological: Negative for headaches. Psychiatric/Behavioral: Negative for dysphoric mood. The patient is not nervous/anxious. Objective:    /94   Pulse 83   Resp 17   Ht 6' 1" (1.854 m)   Wt 136 kg (299 lb 9.6 oz)   BMI 39.53 kg/m²      Physical Exam  Vitals and nursing note reviewed. Constitutional:       General: He is not in acute distress. Appearance: He is well-developed. He is obese. HENT:      Head: Normocephalic and atraumatic. Eyes:      Conjunctiva/sclera: Conjunctivae normal.   Neck:      Thyroid: No thyromegaly. Pulmonary:      Effort: Pulmonary effort is normal. No respiratory distress. Skin:     Findings: No rash (visible). Neurological:      Mental Status: He is alert and oriented to person, place, and time.    Psychiatric:         Mood and Affect: Mood normal.         Behavior: Behavior normal.

## 2023-10-04 NOTE — ASSESSMENT & PLAN NOTE
-Patient is pursuing conservative program  - Not interested in weight loss surgery.  -Initial weight loss goal of 5-10% weight loss for improved health      - To continue on wegovy 10.3 % weight loss. Denies any side effects. Patient denies personal and family history of MCT and MEN2 tumors. Patient denies personal history of pancreatitis. Other medication options:Avoid phentermine due to hypertension, has failed wellbutrin in the past. He was on Saxenda. Start weight 334 on 4/26/22. Continued with injection site reaction so was switched to [facility] on 8/15/22 (312)         Initial:334  Last visit: 309.4 lbs BMI 40.82  Current:299.6  Change:-34.4 lbs (-9.8lb lbs since the last office visit)  Goal: 250     Goals:  -continue with water intake and to ensure enough protein with meals.   -recommend adding formal exercise on, specifically strength training

## 2023-10-05 DIAGNOSIS — E87.6 HYPOKALEMIA: ICD-10-CM

## 2023-10-05 DIAGNOSIS — I10 PRIMARY HYPERTENSION: ICD-10-CM

## 2023-10-05 RX ORDER — POTASSIUM CHLORIDE 1500 MG/1
20 TABLET, EXTENDED RELEASE ORAL DAILY
Qty: 90 TABLET | Refills: 3 | Status: SHIPPED | OUTPATIENT
Start: 2023-10-05

## 2023-10-05 RX ORDER — LISINOPRIL AND HYDROCHLOROTHIAZIDE 20; 12.5 MG/1; MG/1
2 TABLET ORAL DAILY
Qty: 180 TABLET | Refills: 3 | Status: SHIPPED | OUTPATIENT
Start: 2023-10-05

## 2023-10-31 ENCOUNTER — OFFICE VISIT (OUTPATIENT)
Dept: FAMILY MEDICINE CLINIC | Facility: CLINIC | Age: 43
End: 2023-10-31
Payer: COMMERCIAL

## 2023-10-31 VITALS
TEMPERATURE: 97.5 F | SYSTOLIC BLOOD PRESSURE: 126 MMHG | DIASTOLIC BLOOD PRESSURE: 72 MMHG | HEIGHT: 73 IN | RESPIRATION RATE: 18 BRPM | HEART RATE: 63 BPM | WEIGHT: 307 LBS | OXYGEN SATURATION: 96 % | BODY MASS INDEX: 40.69 KG/M2

## 2023-10-31 DIAGNOSIS — Z23 ENCOUNTER FOR IMMUNIZATION: Primary | ICD-10-CM

## 2023-10-31 DIAGNOSIS — Z00.00 ANNUAL PHYSICAL EXAM: ICD-10-CM

## 2023-10-31 PROCEDURE — 90471 IMMUNIZATION ADMIN: CPT | Performed by: INTERNAL MEDICINE

## 2023-10-31 PROCEDURE — 99396 PREV VISIT EST AGE 40-64: CPT | Performed by: INTERNAL MEDICINE

## 2023-10-31 PROCEDURE — 3725F SCREEN DEPRESSION PERFORMED: CPT | Performed by: INTERNAL MEDICINE

## 2023-10-31 PROCEDURE — 90686 IIV4 VACC NO PRSV 0.5 ML IM: CPT | Performed by: INTERNAL MEDICINE

## 2023-10-31 NOTE — PATIENT INSTRUCTIONS
Wellness Visit for Adults   AMBULATORY CARE:   A wellness visit  is when you see your healthcare provider to get screened for health problems. Your healthcare provider will also give you advice on how to stay healthy. Write down your questions so you remember to ask them. Ask your healthcare provider how often you should have a wellness visit. What happens at a wellness visit:  Your healthcare provider will ask about your health, and your family history of health problems. This includes high blood pressure, heart disease, and cancer. He or she will ask if you have symptoms that concern you, if you smoke, and about your mood. You may also be asked about your intake of medicines, supplements, food, and alcohol. Any of the following may be done: Your weight  will be checked. Your height may also be checked so your body mass index (BMI) can be calculated. Your BMI shows if you are at a healthy weight. Your blood pressure  and heart rate will be checked. Your temperature may also be checked. Blood and urine tests  may be done. Blood tests may be done to check your cholesterol levels. Abnormal cholesterol levels increase your risk for heart disease and stroke. You may also need a blood or urine test to check for diabetes if you are at increased risk. Urine tests may be done to look for signs of an infection or kidney disease. A physical exam  includes checking your heartbeat and lungs with a stethoscope. Your healthcare provider may also check your skin to look for sun damage. Screening tests  may be recommended. A screening test is done to check for diseases that may not cause symptoms. The screening tests you may need depend on your age, gender, family history, and lifestyle habits. For example, colorectal screening may be recommended if you are 48years old or older. Screening tests you need if you are a woman:   A Pap smear  is used to screen for cervical cancer.  Pap smears are usually done every 3 to 5 years depending on your age. You may need them more often if you have had abnormal Pap smear test results in the past. Ask your healthcare provider how often you should have a Pap smear. A mammogram  is an x-ray of your breasts to screen for breast cancer. Experts recommend mammograms every 2 years starting at age 48 years. You may need a mammogram at age 52 years or younger if you have an increased risk for breast cancer. Talk to your healthcare provider about when you should start having mammograms and how often you need them. Vaccines you may need:   Get an influenza vaccine  every year. The influenza vaccine protects you from the flu. Several types of viruses cause the flu. The viruses change over time, so new vaccines are made each year. Get a tetanus-diphtheria (Td) booster vaccine  every 10 years. This vaccine protects you against tetanus and diphtheria. Tetanus is a severe infection that may cause painful muscle spasms and lockjaw. Diphtheria is a severe bacterial infection that causes a thick covering in the back of your mouth and throat. Get a human papillomavirus (HPV) vaccine  if you are female and aged 23 to 32 or male 23 to 24 and never received it. This vaccine protects you from HPV infection. HPV is the most common infection spread by sexual contact. HPV may also cause vaginal, penile, and anal cancers. Get a pneumococcal vaccine  if you are aged 72 years or older. The pneumococcal vaccine is an injection given to protect you from pneumococcal disease. Pneumococcal disease is an infection caused by pneumococcal bacteria. The infection may cause pneumonia, meningitis, or an ear infection. Get a shingles vaccine  if you are 60 or older, even if you have had shingles before. The shingles vaccine is an injection to protect you from the varicella-zoster virus. This is the same virus that causes chickenpox.  Shingles is a painful rash that develops in people who had chickenpox or have been exposed to the virus. How to eat healthy:  My Plate is a model for planning healthy meals. It shows the types and amounts of foods that should go on your plate. Fruits and vegetables make up about half of your plate, and grains and protein make up the other half. A serving of dairy is included on the side of your plate. The amount of calories and serving sizes you need depends on your age, gender, weight, and height. Examples of healthy foods are listed below:  Eat a variety of vegetables  such as dark green, red, and orange vegetables. You can also include canned vegetables low in sodium (salt) and frozen vegetables without added butter or sauces. Eat a variety of fresh fruits , canned fruit in 100% juice, frozen fruit, and dried fruit. Include whole grains. At least half of the grains you eat should be whole grains. Examples include whole-wheat bread, wheat pasta, brown rice, and whole-grain cereals such as oatmeal.    Eat a variety of protein foods such as seafood (fish and shellfish), lean meat, and poultry without skin (turkey and chicken). Examples of lean meats include pork leg, shoulder, or tenderloin, and beef round, sirloin, tenderloin, and extra lean ground beef. Other protein foods include eggs and egg substitutes, beans, peas, soy products, nuts, and seeds. Choose low-fat dairy products such as skim or 1% milk or low-fat yogurt, cheese, and cottage cheese. Limit unhealthy fats  such as butter, hard margarine, and shortening. Exercise:  Exercise at least 30 minutes per day on most days of the week. Some examples of exercise include walking, biking, dancing, and swimming. You can also fit in more physical activity by taking the stairs instead of the elevator or parking farther away from stores. Include muscle strengthening activities 2 days each week. Regular exercise provides many health benefits.  It helps you manage your weight, and decreases your risk for type 2 diabetes, heart disease, stroke, and high blood pressure. Exercise can also help improve your mood. Ask your healthcare provider about the best exercise plan for you. General health and safety guidelines:   Do not smoke. Nicotine and other chemicals in cigarettes and cigars can cause lung damage. Ask your healthcare provider for information if you currently smoke and need help to quit. E-cigarettes or smokeless tobacco still contain nicotine. Talk to your healthcare provider before you use these products. Limit alcohol. A drink of alcohol is 12 ounces of beer, 5 ounces of wine, or 1½ ounces of liquor. Lose weight, if needed. Being overweight increases your risk of certain health conditions. These include heart disease, high blood pressure, type 2 diabetes, and certain types of cancer. Protect your skin. Do not sunbathe or use tanning beds. Use sunscreen with a SPF 15 or higher. Apply sunscreen at least 15 minutes before you go outside. Reapply sunscreen every 2 hours. Wear protective clothing, hats, and sunglasses when you are outside. Drive safely. Always wear your seatbelt. Make sure everyone in your car wears a seatbelt. A seatbelt can save your life if you are in an accident. Do not use your cell phone when you are driving. This could distract you and cause an accident. Pull over if you need to make a call or send a text message. Practice safe sex. Use latex condoms if are sexually active and have more than one partner. Your healthcare provider may recommend screening tests for sexually transmitted infections (STIs). Wear helmets, lifejackets, and protective gear. Always wear a helmet when you ride a bike or motorcycle, go skiing, or play sports that could cause a head injury. Wear protective equipment when you play sports. Wear a lifejacket when you are on a boat or doing water sports.     © Copyright Johanna Pat 2023 Information is for End User's use only and may not be sold, redistributed or otherwise used for commercial purposes. The above information is an  only. It is not intended as medical advice for individual conditions or treatments. Talk to your doctor, nurse or pharmacist before following any medical regimen to see if it is safe and effective for you.

## 2023-10-31 NOTE — PROGRESS NOTES
[facility] PRIMARY CARE    NAME: Jaye Covert  AGE: 37 y.o. SEX: male  : 1980     DATE: 10/31/2023     Assessment and Plan:     Problem List Items Addressed This Visit    None  Visit Diagnoses       Encounter for immunization    -  Primary    Relevant Orders    influenza vaccine, quadrivalent, 0.5 mL, preservative-free, for adult and pediatric patients 6 mos+ (AFLURIA, FLUARIX, FLULAVAL, FLUZONE)    Annual physical exam            Flu shot today   Stay hydrated  Followup with bariatrics - he is doing better in terms of diet choices and will increase activity  Rto 6months/prn     Immunizations and preventive care screenings were discussed with patient today. Appropriate education was printed on patient's after visit summary. Counseling:  Exercise: the importance of regular exercise/physical activity was discussed. Recommend exercise 3-5 times per week for at least 30 minutes. Depression Screening and Follow-up Plan: Patient was screened for depression during today's encounter. They screened negative with a PHQ-2 score of 0. No follow-ups on file. Chief Complaint:     Chief Complaint   Patient presents with   • Physical Exam      History of Present Illness:     Adult Annual Physical   Patient here for a comprehensive physical exam. The patient reports problems - Annual PE doing ok overall No cardiac issues He is doing better with diet changes and following healthier choices  . Diet and Physical Activity  Diet/Nutrition: well balanced diet. Exercise: walking. Depression Screening  PHQ-2/9 Depression Screening    Little interest or pleasure in doing things: 0 - not at all  Feeling down, depressed, or hopeless: 0 - not at all  PHQ-2 Score: 0  PHQ-2 Interpretation: Negative depression screen       General Health  Sleep: gets 7-8 hours of sleep on average. Hearing: normal - bilateral.  Vision: no vision problems. Dental: regular dental visits.  Health  Symptoms include: none    Advanced Care Planning  Do you have an advanced directive? no  Do you have a durable medical power of ? no     Review of Systems:     Review of Systems   Constitutional:  Negative for chills and fever. HENT: Negative. Eyes:  Negative for visual disturbance. Respiratory:  Negative for cough and shortness of breath. Cardiovascular:  Negative for chest pain, palpitations and leg swelling. Gastrointestinal: Negative. Musculoskeletal:  Negative for gait problem and myalgias. Neurological:  Negative for dizziness, light-headedness and headaches. Psychiatric/Behavioral:  Negative for sleep disturbance. The patient is not nervous/anxious.        Past Medical History:     Past Medical History:   Diagnosis Date   • Aortic valve cusp abnormality     "bicuspid aortic valve"   • Atrial fibrillation (HCC)    • Hypertension       Past Surgical History:     Past Surgical History:   Procedure Laterality Date   • ANTERIOR CRUCIATE LIGAMENT REPAIR Right    • KNEE ARTHROSCOPY Right    • OTHER SURGICAL HISTORY      SAE   • [place] SURGICAL ARTHROSCOPY SHOULDER REPAIR SLAP LESION Left 5/26/2016    Procedure: SHOULDER ARTHROSCOPY SLAP REPAIR;  Surgeon: Carlin Mitchell MD;  Location: [facility];  Service: Orthopedics   • [place] SURGICAL ARTHROSCOPY SHOULDER REPAIR SLAP LESION Right 11/26/2018    Procedure: Shoulder Arthroscopy with labral repair and biceps tenotomy;  Surgeon: Carlin Mitchell MD;  Location: [facility];  Service: Orthopedics   • ROTATOR CUFF REPAIR Left    • VASECTOMY        Family History:     Family History   Problem Relation Age of Onset   • Hypertension Father    • Prostate cancer Maternal Grandfather    • Other Paternal Grandmother         Carotid Stenosis    • Hyperlipidemia Paternal Grandmother    • Hypertension Paternal Grandmother    • Stroke Paternal Grandmother    • Coronary artery disease Paternal Grandfather    • Hyperlipidemia Paternal Grandfather    • Hypertension Paternal Grandfather    • Diabetes type II Paternal Grandfather    • Breast cancer Mother    • Thyroid disease Neg Hx       Social History:     Social History     Socioeconomic History   • Marital status:      Spouse name: None   • Number of children: None   • Years of education: None   • Highest education level: None   Occupational History   • None   Tobacco Use   • Smoking status: Never   • Smokeless tobacco: Current     Types: Chew   • Tobacco comments:     Tried Welbutrin (sp?), didnt work   Vaping Use   • Vaping Use: Never used   Substance and Sexual Activity   • Alcohol use: Yes     Comment: Not nearly enough   • Drug use: No   • Sexual activity: Yes     Partners: Female     Birth control/protection: Male Sterilization   Other Topics Concern   • None   Social History Narrative    Always uses seat belt     Caffeine Use     Dental care regularly           Social Determinants of Health     Financial Resource Strain: Not on file   Food Insecurity: Not on file   Transportation Needs: Not on file   Physical Activity: Not on file   Stress: Not on file   Social Connections: Not on file   Intimate Partner Violence: Not on file   Housing Stability: Not on file      Current Medications:     Current Outpatient Medications   Medication Sig Dispense Refill   • Klor-Con M20 20 MEQ tablet TAKE 1 TABLET DAILY 90 tablet 3   • lisinopril-hydrochlorothiazide (PRINZIDE,ZESTORETIC) 20-12.5 MG per tablet TAKE 2 TABLETS DAILY 180 tablet 3   • Multiple Vitamins-Minerals (MULTIVITAMIN ADULT EXTRA C PO) Take by mouth     • Semaglutide-Weight Management (WEGOVY) 2.4 MG/0.75ML Inject 0.75 mL (2.4 mg total) under the skin once a week 9 mL 1     No current facility-administered medications for this visit. Allergies:      Allergies   Allergen Reactions   • Penicillins Hives      Physical Exam:     /72   Pulse 63   Temp 97.5 °F (36.4 °C) (Temporal)   Resp 18 Ht 6' 1" (1.854 m)   Wt (!) 139 kg (307 lb)   SpO2 96%   BMI 40.50 kg/m²     Physical Exam  Vitals and nursing note reviewed. Constitutional:       General: He is not in acute distress. Appearance: Normal appearance. He is not ill-appearing, toxic-appearing or diaphoretic. HENT:      Head: Normocephalic and atraumatic. Right Ear: External ear normal.      Left Ear: External ear normal.      Nose: Nose normal.      Mouth/Throat:      Mouth: Mucous membranes are moist.   Eyes:      General: No scleral icterus. Extraocular Movements: Extraocular movements intact. Conjunctiva/sclera: Conjunctivae normal.      Pupils: Pupils are equal, round, and reactive to light. Cardiovascular:      Rate and Rhythm: Normal rate and regular rhythm. Pulses: Normal pulses. Heart sounds: Normal heart sounds. No murmur heard. Pulmonary:      Effort: Pulmonary effort is normal. No respiratory distress. Breath sounds: Normal breath sounds. No wheezing. Abdominal:      General: Bowel sounds are normal. There is no distension. Palpations: Abdomen is soft. Tenderness: There is no abdominal tenderness. Musculoskeletal:      Cervical back: Normal range of motion and neck supple. Right lower leg: No edema. Left lower leg: No edema. Lymphadenopathy:      Cervical: No cervical adenopathy. Skin:     General: Skin is warm. Coloration: Skin is not jaundiced or pale. Neurological:      General: No focal deficit present. Mental Status: He is alert. Mental status is at baseline. Cranial Nerves: No cranial nerve deficit. Sensory: No sensory deficit. Psychiatric:         Mood and Affect: Mood normal.         Thought Content:  Thought content normal.         Judgment: Judgment normal.        DO Faizan Sevilla

## 2023-11-11 ENCOUNTER — APPOINTMENT (OUTPATIENT)
Dept: LAB | Facility: MEDICAL CENTER | Age: 43
End: 2023-11-11
Payer: COMMERCIAL

## 2023-11-11 DIAGNOSIS — E66.01 MORBID OBESITY WITH BMI OF 40.0-44.9, ADULT (HCC): ICD-10-CM

## 2023-11-11 DIAGNOSIS — I10 PRIMARY HYPERTENSION: ICD-10-CM

## 2023-11-11 LAB
ALBUMIN SERPL BCP-MCNC: 4.2 G/DL (ref 3.5–5)
ALP SERPL-CCNC: 45 U/L (ref 34–104)
ALT SERPL W P-5'-P-CCNC: 12 U/L (ref 7–52)
ANION GAP SERPL CALCULATED.3IONS-SCNC: 9 MMOL/L
AST SERPL W P-5'-P-CCNC: 14 U/L (ref 13–39)
BILIRUB SERPL-MCNC: 0.62 MG/DL (ref 0.2–1)
BUN SERPL-MCNC: 17 MG/DL (ref 5–25)
CALCIUM SERPL-MCNC: 9.5 MG/DL (ref 8.4–10.2)
CHLORIDE SERPL-SCNC: 100 MMOL/L (ref 96–108)
CHOLEST SERPL-MCNC: 161 MG/DL
CO2 SERPL-SCNC: 27 MMOL/L (ref 21–32)
CREAT SERPL-MCNC: 1.06 MG/DL (ref 0.6–1.3)
GFR SERPL CREATININE-BSD FRML MDRD: 85 ML/MIN/1.73SQ M
GLUCOSE P FAST SERPL-MCNC: 90 MG/DL (ref 65–99)
HDLC SERPL-MCNC: 34 MG/DL
LDLC SERPL CALC-MCNC: 101 MG/DL (ref 0–100)
NONHDLC SERPL-MCNC: 127 MG/DL
POTASSIUM SERPL-SCNC: 4.1 MMOL/L (ref 3.5–5.3)
PROT SERPL-MCNC: 6.6 G/DL (ref 6.4–8.4)
SODIUM SERPL-SCNC: 136 MMOL/L (ref 135–147)
TRIGL SERPL-MCNC: 130 MG/DL

## 2023-11-11 PROCEDURE — 80053 COMPREHEN METABOLIC PANEL: CPT

## 2023-11-11 PROCEDURE — 80061 LIPID PANEL: CPT

## 2023-11-11 PROCEDURE — 36415 COLL VENOUS BLD VENIPUNCTURE: CPT

## 2023-11-24 ENCOUNTER — TELEPHONE (OUTPATIENT)
Dept: BARIATRICS | Facility: CLINIC | Age: 43
End: 2023-11-24

## 2023-11-24 NOTE — TELEPHONE ENCOUNTER
Pharmacy and patient was informed regardinf the approval for wegovy from 10/23/2023-11/21/2024. He was also informed of the 4-5% wt loss requirements for renewal purposes.

## 2024-01-09 ENCOUNTER — VBI (OUTPATIENT)
Dept: ADMINISTRATIVE | Facility: OTHER | Age: 44
End: 2024-01-09

## 2024-03-05 ENCOUNTER — OFFICE VISIT (OUTPATIENT)
Dept: BARIATRICS | Facility: CLINIC | Age: 44
End: 2024-03-05
Payer: COMMERCIAL

## 2024-03-05 VITALS
DIASTOLIC BLOOD PRESSURE: 89 MMHG | HEART RATE: 78 BPM | WEIGHT: 293.8 LBS | BODY MASS INDEX: 41.13 KG/M2 | TEMPERATURE: 99.1 F | HEIGHT: 71 IN | RESPIRATION RATE: 16 BRPM | SYSTOLIC BLOOD PRESSURE: 113 MMHG

## 2024-03-05 DIAGNOSIS — I48.0 PAF (PAROXYSMAL ATRIAL FIBRILLATION) (HCC): ICD-10-CM

## 2024-03-05 DIAGNOSIS — G47.33 OSA (OBSTRUCTIVE SLEEP APNEA): ICD-10-CM

## 2024-03-05 DIAGNOSIS — E66.9 OBESITY, CLASS II, BMI 35-39.9: Primary | ICD-10-CM

## 2024-03-05 PROCEDURE — 99214 OFFICE O/P EST MOD 30 MIN: CPT | Performed by: PHYSICIAN ASSISTANT

## 2024-03-05 RX ORDER — SEMAGLUTIDE 2.4 MG/.75ML
2.4 INJECTION, SOLUTION SUBCUTANEOUS WEEKLY
COMMUNITY
End: 2024-03-05

## 2024-03-05 RX ORDER — TIRZEPATIDE 2.5 MG/.5ML
2.5 INJECTION, SOLUTION SUBCUTANEOUS WEEKLY
Qty: 2 ML | Refills: 0 | Status: SHIPPED | OUTPATIENT
Start: 2024-03-05 | End: 2024-04-02

## 2024-03-05 NOTE — PROGRESS NOTES
Assessment/Plan:    Obesity, Class II, BMI 35-39.9  -Patient is pursuing conservative program  - Not interested in weight loss surgery.  -Initial weight loss goal of 5-10% weight loss for improved health      - Although he has lost more than 10% weight on wegovy.  To switch to zepbound to increase weight loss progress and hopefully continue to improve blood pressure control.    Other medication options:Avoid phentermine due to hypertension, has failed wellbutrin in the past. He was on Saxenda. Start weight 334 on 4/26/22. Continued with injection site reaction so was switched to Wegovy on 8/15/22 (312)    Initial:334  Last visit: 299.6  Current:293.8  Change:-34.4 lbs (-9.8lb lbs since the last office visit)  Goal: 250     Goals:  -continue with water intake and to try to integrate vegetables in.    -recommend adding formal exercise on, specifically strength training    Hypertension  On prinzide and has PCP followup 10/31  -should improve with weight loss, dietary, and lifestyle changes      TARAH (obstructive sleep apnea)  Continue CPAP. May improve with weight loss        Follow up in approximately  4 months   with Non-Surgical Physician/Advanced Practitioner.     Diagnoses and all orders for this visit:    Obesity, Class II, BMI 35-39.9  -     tirzepatide (Zepbound) 2.5 mg/0.5 mL auto-injector; Inject 0.5 mL (2.5 mg total) under the skin once a week for 28 days    PAF (paroxysmal atrial fibrillation) (HCC)    TARAH (obstructive sleep apnea)    Other orders  -     Discontinue: Semaglutide-Weight Management (Wegovy) 2.4 MG/0.75ML; Inject 2.4 mg under the skin once a week          Subjective:   Chief Complaint   Patient presents with    Follow-up     MWM-5m F/u        Patient ID: Juan Diego Ivory  is a 43 y.o. male with excess weight/obesity here to pursue weight managment.  Patient is pursuing Conservative Program.     HPI on wegovy 2.4mg and denies any side effects.  He is trying to stay active when he can.  Doing  well with protein intake. He does not like vegetables but takes MVI to make sure not deficient.    Wt Readings from Last 10 Encounters:   03/05/24 133 kg (293 lb 12.8 oz)   10/31/23 (!) 139 kg (307 lb)   10/04/23 136 kg (299 lb 9.6 oz)   06/08/23 (!) 141 kg (311 lb)   06/08/23 (!) 141 kg (311 lb)   06/07/23 (!) 141 kg (311 lb 12.8 oz)   04/27/23 (!) 140 kg (309 lb)   03/07/23 (!) 140 kg (309 lb 6.4 oz)   11/07/22 (!) 141 kg (311 lb)   10/28/22 (!) 144 kg (317 lb 6.4 oz)       Food logging:no  Increased appetite/cravings:controlled  Exercise:trying to get regular exericse  Hydration:at least 64 oz of water      The following portions of the patient's history were reviewed and updated as appropriate: He  has a past medical history of Aortic valve cusp abnormality, Atrial fibrillation (HCC), and Hypertension.  He   Patient Active Problem List    Diagnosis Date Noted    TARAH (obstructive sleep apnea)     Aortic aneurysm of unspecified site, without rupture ([facility]) 07/28/2021    Morbid obesity ([facility]) 02/04/2021    Visit for preventive health examination 04/24/2020    PAF (paroxysmal atrial fibrillation) ([facility]) 05/20/2019    Obesity, Class II, BMI 35-39.9 05/13/2019    Labral tear of shoulder, right, subsequent encounter 08/27/2018    Bicuspid aortic valve 11/22/2017    Hypertension 11/22/2017    SLAP tear of shoulder 05/26/2016    Hyperlipidemia 01/02/2015     He  has a past surgical history that includes Anterior cruciate ligament repair (Right); Vasectomy; Other surgical history; pr surgical arthroscopy shoulder repair slap lesion (Left, 5/26/2016); Knee arthroscopy (Right); Rotator cuff repair (Left); and pr surgical arthroscopy shoulder repair slap lesion (Right, 11/26/2018).  His family history includes Breast cancer in his mother; Coronary artery disease in his paternal grandfather; Diabetes type II in his paternal grandfather; Hyperlipidemia in his paternal grandfather and paternal grandmother; Hypertension in his  "father, paternal grandfather, and paternal grandmother; Other in his paternal grandmother; Prostate cancer in his maternal grandfather; Stroke in his paternal grandmother.  He  reports that he has never smoked. His smokeless tobacco use includes chew. He reports current alcohol use. He reports that he does not use drugs.  Current Outpatient Medications   Medication Sig Dispense Refill    Klor-Con M20 20 MEQ tablet TAKE 1 TABLET DAILY 90 tablet 3    lisinopril-hydrochlorothiazide (PRINZIDE,ZESTORETIC) 20-12.5 MG per tablet TAKE 2 TABLETS DAILY 180 tablet 3    Multiple Vitamins-Minerals (MULTIVITAMIN ADULT EXTRA C PO) Take by mouth      tirzepatide (Zepbound) 2.5 mg/0.5 mL auto-injector Inject 0.5 mL (2.5 mg total) under the skin once a week for 28 days 2 mL 0     No current facility-administered medications for this visit.     Current Outpatient Medications on File Prior to Visit   Medication Sig    Klor-Con M20 20 MEQ tablet TAKE 1 TABLET DAILY    lisinopril-hydrochlorothiazide (PRINZIDE,ZESTORETIC) 20-12.5 MG per tablet TAKE 2 TABLETS DAILY    Multiple Vitamins-Minerals (MULTIVITAMIN ADULT EXTRA C PO) Take by mouth     No current facility-administered medications on file prior to visit.     He is allergic to penicillins..    Review of Systems   Constitutional:  Negative for fatigue.   Respiratory:  Negative for shortness of breath.    Cardiovascular:  Negative for chest pain and palpitations.   Gastrointestinal:  Negative for abdominal pain, constipation and diarrhea.   Endocrine: Negative for cold intolerance and heat intolerance.   Genitourinary:  Negative for difficulty urinating.   Musculoskeletal:  Negative for arthralgias and back pain.   Skin:  Negative for rash.   Neurological:  Negative for headaches.   Psychiatric/Behavioral:  Negative for dysphoric mood. The patient is not nervous/anxious.        Objective:    /89   Pulse 78   Temp 99.1 °F (37.3 °C)   Resp 16   Ht 5' 11\" (1.803 m)   Wt 133 kg " (293 lb 12.8 oz)   BMI 40.98 kg/m²      Physical Exam  Vitals and nursing note reviewed.   Constitutional:       General: He is not in acute distress.     Appearance: He is well-developed. He is obese.   HENT:      Head: Normocephalic and atraumatic.   Eyes:      Conjunctiva/sclera: Conjunctivae normal.   Neck:      Thyroid: No thyromegaly.   Pulmonary:      Effort: Pulmonary effort is normal. No respiratory distress.   Skin:     Findings: No rash (visible).   Neurological:      Mental Status: He is alert and oriented to person, place, and time.   Psychiatric:         Mood and Affect: Mood normal.         Behavior: Behavior normal.

## 2024-03-05 NOTE — ASSESSMENT & PLAN NOTE
-Patient is pursuing conservative program  - Not interested in weight loss surgery.  -Initial weight loss goal of 5-10% weight loss for improved health      - Although he has lost more than 10% weight on wegovy.  To switch to zepbound to increase weight loss progress and hopefully continue to improve blood pressure control.    Other medication options:Avoid phentermine due to hypertension, has failed wellbutrin in the past. He was on Saxenda. Start weight 334 on 4/26/22. Continued with injection site reaction so was switched to Wegovy on 8/15/22 (312)    Initial:334  Last visit: 299.6  Current:293.8  Change:-34.4 lbs (-9.8lb lbs since the last office visit)  Goal: 250     Goals:  -continue with water intake and to try to integrate vegetables in.    -recommend adding formal exercise on, specifically strength training

## 2024-03-06 NOTE — ASSESSMENT & PLAN NOTE
On prinzide and has PCP followup 10/31  -should improve with weight loss, dietary, and lifestyle changes

## 2024-03-08 ENCOUNTER — TELEPHONE (OUTPATIENT)
Age: 44
End: 2024-03-08

## 2024-03-08 NOTE — TELEPHONE ENCOUNTER
PA for Zepbound    Submitted via    []CMM-KEY    [x][employer]-Case ID #  [id number]  []Faxed to plan   []Other website    []Phone call Case ID #      Office notes sent, clinical questions answered. Awaiting determination    Turnaround time for your insurance to make a decision on your Prior Authorization can take 7-21 business days.

## 2024-03-08 NOTE — TELEPHONE ENCOUNTER
PA for Zepbound Approved   Date(s) approved February 7, 2024 to November 3, 2024   Case #     Patient advised by [x] [employer] Message                      [x] Phone call       Pharmacy advised by [x]Fax                                     []Phone call    Approval letter scanned into Media Yes

## 2024-04-02 DIAGNOSIS — E66.9 OBESITY, CLASS II, BMI 35-39.9: ICD-10-CM

## 2024-04-02 RX ORDER — TIRZEPATIDE 5 MG/.5ML
5 INJECTION, SOLUTION SUBCUTANEOUS WEEKLY
Qty: 4 ML | Refills: 0 | Status: SHIPPED | OUTPATIENT
Start: 2024-04-02 | End: 2024-05-28

## 2024-04-02 RX ORDER — TIRZEPATIDE 2.5 MG/.5ML
2.5 INJECTION, SOLUTION SUBCUTANEOUS WEEKLY
Qty: 2 ML | Refills: 0 | Status: SHIPPED | OUTPATIENT
Start: 2024-04-02 | End: 2024-04-30

## 2024-04-24 ENCOUNTER — RA CDI HCC (OUTPATIENT)
Dept: OTHER | Facility: HOSPITAL | Age: 44
End: 2024-04-24

## 2024-04-24 NOTE — PROGRESS NOTES
HCC coding opportunities          Chart Reviewed number of suggestions sent to Provider: 1   E66.01      Patients Insurance        Commercial Insurance: [employer]

## 2024-04-30 ENCOUNTER — OFFICE VISIT (OUTPATIENT)
Dept: FAMILY MEDICINE CLINIC | Facility: CLINIC | Age: 44
End: 2024-04-30
Payer: COMMERCIAL

## 2024-04-30 VITALS
TEMPERATURE: 98 F | HEART RATE: 76 BPM | BODY MASS INDEX: 41.83 KG/M2 | DIASTOLIC BLOOD PRESSURE: 78 MMHG | WEIGHT: 298.8 LBS | SYSTOLIC BLOOD PRESSURE: 124 MMHG | RESPIRATION RATE: 18 BRPM | OXYGEN SATURATION: 97 % | HEIGHT: 71 IN

## 2024-04-30 DIAGNOSIS — G47.33 OSA (OBSTRUCTIVE SLEEP APNEA): ICD-10-CM

## 2024-04-30 DIAGNOSIS — Q23.1 BICUSPID AORTIC VALVE: ICD-10-CM

## 2024-04-30 DIAGNOSIS — I48.0 PAF (PAROXYSMAL ATRIAL FIBRILLATION) (HCC): Primary | ICD-10-CM

## 2024-04-30 DIAGNOSIS — I10 HYPERTENSION, UNSPECIFIED TYPE: ICD-10-CM

## 2024-04-30 PROCEDURE — 99214 OFFICE O/P EST MOD 30 MIN: CPT | Performed by: INTERNAL MEDICINE

## 2024-04-30 PROCEDURE — 3725F SCREEN DEPRESSION PERFORMED: CPT | Performed by: INTERNAL MEDICINE

## 2024-04-30 NOTE — PROGRESS NOTES
Name: Juan Diego Ivory      : 1980      MRN: [id number]  Encounter Provider: Letitia Valdes DO  Encounter Date: 2024   Encounter department: [facility]    Assessment & Plan     1. PAF (paroxysmal atrial fibrillation) (HCC)  Sxs managed on current rx He has echo/cardio appt in  but needs to change date Pt will call to r/s  Stay hydrated especially when out in the warm temps  2. Bicuspid aortic valve  Stable and he is scheduled for upcoming echo this summer     3. Hypertension, unspecified type  BP stable  He is awaiting zepbound rx as it is not available currently but hoping it will further weight loss goals     4. TARAH (obstructive sleep apnea)  He feels sleep is stable and feels rested during the day         Depression Screening and Follow-up Plan: Patient was screened for depression during today's encounter. They screened negative with a PHQ-2 score of 0.      Rto 6 months   Subjective      HPI  Pt generally well He is following with weight mgmt but he has been unable to get the new rx zepbound from the mail order - it is not available currently So he is off medication now He is active cutting grass and other home activities Diet fair control He had plateued on wegovy so plan to trial zepbound No chest pain or sob No acute issues He feels he gest restful sleep He drinks mainly water No dizziness or palpitations  Review of Systems   Constitutional:  Negative for chills and fever.   HENT: Negative.     Eyes:  Negative for visual disturbance.   Respiratory:  Negative for cough and shortness of breath.    Cardiovascular:  Negative for chest pain, palpitations and leg swelling.   Gastrointestinal:  Negative for abdominal distention and abdominal pain.   Genitourinary: Negative.    Musculoskeletal: Negative.    Neurological:  Negative for dizziness, light-headedness and headaches.   Psychiatric/Behavioral:  Negative for sleep disturbance. The patient is not nervous/anxious.   "      Current Outpatient Medications on File Prior to Visit   Medication Sig    Klor-Con M20 20 MEQ tablet TAKE 1 TABLET DAILY    lisinopril-hydrochlorothiazide (PRINZIDE,ZESTORETIC) 20-12.5 MG per tablet TAKE 2 TABLETS DAILY    Multiple Vitamins-Minerals (MULTIVITAMIN ADULT EXTRA C PO) Take by mouth    tirzepatide (Zepbound) 2.5 mg/0.5 mL auto-injector Inject 0.5 mL (2.5 mg total) under the skin once a week for 28 days (Patient not taking: Reported on 4/30/2024)    tirzepatide (Zepbound) 5 mg/0.5 mL auto-injector Inject 0.5 mL (5 mg total) under the skin once a week After 1 month of weekly 2.5mg (Patient not taking: Reported on 4/30/2024)       Objective     /78   Pulse 76   Temp 98 °F (36.7 °C) (Temporal)   Resp 18   Ht 5' 11\" (1.803 m)   Wt 136 kg (298 lb 12.8 oz)   SpO2 97%   BMI 41.67 kg/m²     Physical Exam  Vitals and nursing note reviewed.   Constitutional:       General: He is not in acute distress.     Appearance: Normal appearance. He is not ill-appearing, toxic-appearing or diaphoretic.   HENT:      Head: Normocephalic and atraumatic.      Right Ear: External ear normal.      Left Ear: External ear normal.      Nose: Nose normal.      Mouth/Throat:      Mouth: Mucous membranes are moist.   Cardiovascular:      Rate and Rhythm: Normal rate and regular rhythm.      Pulses: Normal pulses.      Heart sounds: Normal heart sounds. No murmur heard.  Pulmonary:      Effort: Pulmonary effort is normal. No respiratory distress.      Breath sounds: Normal breath sounds. No wheezing.   Abdominal:      General: Bowel sounds are normal. There is no distension.      Palpations: Abdomen is soft.      Tenderness: There is no abdominal tenderness.   Musculoskeletal:      Cervical back: Normal range of motion and neck supple.      Right lower leg: No edema.      Left lower leg: No edema.   Lymphadenopathy:      Cervical: No cervical adenopathy.   Skin:     General: Skin is warm and dry.      Coloration: Skin " is not jaundiced or pale.   Neurological:      General: No focal deficit present.      Mental Status: He is alert and oriented to person, place, and time. Mental status is at baseline.      Cranial Nerves: No cranial nerve deficit.      Sensory: No sensory deficit.       Letitia Valdes DO

## 2024-11-01 ENCOUNTER — RA CDI HCC (OUTPATIENT)
Dept: OTHER | Facility: HOSPITAL | Age: 44
End: 2024-11-01

## 2024-11-05 ENCOUNTER — OFFICE VISIT (OUTPATIENT)
Dept: FAMILY MEDICINE CLINIC | Facility: CLINIC | Age: 44
End: 2024-11-05
Payer: COMMERCIAL

## 2024-11-05 VITALS
OXYGEN SATURATION: 97 % | WEIGHT: 314.4 LBS | HEIGHT: 71 IN | RESPIRATION RATE: 18 BRPM | HEART RATE: 78 BPM | SYSTOLIC BLOOD PRESSURE: 124 MMHG | BODY MASS INDEX: 44.02 KG/M2 | TEMPERATURE: 97.6 F | DIASTOLIC BLOOD PRESSURE: 78 MMHG

## 2024-11-05 DIAGNOSIS — Z12.5 PROSTATE CANCER SCREENING: ICD-10-CM

## 2024-11-05 DIAGNOSIS — I10 HYPERTENSION, UNSPECIFIED TYPE: ICD-10-CM

## 2024-11-05 DIAGNOSIS — I48.0 PAF (PAROXYSMAL ATRIAL FIBRILLATION) (HCC): ICD-10-CM

## 2024-11-05 DIAGNOSIS — Q23.81 BICUSPID AORTIC VALVE: ICD-10-CM

## 2024-11-05 DIAGNOSIS — R60.0 LOCALIZED EDEMA: Primary | ICD-10-CM

## 2024-11-05 DIAGNOSIS — R35.0 URINARY FREQUENCY: ICD-10-CM

## 2024-11-05 PROBLEM — E66.812 OBESITY, CLASS II, BMI 35-39.9: Status: RESOLVED | Noted: 2019-05-13 | Resolved: 2024-11-05

## 2024-11-05 PROCEDURE — 99396 PREV VISIT EST AGE 40-64: CPT | Performed by: INTERNAL MEDICINE

## 2024-11-05 NOTE — PROGRESS NOTES
Adult Annual Physical  Name: Juan Diego Ivory      : 1980      MRN: [id number]  Encounter Provider: Letitia Valdes DO  Encounter Date: 2024   Encounter department: [facility]    Assessment & Plan  Urinary frequency    Orders:    PSA, Total Screen; Future    UA (URINE) with reflex to Scope; Future  Check labs/urine   Limit fluids after 6pm  Localized edema    Orders:    B-Type Natriuretic Peptide(BNP); Future  Lo sodium diet and schedule echo that had been ordered  He also needs to r/s cardio appt   Prostate cancer screening    Orders:    PSA, Total Screen; Future    PAF (paroxysmal atrial fibrillation) (HCC)  Stable on current rx He will call to r/s cardio followup       Hypertension, unspecified type  BP stable He is getting back on track with exercise and diet and plans to r/s with wt mgmt       Bicuspid aortic valve  Due for echo and he will schedule       Immunizations and preventive care screenings were discussed with patient today. Appropriate education was printed on patient's after visit summary.        Counseling:  Exercise: the importance of regular exercise/physical activity was discussed. Recommend exercise 3-5 times per week for at least 30 minutes.       Depression Screening and Follow-up Plan: Patient was screened for depression during today's encounter. They screened negative with a PHQ-2 score of 0.    Tobacco Cessation Counseling: Tobacco cessation counseling was provided. The patient is sincerely urged to quit consumption of tobacco. He is not ready to quit tobacco.         History of Present Illness   Pt doing ok He has not been as faithful with diet and exercise and needs to r/s echo and cardio followup No chest pain or sob Had uri in September but feels fine now Busy with his son who is Senior in  and looking at colleges He wants to get back with wt mgmt as well as he has had some ankle swelling and feels diet a factor He gets up at night to go to the bathroom  "but does drink more water     Adult Annual Physical  Review of Systems   Constitutional:  Negative for chills and fever.   HENT: Negative.     Eyes:  Negative for visual disturbance.   Respiratory:  Negative for cough and shortness of breath.    Cardiovascular: Negative.    Gastrointestinal: Negative.    Genitourinary: Negative.    Musculoskeletal: Negative.    Neurological: Negative.    Psychiatric/Behavioral:  Positive for sleep disturbance.      Past Medical History:   Diagnosis Date    Aortic valve cusp abnormality     \"bicuspid aortic valve\"    Atrial fibrillation (HCC)     Hypertension      Past Surgical History:   Procedure Laterality Date    ANTERIOR CRUCIATE LIGAMENT REPAIR Right     KNEE ARTHROSCOPY Right     OTHER SURGICAL HISTORY      SAE    HI SURGICAL ARTHROSCOPY SHOULDER REPAIR SLAP LESION Left 5/26/2016    Procedure: SHOULDER ARTHROSCOPY SLAP REPAIR;  Surgeon: Mg Burns MD;  Location: [facility];  Service: Orthopedics    HI SURGICAL ARTHROSCOPY SHOULDER REPAIR SLAP LESION Right 11/26/2018    Procedure: Shoulder Arthroscopy with labral repair and biceps tenotomy;  Surgeon: Mg Burns MD;  Location: [facility];  Service: Orthopedics    ROTATOR CUFF REPAIR Left     VASECTOMY       Social History     Socioeconomic History    Marital status:      Spouse name: Not on file    Number of children: Not on file    Years of education: Not on file    Highest education level: Not on file   Occupational History    Not on file   Tobacco Use    Smoking status: Never    Smokeless tobacco: Current     Types: Snuff    Tobacco comments:     Tried Welbutrin (sp?), didnt work   Vaping Use    Vaping status: Never Used   Substance and Sexual Activity    Alcohol use: Yes     Comment: Not nearly enough    Drug use: No    Sexual activity: Yes     Partners: Female     Birth control/protection: Male Sterilization   Other Topics Concern    Not on file   Social History Narrative    Always uses seat belt     Caffeine " "Use     Dental care regularly           Social Determinants of Health     Financial Resource Strain: Not on file   Food Insecurity: Not on file   Transportation Needs: Not on file   Physical Activity: Not on file   Stress: Not on file   Social Connections: Not on file   Intimate Partner Violence: Not on file   Housing Stability: Not on file     Allergies   Allergen Reactions    Penicillins Hives         Objective     /78   Pulse 78   Temp 97.6 °F (36.4 °C) (Temporal)   Resp 18   Ht 5' 11\" (1.803 m)   Wt (!) 143 kg (314 lb 6.4 oz)   SpO2 97%   BMI 43.85 kg/m²     Physical Exam  Vitals and nursing note reviewed.   Constitutional:       General: He is not in acute distress.     Appearance: Normal appearance. He is not ill-appearing, toxic-appearing or diaphoretic.   HENT:      Head: Normocephalic and atraumatic.      Right Ear: External ear normal.      Left Ear: External ear normal.      Nose: Nose normal.      Mouth/Throat:      Mouth: Mucous membranes are moist.   Eyes:      General: No scleral icterus.     Extraocular Movements: Extraocular movements intact.      Conjunctiva/sclera: Conjunctivae normal.      Pupils: Pupils are equal, round, and reactive to light.   Cardiovascular:      Rate and Rhythm: Normal rate and regular rhythm.      Pulses: Normal pulses.      Heart sounds: Normal heart sounds.   Pulmonary:      Effort: Pulmonary effort is normal. No respiratory distress.      Breath sounds: Normal breath sounds. No wheezing.   Abdominal:      General: Bowel sounds are normal. There is no distension.      Palpations: Abdomen is soft.      Tenderness: There is no abdominal tenderness.   Musculoskeletal:         General: Normal range of motion.      Cervical back: Normal range of motion and neck supple.   Lymphadenopathy:      Cervical: No cervical adenopathy.   Skin:     General: Skin is warm and dry.      Coloration: Skin is not jaundiced or pale.   Neurological:      General: No focal " deficit present.      Mental Status: He is alert and oriented to person, place, and time. Mental status is at baseline.      Cranial Nerves: No cranial nerve deficit.   Psychiatric:         Mood and Affect: Mood normal.         Behavior: Behavior normal.         Thought Content: Thought content normal.         Judgment: Judgment normal.

## 2025-01-01 DIAGNOSIS — I10 PRIMARY HYPERTENSION: ICD-10-CM

## 2025-01-01 DIAGNOSIS — E87.6 HYPOKALEMIA: ICD-10-CM

## 2025-01-02 RX ORDER — LISINOPRIL AND HYDROCHLOROTHIAZIDE 12.5; 2 MG/1; MG/1
2 TABLET ORAL DAILY
Qty: 60 TABLET | Refills: 0 | Status: SHIPPED | OUTPATIENT
Start: 2025-01-02

## 2025-01-02 RX ORDER — POTASSIUM CHLORIDE 1500 MG/1
20 TABLET, EXTENDED RELEASE ORAL DAILY
Qty: 30 TABLET | Refills: 0 | Status: SHIPPED | OUTPATIENT
Start: 2025-01-02

## 2025-02-08 ENCOUNTER — APPOINTMENT (OUTPATIENT)
Dept: LAB | Facility: MEDICAL CENTER | Age: 45
End: 2025-02-08
Payer: COMMERCIAL

## 2025-02-08 DIAGNOSIS — I48.0 PAF (PAROXYSMAL ATRIAL FIBRILLATION) (HCC): ICD-10-CM

## 2025-02-08 DIAGNOSIS — Z12.5 PROSTATE CANCER SCREENING: ICD-10-CM

## 2025-02-08 DIAGNOSIS — R35.0 URINARY FREQUENCY: ICD-10-CM

## 2025-02-08 DIAGNOSIS — Q23.81 BICUSPID AORTIC VALVE: ICD-10-CM

## 2025-02-08 DIAGNOSIS — R60.0 LOCALIZED EDEMA: ICD-10-CM

## 2025-02-08 DIAGNOSIS — I10 HYPERTENSION, UNSPECIFIED TYPE: ICD-10-CM

## 2025-02-08 LAB
ALBUMIN SERPL BCG-MCNC: 4.4 G/DL (ref 3.5–5)
ALP SERPL-CCNC: 49 U/L (ref 34–104)
ALT SERPL W P-5'-P-CCNC: 26 U/L (ref 7–52)
ANION GAP SERPL CALCULATED.3IONS-SCNC: 9 MMOL/L (ref 4–13)
AST SERPL W P-5'-P-CCNC: 19 U/L (ref 13–39)
BILIRUB SERPL-MCNC: 0.61 MG/DL (ref 0.2–1)
BILIRUB UR QL STRIP: NEGATIVE
BNP SERPL-MCNC: 10 PG/ML (ref 0–100)
BUN SERPL-MCNC: 17 MG/DL (ref 5–25)
CALCIUM SERPL-MCNC: 9.2 MG/DL (ref 8.4–10.2)
CHLORIDE SERPL-SCNC: 101 MMOL/L (ref 96–108)
CHOLEST SERPL-MCNC: 183 MG/DL (ref ?–200)
CLARITY UR: CLEAR
CO2 SERPL-SCNC: 27 MMOL/L (ref 21–32)
COLOR UR: NORMAL
CREAT SERPL-MCNC: 1.02 MG/DL (ref 0.6–1.3)
GFR SERPL CREATININE-BSD FRML MDRD: 88 ML/MIN/1.73SQ M
GLUCOSE P FAST SERPL-MCNC: 99 MG/DL (ref 65–99)
GLUCOSE UR STRIP-MCNC: NEGATIVE MG/DL
HDLC SERPL-MCNC: 40 MG/DL
HGB UR QL STRIP.AUTO: NEGATIVE
KETONES UR STRIP-MCNC: NEGATIVE MG/DL
LDLC SERPL CALC-MCNC: 109 MG/DL (ref 0–100)
LEUKOCYTE ESTERASE UR QL STRIP: NEGATIVE
NITRITE UR QL STRIP: NEGATIVE
NONHDLC SERPL-MCNC: 143 MG/DL
PH UR STRIP.AUTO: 6.5 [PH]
POTASSIUM SERPL-SCNC: 4.3 MMOL/L (ref 3.5–5.3)
PROT SERPL-MCNC: 6.9 G/DL (ref 6.4–8.4)
PROT UR STRIP-MCNC: NEGATIVE MG/DL
PSA SERPL-MCNC: 1.1 NG/ML (ref 0–4)
SODIUM SERPL-SCNC: 137 MMOL/L (ref 135–147)
SP GR UR STRIP.AUTO: 1.02 (ref 1–1.03)
TRIGL SERPL-MCNC: 171 MG/DL (ref ?–150)
UROBILINOGEN UR STRIP-ACNC: <2 MG/DL

## 2025-02-08 PROCEDURE — 80053 COMPREHEN METABOLIC PANEL: CPT

## 2025-02-08 PROCEDURE — 83880 ASSAY OF NATRIURETIC PEPTIDE: CPT

## 2025-02-08 PROCEDURE — 36415 COLL VENOUS BLD VENIPUNCTURE: CPT

## 2025-02-08 PROCEDURE — G0103 PSA SCREENING: HCPCS

## 2025-02-08 PROCEDURE — 80061 LIPID PANEL: CPT

## 2025-02-08 PROCEDURE — 81003 URINALYSIS AUTO W/O SCOPE: CPT

## 2025-02-09 ENCOUNTER — RESULTS FOLLOW-UP (OUTPATIENT)
Dept: FAMILY MEDICINE CLINIC | Facility: CLINIC | Age: 45
End: 2025-02-09

## 2025-02-27 DIAGNOSIS — I10 PRIMARY HYPERTENSION: ICD-10-CM

## 2025-02-27 DIAGNOSIS — E87.6 HYPOKALEMIA: ICD-10-CM

## 2025-02-28 RX ORDER — POTASSIUM CHLORIDE 1500 MG/1
20 TABLET, EXTENDED RELEASE ORAL DAILY
Qty: 30 TABLET | Refills: 5 | Status: SHIPPED | OUTPATIENT
Start: 2025-02-28

## 2025-02-28 RX ORDER — LISINOPRIL AND HYDROCHLOROTHIAZIDE 12.5; 2 MG/1; MG/1
2 TABLET ORAL DAILY
Qty: 60 TABLET | Refills: 5 | Status: SHIPPED | OUTPATIENT
Start: 2025-02-28

## 2025-03-04 ENCOUNTER — OFFICE VISIT (OUTPATIENT)
Dept: BARIATRICS | Facility: CLINIC | Age: 45
End: 2025-03-04
Payer: COMMERCIAL

## 2025-03-04 VITALS
RESPIRATION RATE: 16 BRPM | HEART RATE: 68 BPM | HEIGHT: 71 IN | TEMPERATURE: 98.1 F | SYSTOLIC BLOOD PRESSURE: 128 MMHG | WEIGHT: 315 LBS | DIASTOLIC BLOOD PRESSURE: 82 MMHG | BODY MASS INDEX: 44.1 KG/M2

## 2025-03-04 DIAGNOSIS — E66.01 MORBID OBESITY (HCC): Primary | ICD-10-CM

## 2025-03-04 DIAGNOSIS — E78.2 MIXED HYPERLIPIDEMIA: ICD-10-CM

## 2025-03-04 DIAGNOSIS — G47.33 OSA (OBSTRUCTIVE SLEEP APNEA): ICD-10-CM

## 2025-03-04 DIAGNOSIS — I10 PRIMARY HYPERTENSION: ICD-10-CM

## 2025-03-04 PROCEDURE — 99214 OFFICE O/P EST MOD 30 MIN: CPT | Performed by: PHYSICIAN ASSISTANT

## 2025-03-04 RX ORDER — TIRZEPATIDE 2.5 MG/.5ML
2.5 INJECTION, SOLUTION SUBCUTANEOUS WEEKLY
Qty: 2 ML | Refills: 0 | Status: SHIPPED | OUTPATIENT
Start: 2025-03-04 | End: 2025-04-01

## 2025-03-04 NOTE — ASSESSMENT & PLAN NOTE
-Patient is pursuing conservative program  -Initial weight loss goal of 5-10% weight loss for improved health  -Screening labs 2/8/25 CMP, lipids    Initial:334  Current:322.4  Change:-11.6  Goal:    To start on zepbound.  Due to supply shortage has not been alble to take it.   Was on saxenda prior  -Avoid phentermine due to hypertension. Has failed wellbutrin in the past.     To try to increase exercise. Recommend 3 x week.  Would have a protein in the AM and avoid meal skipping  To be cautious with dining out.

## 2025-03-04 NOTE — PROGRESS NOTES
Assessment/Plan:    Morbid obesity (HCC)  -Patient is pursuing conservative program  -Initial weight loss goal of 5-10% weight loss for improved health  -Screening labs 2/8/25 CMP, lipids    Initial:334  Current:322.4  Change:-11.6  Goal:    To start on zepbound.  Due to supply shortage has not been alble to take it.   Was on saxenda prior  -Avoid phentermine due to hypertension. Has failed wellbutrin in the past.     To try to increase exercise. Recommend 3 x week.  Would have a protein in the AM and avoid meal skipping  To be cautious with dining out.      TARAH (obstructive sleep apnea)  To start on zepbound  To continue CPAP    Hypertension  On lisionpril/hctz  -should improve with weight loss, dietary, and lifestyle changes      Hyperlipidemia  -should improve with weight loss, dietary, and lifestyle changes        Return in about 4 years (around 3/4/2029).medical provider followup       Diagnoses and all orders for this visit:    Morbid obesity (HCC)  -     tirzepatide (Zepbound) 2.5 mg/0.5 mL auto-injector; Inject 0.5 mL (2.5 mg total) under the skin once a week for 28 days    TARAH (obstructive sleep apnea)  -     tirzepatide (Zepbound) 2.5 mg/0.5 mL auto-injector; Inject 0.5 mL (2.5 mg total) under the skin once a week for 28 days    Primary hypertension  -     tirzepatide (Zepbound) 2.5 mg/0.5 mL auto-injector; Inject 0.5 mL (2.5 mg total) under the skin once a week for 28 days    Mixed hyperlipidemia          Subjective:   Chief Complaint   Patient presents with    Follow-up     Mwm 4mf/u: waist: 52in.        Patient ID: Juan Diego Ivory  is a 44 y.o. male with excess weight/obesity here to pursue weight managment.  Patient is pursuing Conservative Program.     HPI  He was on saxenda in the past and then was switched to zepbound last year but he hit supply shortage and was not able to take it. Would like to restart.  He is trying to stay active and wants to add more activity. Portion is the concern .    Wt  Readings from Last 10 Encounters:   03/04/25 (!) 146 kg (322 lb 6.4 oz)   11/05/24 (!) 143 kg (314 lb 6.4 oz)   04/30/24 136 kg (298 lb 12.8 oz)   03/05/24 133 kg (293 lb 12.8 oz)   10/31/23 (!) 139 kg (307 lb)   10/04/23 136 kg (299 lb 9.6 oz)   06/08/23 (!) 141 kg (311 lb)   06/08/23 (!) 141 kg (311 lb)   06/07/23 (!) 141 kg (311 lb 12.8 oz)   04/27/23 (!) 140 kg (309 lb)       Food logging:  Increased appetite/cravings:  Exercise:irregular right now- min. 2.5 hours a week   Hydration:bloom in the AM     Diet Recall (varies). Work 7-3PM  Egg and cheese on bagel or sausage/egg/cheese tortilla  Burrito bowl-br rice, pork, pepper/onion, soup/sandwich    The following portions of the patient's history were reviewed and updated as appropriate: He  has a past medical history of Aortic valve cusp abnormality, Atrial fibrillation (HCC), and Hypertension.  He   Patient Active Problem List    Diagnosis Date Noted    TARAH (obstructive sleep apnea)     Aortic aneurysm of unspecified site, without rupture ([facility]) 07/28/2021    Morbid obesity ([facility]) 02/04/2021    Visit for preventive health examination 04/24/2020    PAF (paroxysmal atrial fibrillation) ([facility]) 05/20/2019    Labral tear of shoulder, right, subsequent encounter 08/27/2018    Bicuspid aortic valve 11/22/2017    Hypertension 11/22/2017    SLAP tear of shoulder 05/26/2016    Hyperlipidemia 01/02/2015     He  has a past surgical history that includes Anterior cruciate ligament repair (Right); Vasectomy; Other surgical history; pr surgical arthroscopy shoulder repair slap lesion (Left, 5/26/2016); Knee arthroscopy (Right); Rotator cuff repair (Left); and pr surgical arthroscopy shoulder repair slap lesion (Right, 11/26/2018).  His family history includes Breast cancer in his mother; Coronary artery disease in his paternal grandfather; Diabetes type II in his paternal grandfather; Hyperlipidemia in his paternal grandfather and paternal grandmother; Hypertension in his  father, paternal grandfather, and paternal grandmother; Other in his paternal grandmother; Prostate cancer in his maternal grandfather; Stroke in his paternal grandmother.  He  reports that he has never smoked. His smokeless tobacco use includes snuff. He reports current alcohol use. He reports that he does not use drugs.  Current Outpatient Medications   Medication Sig Dispense Refill    lisinopril-hydrochlorothiazide (PRINZIDE,ZESTORETIC) 20-12.5 MG per tablet Take 2 tablets by mouth daily 60 tablet 5    Multiple Vitamins-Minerals (MULTIVITAMIN ADULT EXTRA C PO) Take by mouth      potassium chloride (Klor-Con M20) 20 mEq tablet Take 1 tablet (20 mEq total) by mouth daily 30 tablet 5    tirzepatide (Zepbound) 2.5 mg/0.5 mL auto-injector Inject 0.5 mL (2.5 mg total) under the skin once a week for 28 days 2 mL 0     No current facility-administered medications for this visit.     Current Outpatient Medications on File Prior to Visit   Medication Sig    lisinopril-hydrochlorothiazide (PRINZIDE,ZESTORETIC) 20-12.5 MG per tablet Take 2 tablets by mouth daily    Multiple Vitamins-Minerals (MULTIVITAMIN ADULT EXTRA C PO) Take by mouth    potassium chloride (Klor-Con M20) 20 mEq tablet Take 1 tablet (20 mEq total) by mouth daily     No current facility-administered medications on file prior to visit.     He is allergic to penicillins..    Review of Systems   Constitutional:  Negative for fatigue.   Respiratory:  Negative for shortness of breath.    Cardiovascular:  Negative for chest pain and palpitations.   Gastrointestinal:  Negative for abdominal pain, constipation and diarrhea.   Endocrine: Negative for cold intolerance and heat intolerance.   Genitourinary:  Negative for difficulty urinating.   Skin:  Negative for rash.   Neurological:  Negative for headaches.   Psychiatric/Behavioral:  Negative for dysphoric mood. The patient is not nervous/anxious.        Objective:    /82   Pulse 68   Temp 98.1 °F (36.7 °C)  "  Resp 16   Ht 5' 11\" (1.803 m)   Wt (!) 146 kg (322 lb 6.4 oz)   BMI 44.97 kg/m²      Physical Exam  Vitals and nursing note reviewed.   Constitutional:       General: He is not in acute distress.     Appearance: He is well-developed. He is obese.   HENT:      Head: Normocephalic and atraumatic.   Eyes:      Conjunctiva/sclera: Conjunctivae normal.   Neck:      Thyroid: No thyromegaly.   Pulmonary:      Effort: Pulmonary effort is normal. No respiratory distress.   Skin:     Findings: No rash (visible).   Neurological:      Mental Status: He is alert and oriented to person, place, and time.   Psychiatric:         Behavior: Behavior normal.        "

## 2025-03-07 ENCOUNTER — TELEPHONE (OUTPATIENT)
Dept: BARIATRICS | Facility: CLINIC | Age: 45
End: 2025-03-07

## 2025-03-07 NOTE — TELEPHONE ENCOUNTER
PA for Zepbound 2.5mg SUBMITTED to [employer]     via    [x]CMM-KEY: [id number]  []Surescripts-Case ID #   []Availity-Auth ID # NDC #   []Faxed to plan   []Other website   []Phone call Case ID #     []PA sent as URGENT    All office notes, labs and other pertaining documents and studies sent. Clinical questions answered. Awaiting determination from insurance company.     Turnaround time for your insurance to make a decision on your Prior Authorization can take 7-21 business days.

## 2025-03-07 NOTE — TELEPHONE ENCOUNTER
PA for Zepbound 2.5mg APPROVED     Date(s) approved 2/5/2025 - 11/2/2025    Case #    Patient advised by          [x][employer] Message  []Phone call   []LMOM  []L/M to call office as no active Communication consent on file  []Unable to leave detailed message as VM not approved on Communication consent       Pharmacy advised by    [x]Fax  []Phone call  []Secure Chat    Specialty Pharmacy    []     Approval letter scanned into Media No - from covermymeds

## 2025-03-18 ENCOUNTER — HOSPITAL ENCOUNTER (OUTPATIENT)
Dept: NON INVASIVE DIAGNOSTICS | Facility: CLINIC | Age: 45
Discharge: HOME | End: 2025-03-18
Payer: COMMERCIAL

## 2025-03-18 VITALS
WEIGHT: 315 LBS | HEART RATE: 64 BPM | HEIGHT: 71 IN | BODY MASS INDEX: 44.1 KG/M2 | SYSTOLIC BLOOD PRESSURE: 128 MMHG | DIASTOLIC BLOOD PRESSURE: 82 MMHG

## 2025-03-18 DIAGNOSIS — I10 PRIMARY HYPERTENSION: ICD-10-CM

## 2025-03-18 DIAGNOSIS — E66.01 MORBID OBESITY (HCC): ICD-10-CM

## 2025-03-18 DIAGNOSIS — I48.0 PAF (PAROXYSMAL ATRIAL FIBRILLATION) (HCC): ICD-10-CM

## 2025-03-18 DIAGNOSIS — G47.33 OSA (OBSTRUCTIVE SLEEP APNEA): ICD-10-CM

## 2025-03-18 DIAGNOSIS — I10 PRIMARY HYPERTENSION: Primary | ICD-10-CM

## 2025-03-18 LAB
AORTIC ROOT: 4.1 CM
AORTIC VALVE MEAN VELOCITY: 28.3 M/S
ASCENDING AORTA: 4 CM
AV AREA BY CONTINUOUS VTI: 0.9 CM2
AV AREA PEAK VELOCITY: 0.8 CM2
AV LVOT MEAN GRADIENT: 2 MMHG
AV LVOT PEAK GRADIENT: 3 MMHG
AV MEAN PRESS GRAD SYS DOP V1V2: 38 MMHG
AV ORIFICE AREA US: 0.91 CM2
AV PEAK GRADIENT: 57 MMHG
AV VELOCITY RATIO: 0.26
AV VMAX SYS DOP: 3.9 M/S
BSA FOR ECHO PROCEDURE: 2.58 M2
DOP CALC AO VTI: 87.18 CM
DOP CALC LVOT AREA: 3.46 CM2
DOP CALC LVOT CARDIAC INDEX: 1.92 L/MIN/M2
DOP CALC LVOT CARDIAC OUTPUT: 4.95 L/MIN
DOP CALC LVOT DIAMETER: 2.1 CM
DOP CALC LVOT PEAK VEL VTI: 23 CM
DOP CALC LVOT PEAK VEL: 0.91 M/S
DOP CALC LVOT STROKE INDEX: 31 ML/M2
DOP CALC LVOT STROKE VOLUME: 80
E WAVE DECELERATION TIME: 249 MS
E/A RATIO: 1.28
FRACTIONAL SHORTENING: 32 (ref 28–44)
INTERVENTRICULAR SEPTUM IN DIASTOLE (PARASTERNAL SHORT AXIS VIEW): 1.2 CM
INTERVENTRICULAR SEPTUM: 1.2 CM (ref 0.6–1.1)
LAAS-AP2: 22.3 CM2
LAAS-AP4: 19.7 CM2
LEFT ATRIUM SIZE: 3.7 CM
LEFT ATRIUM VOLUME (MOD BIPLANE): 69 ML
LEFT ATRIUM VOLUME INDEX (MOD BIPLANE): 26.7 ML/M2
LEFT INTERNAL DIMENSION IN SYSTOLE: 3.6 CM (ref 2.1–4)
LEFT VENTRICULAR INTERNAL DIMENSION IN DIASTOLE: 5.3 CM (ref 3.5–6)
LEFT VENTRICULAR POSTERIOR WALL IN END DIASTOLE: 1.2 CM
LEFT VENTRICULAR STROKE VOLUME: 82 ML
LV EF US.2D.A4C+ESTIMATED: 57 %
LVSV (TEICH): 82 ML
MV E'TISSUE VEL-SEP: 10 CM/S
MV PEAK A VEL: 0.69 M/S
MV PEAK E VEL: 88 CM/S
MV STENOSIS PRESSURE HALF TIME: 72 MS
MV VALVE AREA P 1/2 METHOD: 3.1
RIGHT ATRIUM AREA SYSTOLE A4C: 14.9 CM2
RIGHT VENTRICLE ID DIMENSION: 3.4 CM
SINOTUBULAR JUNCTION: 3.1 CM
SL CV LEFT ATRIUM LENGTH A2C: 5.6 CM
SL CV LV EF: 55
SL CV PED ECHO LEFT VENTRICLE DIASTOLIC VOLUME (MOD BIPLANE) 2D: 136 ML
SL CV PED ECHO LEFT VENTRICLE SYSTOLIC VOLUME (MOD BIPLANE) 2D: 54 ML
SL CV SINUS OF VALSALVA 2D: 4 CM
STJ: 3.1 CM
TR MAX PG: 27 MMHG
TR PEAK VELOCITY: 2.6 M/S
TRICUSPID ANNULAR PLANE SYSTOLIC EXCURSION: 2.2 CM
TRICUSPID VALVE PEAK REGURGITATION VELOCITY: 2.62 M/S

## 2025-03-18 PROCEDURE — 93306 TTE W/DOPPLER COMPLETE: CPT | Performed by: INTERNAL MEDICINE

## 2025-03-18 PROCEDURE — 93306 TTE W/DOPPLER COMPLETE: CPT

## 2025-03-18 RX ORDER — TIRZEPATIDE 5 MG/.5ML
5 INJECTION, SOLUTION SUBCUTANEOUS WEEKLY
Qty: 2 ML | Refills: 1 | Status: SHIPPED | OUTPATIENT
Start: 2025-03-18 | End: 2025-05-13

## 2025-03-19 ENCOUNTER — RESULTS FOLLOW-UP (OUTPATIENT)
Dept: CARDIOLOGY CLINIC | Facility: CLINIC | Age: 45
End: 2025-03-19

## 2025-03-19 ENCOUNTER — TELEPHONE (OUTPATIENT)
Dept: CARDIOLOGY CLINIC | Facility: CLINIC | Age: 45
End: 2025-03-19

## 2025-03-19 NOTE — TELEPHONE ENCOUNTER
Called patient and left message to schedule him for an appointment with Dr Cunningham either in [place] or [place].  I did schedule him in each office to see which he would like.   I will also send patient a [employer] message as well.    Pedrito Cunningham, DO  P  [facility]  Please call the patient regarding his abnormal result.  Echo showed worsening valve area.  Was supposed to FU 1 year after last visit.  Needs nonurgent appt within 60 days.

## 2025-03-31 ENCOUNTER — APPOINTMENT (OUTPATIENT)
Dept: RADIOLOGY | Facility: MEDICAL CENTER | Age: 45
End: 2025-03-31
Payer: COMMERCIAL

## 2025-03-31 ENCOUNTER — OFFICE VISIT (OUTPATIENT)
Dept: URGENT CARE | Facility: MEDICAL CENTER | Age: 45
End: 2025-03-31
Payer: COMMERCIAL

## 2025-03-31 VITALS
HEIGHT: 73 IN | HEART RATE: 66 BPM | TEMPERATURE: 97.1 F | OXYGEN SATURATION: 97 % | BODY MASS INDEX: 41.75 KG/M2 | SYSTOLIC BLOOD PRESSURE: 140 MMHG | WEIGHT: 315 LBS | RESPIRATION RATE: 20 BRPM | DIASTOLIC BLOOD PRESSURE: 70 MMHG

## 2025-03-31 DIAGNOSIS — M54.10 RADICULAR PAIN OF RIGHT LOWER EXTREMITY: ICD-10-CM

## 2025-03-31 DIAGNOSIS — M25.551 RIGHT HIP PAIN: Primary | ICD-10-CM

## 2025-03-31 DIAGNOSIS — M25.551 RIGHT HIP PAIN: ICD-10-CM

## 2025-03-31 PROCEDURE — S9083 URGENT CARE CENTER GLOBAL: HCPCS | Performed by: PHYSICIAN ASSISTANT

## 2025-03-31 PROCEDURE — G0382 LEV 3 HOSP TYPE B ED VISIT: HCPCS | Performed by: PHYSICIAN ASSISTANT

## 2025-03-31 PROCEDURE — 73502 X-RAY EXAM HIP UNI 2-3 VIEWS: CPT

## 2025-03-31 RX ORDER — METHYLPREDNISOLONE 4 MG/1
TABLET ORAL
Qty: 1 EACH | Refills: 0 | Status: SHIPPED | OUTPATIENT
Start: 2025-03-31

## 2025-03-31 RX ORDER — METHOCARBAMOL 500 MG/1
1500 TABLET, FILM COATED ORAL 3 TIMES DAILY
Qty: 27 TABLET | Refills: 0 | Status: SHIPPED | OUTPATIENT
Start: 2025-03-31 | End: 2025-04-03

## 2025-03-31 NOTE — PROGRESS NOTES
[facility]        NAME: Juan Diego Ivory is a 44 y.o. male  : 1980    MRN: [id number]  DATE: 2025  TIME: 4:59 PM    Assessment and Plan   Right hip pain [M25.551]  1. Right hip pain  XR hip/pelv 2-3 vws right if performed      2. Radicular pain of right lower extremity  methylPREDNISolone 4 MG tablet therapy pack    methocarbamol (ROBAXIN) 500 mg tablet        Right hip xray wet read without any acute fracture or osseous malformation. Will await formal radiology read.   Start robaxin as discussed.   Start medrol dose pack as discussed.  Continue with preventative exercises.   Follow up with ortho if worsening and/or persistent.     Patient Instructions       Follow up with PCP in 3-5 days.  Proceed to  ER if symptoms worsen.    If tests have been performed at [facility], our office will contact you with results if changes need to be made to the care plan discussed with you at the visit.  You can review your full results on [facility].    Chief Complaint     Chief Complaint   Patient presents with    Hip Pain     Right hip pain x 1 week states that there was no injury is unsure of what is causing the pain          History of Present Illness       Devante presents for evaluation of right hip/leg pain present x 1 week.   Patient has tried ice, heat, tens unit, tylenol, motrin, aleve without relief. He reports that it hurts to stand, sit, or lay down. The pain is aching, with occasional stabbing and radiating down the left. The pain starts at the right hip and straight down the outside of the leg to the toes. Patient reports he is walking weird because of this.   No changes in bowel or bladder.   Patient reports having an upper respiratory infection 1-2 weeks ago.   Denies injury, tick bites. Denies chest pain, SOB, headache, dizziness.           Review of Systems   Review of Systems   Musculoskeletal:         Right hip pain         Current Medications       Current Outpatient  "Medications:     lisinopril-hydrochlorothiazide (PRINZIDE,ZESTORETIC) 20-12.5 MG per tablet, Take 2 tablets by mouth daily, Disp: 60 tablet, Rfl: 5    methocarbamol (ROBAXIN) 500 mg tablet, Take 3 tablets (1,500 mg total) by mouth 3 (three) times a day for 3 days, Disp: 27 tablet, Rfl: 0    methylPREDNISolone 4 MG tablet therapy pack, Use as directed on package, Disp: 1 each, Rfl: 0    Multiple Vitamins-Minerals (MULTIVITAMIN ADULT EXTRA C PO), Take by mouth, Disp: , Rfl:     potassium chloride (Klor-Con M20) 20 mEq tablet, Take 1 tablet (20 mEq total) by mouth daily, Disp: 30 tablet, Rfl: 5    tirzepatide (Zepbound) 2.5 mg/0.5 mL auto-injector, Inject 0.5 mL (2.5 mg total) under the skin once a week for 28 days, Disp: 2 mL, Rfl: 0    tirzepatide (Zepbound) 5 mg/0.5 mL auto-injector, Inject 0.5 mL (5 mg total) under the skin once a week, Disp: 2 mL, Rfl: 1    Current Allergies     Allergies as of 03/31/2025 - Reviewed 03/31/2025   Allergen Reaction Noted    Penicillins Hives 05/17/2016            The following portions of the patient's history were reviewed and updated as appropriate: allergies, current medications, past family history, past medical history, past social history, past surgical history and problem list.     Past Medical History:   Diagnosis Date    Aortic valve cusp abnormality     \"bicuspid aortic valve\"    Atrial fibrillation (HCC)     Hypertension        Past Surgical History:   Procedure Laterality Date    ANTERIOR CRUCIATE LIGAMENT REPAIR Right     KNEE ARTHROSCOPY Right     OTHER SURGICAL HISTORY      SAE    AR SURGICAL ARTHROSCOPY SHOULDER REPAIR SLAP LESION Left 5/26/2016    Procedure: SHOULDER ARTHROSCOPY SLAP REPAIR;  Surgeon: Mg Burns MD;  Location: [facility];  Service: Orthopedics    AR SURGICAL ARTHROSCOPY SHOULDER REPAIR SLAP LESION Right 11/26/2018    Procedure: Shoulder Arthroscopy with labral repair and biceps tenotomy;  Surgeon: Mg Burns MD;  Location: [facility];  " "Service: Orthopedics    ROTATOR CUFF REPAIR Left     VASECTOMY         Family History   Problem Relation Age of Onset    Hypertension Father     Prostate cancer Maternal Grandfather     Other Paternal Grandmother         Carotid Stenosis     Hyperlipidemia Paternal Grandmother     Hypertension Paternal Grandmother     Stroke Paternal Grandmother     Coronary artery disease Paternal Grandfather     Hyperlipidemia Paternal Grandfather     Hypertension Paternal Grandfather     Diabetes type II Paternal Grandfather     Breast cancer Mother     Thyroid disease Neg Hx          Medications have been verified.        Objective   /70   Pulse 66   Temp (!) 97.1 °F (36.2 °C)   Resp 20   Ht 6' 1\" (1.854 m)   Wt (!) 143 kg (316 lb)   SpO2 97%   BMI 41.69 kg/m²   No LMP for male patient.       Physical Exam     Physical Exam  Vitals and nursing note reviewed.   Constitutional:       General: He is awake.      Appearance: Normal appearance. He is well-developed, well-groomed and overweight. He is not ill-appearing.   HENT:      Head: Normocephalic.      Right Ear: External ear normal.      Left Ear: External ear normal.      Nose: Nose normal.      Mouth/Throat:      Lips: Pink. No lesions.      Mouth: Mucous membranes are moist.   Eyes:      Extraocular Movements: Extraocular movements intact.      Conjunctiva/sclera: Conjunctivae normal.   Cardiovascular:      Rate and Rhythm: Normal rate and regular rhythm.      Heart sounds: Normal heart sounds. No murmur heard.     No friction rub. No gallop.   Pulmonary:      Effort: Pulmonary effort is normal. No respiratory distress.      Breath sounds: Normal breath sounds. No stridor. No wheezing, rhonchi or rales.   Musculoskeletal:      Cervical back: Normal range of motion.      Right hip: Tenderness and bony tenderness (over right lateral hip) present. Decreased range of motion.      Left hip: Normal.      Comments: Right hip pain with flexion and lateral movement "   Neurological:      Mental Status: He is alert.   Psychiatric:         Behavior: Behavior is cooperative.

## 2025-04-01 ENCOUNTER — RESULTS FOLLOW-UP (OUTPATIENT)
Dept: URGENT CARE | Facility: MEDICAL CENTER | Age: 45
End: 2025-04-01

## 2025-04-04 ENCOUNTER — OFFICE VISIT (OUTPATIENT)
Dept: CARDIOLOGY CLINIC | Facility: CLINIC | Age: 45
End: 2025-04-04
Payer: COMMERCIAL

## 2025-04-04 VITALS
SYSTOLIC BLOOD PRESSURE: 132 MMHG | HEIGHT: 73 IN | DIASTOLIC BLOOD PRESSURE: 84 MMHG | OXYGEN SATURATION: 96 % | BODY MASS INDEX: 41.08 KG/M2 | HEART RATE: 55 BPM | WEIGHT: 310 LBS

## 2025-04-04 DIAGNOSIS — I48.0 PAF (PAROXYSMAL ATRIAL FIBRILLATION) (HCC): ICD-10-CM

## 2025-04-04 DIAGNOSIS — I10 PRIMARY HYPERTENSION: ICD-10-CM

## 2025-04-04 DIAGNOSIS — Q23.81 BICUSPID AORTIC VALVE: Primary | ICD-10-CM

## 2025-04-04 DIAGNOSIS — I35.0 AORTIC VALVE STENOSIS, ETIOLOGY OF CARDIAC VALVE DISEASE UNSPECIFIED: ICD-10-CM

## 2025-04-04 DIAGNOSIS — I77.810 DILATED AORTIC ROOT (HCC): ICD-10-CM

## 2025-04-04 PROCEDURE — 99214 OFFICE O/P EST MOD 30 MIN: CPT | Performed by: INTERNAL MEDICINE

## 2025-04-04 PROCEDURE — 93000 ELECTROCARDIOGRAM COMPLETE: CPT | Performed by: INTERNAL MEDICINE

## 2025-04-04 NOTE — PROGRESS NOTES
Patient ID: Juan Diego Ivory is a 44 y.o. male.        Plan:      Bicuspid aortic valve  Mod-severe AS, asymptomatic, with mild AoRoot/Asc Ao dilatation at 4.1/4.0 cm respectively.    No indication for surgery at this time.  Reviewed severe AS symptoms in detail advised he call ASAP should any arise.  Echo 1 year planned.  Discussed suspect he will need AVR in next year or two looking at recent progression.  Discussed TAVR being tAVR and would likely only last 10 years or so, at his age surgical AVR and mAVR likely better choice, did discuss AC need.      PAF (paroxysmal atrial fibrillation) (HCC)  No recurrence    Hypertension  Fair control  Advised salt sodium restricted diet  Hopefully weight loss will also help       Follow up Plan/Other summary comments:  There is no evidence for angina, CHF, electrical instability.   Advised no medication changes today.   Return in about 1 year (around 3/16/2026).  Call sooner with issues.     HPI: Devante here for Bicuspid AV/A FU.  We reviewed his recent Echo together that shows progression of his AS, now approaching severe (mean gradient 39 mm Hg, peak V 3.9 m/s) with normal LV fxn.  He remains asymptomatic.  Trying to lose weight on Zepbound now.  Feels well.  Offers no cardiac related complaints on extensive ROS questioning.  No cp, sob, palps, unusual edema, orthopnea, pnd, (pre)syncope, tia, or claudication like sx.   Low C2V score      Results for orders placed or performed in visit on 04/04/25   POCT ECG    Impression    Sinus bradycardia 55 bpm, otherwise normal         Most recent or relevant cardiac/vascular testing:    See hpi      Past Surgical History:   Procedure Laterality Date    ANTERIOR CRUCIATE LIGAMENT REPAIR Right     KNEE ARTHROSCOPY Right     OTHER SURGICAL HISTORY      SAE    NC SURGICAL ARTHROSCOPY SHOULDER REPAIR SLAP LESION Left 5/26/2016    Procedure: SHOULDER ARTHROSCOPY SLAP REPAIR;  Surgeon: Mg Burns MD;  Location: [facility];   "Service: Orthopedics    GA SURGICAL ARTHROSCOPY SHOULDER REPAIR SLAP LESION Right 11/26/2018    Procedure: Shoulder Arthroscopy with labral repair and biceps tenotomy;  Surgeon: Mg Burns MD;  Location: [facility];  Service: Orthopedics    ROTATOR CUFF REPAIR Left     VASECTOMY         Lipid Profile: Reviewed      Review of Systems   10  point ROS  was otherwise non pertinent or negative except as per HPI or as below.         Objective:     /84   Pulse 55   Ht 6' 1\" (1.854 m)   Wt (!) 141 kg (310 lb)   SpO2 96%   BMI 40.90 kg/m²     PHYSICAL EXAM:    General:  Normal appearance in no distress.  Eyes:  Anicteric.  Oral mucosa:  Moist.  Neck:  No JVD. Carotid upstrokes are brisk without bruits.  No masses.  Chest:  Clear to auscultation.  Cardiac:  No palpable PMI.  Normal S1 and S2.  3/6 SE murmur base, no gallop or rub.  Abdomen:  Soft and nontender. No palpable organomegaly or aortic enlargement.  Extremities:  No peripheral edema.  Musculoskeletal:  Symmetric.   Vascular:  Pedal pulses are intact.  Neuro:  Grossly symmetric.  Psych:  Alert and oriented x3.      Meds reviewed.    Current Outpatient Medications:     lisinopril-hydrochlorothiazide (PRINZIDE,ZESTORETIC) 20-12.5 MG per tablet, Take 2 tablets by mouth daily, Disp: 60 tablet, Rfl: 5    methylPREDNISolone 4 MG tablet therapy pack, Use as directed on package, Disp: 1 each, Rfl: 0    Multiple Vitamins-Minerals (MULTIVITAMIN ADULT EXTRA C PO), Take by mouth, Disp: , Rfl:     potassium chloride (Klor-Con M20) 20 mEq tablet, Take 1 tablet (20 mEq total) by mouth daily, Disp: 30 tablet, Rfl: 5    tirzepatide (Zepbound) 5 mg/0.5 mL auto-injector, Inject 0.5 mL (5 mg total) under the skin once a week, Disp: 2 mL, Rfl: 1    methocarbamol (ROBAXIN) 500 mg tablet, Take 3 tablets (1,500 mg total) by mouth 3 (three) times a day for 3 days, Disp: 27 tablet, Rfl: 0     Past Medical History:   Diagnosis Date    Aortic valve cusp abnormality     \"bicuspid " "aortic valve\"    Atrial fibrillation (HCC)     Hypertension            Social History     Tobacco Use   Smoking Status Never   Smokeless Tobacco Current    Types: Snuff   Tobacco Comments    Tried Welbutrin (sp?), didnt work             "

## 2025-04-04 NOTE — PATIENT INSTRUCTIONS
"Patient Education     Aortic stenosis   The Basics   Written by the doctors and editors at [facility]   What is aortic stenosis? -- Aortic stenosis is a condition in which the aortic valve doesn't open fully (figure 1). The aortic valve is 1 of the 4 heart valves. The heart valves keep blood flowing in only 1 direction. When the heart valves work normally, they open all of the way to let blood flow through them.  Blood flows from a chamber of the heart called the left ventricle, through the aortic valve, into a large blood vessel called the aorta (figure 1). The aorta carries blood to the rest of the body.  In aortic stenosis, the aortic valve gets stuck and does not open fully. This makes the valve opening narrow. When this happens:   Not as much blood can flow out of the heart to the rest of the body.   The heart has to work much harder than usual to pump blood to the rest of the body. Over time, this can cause heart problems.  Aortic stenosis usually happens in adults. But some people are born with it.  What are the symptoms of aortic stenosis? -- Early on, most people have no symptoms. They usually find out that they have aortic stenosis after their doctor or nurse hears a heart murmur on a routine exam. A heart murmur is an extra sound in the heartbeat that doctors or nurses hear when they listen to the heart with a stethoscope.  When people do have symptoms, they can have:   Shortness of breath   Dizziness or fainting   Chest pain  These symptoms usually happen with physical activity. Tell your doctor if you have any of these symptoms.  Is there a test for aortic stenosis? -- Yes. To check for aortic stenosis and see how severe it is, your doctor might order an echocardiogram (or \"echo\"). This test uses sound waves to create a picture of your heart as it beats. It shows the size of your heart chambers, how well your heart is pumping, and how well your heart valves are working (figure 2). If you have aortic " "stenosis, your doctor might repeat this test over time to see if your condition changes.  Your doctor might order a test called an electrocardiogram (\"ECG\"). This test measures the electrical activity in your heart (figure 3).  Some people with aortic stenosis will also have a chest X-ray. A chest X-ray can show the size and shape of your heart. It can also show changes from water in your lungs caused by aortic stenosis or other diseases.  To get more information about your heart, your doctor might order a test called cardiac catheterization (or \"cardiac cath\"). For this, the doctor puts a thin tube into a blood vessel in your leg or arm. Then, they move the tube up to your heart. When the tube is in your heart or blood vessels, the doctor will take measurements. They might also put a dye that shows up on an X-ray into the tube. This can show if any of the arteries in your heart are narrowed or blocked. This part of the test is called \"coronary angiography.\"  How is aortic stenosis treated? -- It depends on your symptoms and how severe your aortic stenosis is. If your aortic stenosis is not severe and you have no symptoms, your doctor will see you regularly. This way, they will know if your aortic stenosis gets worse or if you start to have symptoms.  If your aortic stenosis is severe and you have symptoms, you will likely need treatment. Treatment can include:   A procedure to put in a new valve - This can be done with surgery or without surgery:   During surgery, the doctor will remove your narrowed valve and replace it with a valve that opens normally. This new valve can be made from metal or from tissue from an animal or another person. Your doctor will talk with you about the benefits and downsides of each option.   A procedure to put in a new aortic valve without surgery is called \"transcatheter aortic valve implantation\" (\"KELTON\") or \"transcatheter aortic valve replacement\" (\"TAVR\").   A procedure to open the " "aortic valve - A doctor inflates a balloon in the narrowed aortic valve to try to open it. This procedure is used in children and young adults, because it is helpful in these people. This procedure is usually less helpful in older adults.   Medicines - There are no medicines to treat the aortic valve stenosis itself. Your doctor might prescribe medicines to treat your symptoms. They will also make sure that your blood pressure and cholesterol levels are under control.  Can I play sports? -- You and your doctor should discuss the level of physical activity that is right for you. If your aortic stenosis is mild, you can probably play sports. But if your aortic stenosis is more serious or you have symptoms, your doctor might recommend that you limit your physical activity.  What if I want to get pregnant? -- If you want to get pregnant, talk with your doctor or nurse. Depending on your aortic stenosis and symptoms, they might recommend treating your aortic stenosis before you get pregnant.  All topics are updated as new evidence becomes available and our peer review process is complete.  This topic retrieved from [employer] on: Apr 06, 2024.  Topic 07540 Version 12.0  Release: 32.3.2 - C32.95  © 2024 UpToDate, Inc. and/or its affiliates. All rights reserved.  figure 1: Aortic stenosis     When people have aortic stenosis, the aortic valve does not open fully. This prevents blood from flowing normally from the left ventricle, through the aortic valve, and to the aorta. The direction of blood flow is shown by the black arrow. The aorta is a big blood vessel that carries blood to the rest of the body.  Graphic 28869 Version 2.0  figure 2: Transthoracic echocardiogram (echo)     This picture shows a person getting an echocardiogram (or \"echo\"). To do an echo, a doctor or nurse puts some gel on a person's chest. They press a thick wand (called a \"transducer\") against the chest and move it around. An echo uses sound waves to " "create images of the heart that appear on a computer screen. A test called an electrocardiogram (ECG) is done during an echo. For an ECG, patches (called \"electrodes\") are stuck to a person's chest. Wires run from the patches to a machine that records the electrical activity of the heart.  Graphic 79624 Version 5.0  figure 3: Person having an ECG     This drawing shows a person having an ECG (also called an electrocardiogram or EKG). There are patches, called \"electrodes,\" stuck onto the chest, arms, and legs. Wires run from the electrodes to the ECG machine. An ECG measures the electrical activity in the heart.  Graphic 28211 Version 3.0  Consumer Information Use and Disclaimer   Disclaimer: This generalized information is a limited summary of diagnosis, treatment, and/or medication information. It is not meant to be comprehensive and should be used as a tool to help the user understand and/or assess potential diagnostic and treatment options. It does NOT include all information about conditions, treatments, medications, side effects, or risks that may apply to a specific patient. It is not intended to be medical advice or a substitute for the medical advice, diagnosis, or treatment of a health care provider based on the health care provider's examination and assessment of a patient's specific and unique circumstances. Patients must speak with a health care provider for complete information about their health, medical questions, and treatment options, including any risks or benefits regarding use of medications. This information does not endorse any treatments or medications as safe, effective, or approved for treating a specific patient. UpToDate, Inc. and its affiliates disclaim any warranty or liability relating to this information or the use thereof.The use of this information is governed by the Terms of Use, available at https://www.[employer].com/en/know/clinical-effectiveness-terms. 2024© UpToDate, Inc. " and its affiliates and/or licensors. All rights reserved.  Copyright   © 2024 [employer] and/or its affiliates. All rights reserved.

## 2025-04-04 NOTE — ASSESSMENT & PLAN NOTE
Mod-severe AS, asymptomatic, with mild AoRoot/Asc Ao dilatation at 4.1/4.0 cm respectively.    No indication for surgery at this time.  Reviewed severe AS symptoms in detail advised he call ASAP should any arise.  Echo 1 year planned.  Discussed suspect he will need AVR in next year or two looking at recent progression.  Discussed TAVR being tAVR and would likely only last 10 years or so, at his age surgical AVR and mAVR likely better choice, did discuss AC need.

## 2025-04-09 PROBLEM — I35.0 SEVERE AORTIC STENOSIS: Status: ACTIVE | Noted: 2025-04-09

## 2025-04-11 ENCOUNTER — APPOINTMENT (EMERGENCY)
Dept: CT IMAGING | Facility: HOSPITAL | Age: 45
End: 2025-04-11
Payer: COMMERCIAL

## 2025-04-11 ENCOUNTER — HOSPITAL ENCOUNTER (EMERGENCY)
Facility: HOSPITAL | Age: 45
Discharge: HOME | End: 2025-04-11
Attending: EMERGENCY MEDICINE
Payer: COMMERCIAL

## 2025-04-11 VITALS
HEART RATE: 71 BPM | RESPIRATION RATE: 18 BRPM | SYSTOLIC BLOOD PRESSURE: 173 MMHG | OXYGEN SATURATION: 98 % | TEMPERATURE: 98.3 F | DIASTOLIC BLOOD PRESSURE: 104 MMHG

## 2025-04-11 DIAGNOSIS — M54.41 ACUTE RIGHT-SIDED LOW BACK PAIN WITH RIGHT-SIDED SCIATICA: Primary | ICD-10-CM

## 2025-04-11 DIAGNOSIS — M51.379 DEGENERATIVE DISC DISEASE AT L5-S1 LEVEL: ICD-10-CM

## 2025-04-11 DIAGNOSIS — S39.012A STRAIN OF LUMBAR REGION, INITIAL ENCOUNTER: ICD-10-CM

## 2025-04-11 DIAGNOSIS — M43.00 PARS DEFECT WITHOUT SPONDYLOLISTHESIS: ICD-10-CM

## 2025-04-11 PROCEDURE — 96375 TX/PRO/DX INJ NEW DRUG ADDON: CPT

## 2025-04-11 PROCEDURE — 99283 EMERGENCY DEPT VISIT LOW MDM: CPT

## 2025-04-11 PROCEDURE — 74176 CT ABD & PELVIS W/O CONTRAST: CPT

## 2025-04-11 PROCEDURE — 99285 EMERGENCY DEPT VISIT HI MDM: CPT | Performed by: EMERGENCY MEDICINE

## 2025-04-11 PROCEDURE — 96374 THER/PROPH/DIAG INJ IV PUSH: CPT

## 2025-04-11 RX ORDER — METHOCARBAMOL 500 MG/1
1000 TABLET, FILM COATED ORAL ONCE
Status: COMPLETED | OUTPATIENT
Start: 2025-04-11 | End: 2025-04-11

## 2025-04-11 RX ORDER — ACETAMINOPHEN 325 MG/1
975 TABLET ORAL ONCE
Status: COMPLETED | OUTPATIENT
Start: 2025-04-11 | End: 2025-04-11

## 2025-04-11 RX ORDER — FENTANYL CITRATE 50 UG/ML
50 INJECTION, SOLUTION INTRAMUSCULAR; INTRAVENOUS ONCE
Refills: 0 | Status: COMPLETED | OUTPATIENT
Start: 2025-04-11 | End: 2025-04-11

## 2025-04-11 RX ORDER — OXYCODONE AND ACETAMINOPHEN 5; 325 MG/1; MG/1
1 TABLET ORAL ONCE
Refills: 0 | Status: COMPLETED | OUTPATIENT
Start: 2025-04-11 | End: 2025-04-11

## 2025-04-11 RX ORDER — METHOCARBAMOL 750 MG/1
750 TABLET, FILM COATED ORAL 3 TIMES DAILY
Qty: 21 TABLET | Refills: 0 | Status: SHIPPED | OUTPATIENT
Start: 2025-04-11 | End: 2025-05-01

## 2025-04-11 RX ORDER — KETOROLAC TROMETHAMINE 30 MG/ML
30 INJECTION, SOLUTION INTRAMUSCULAR; INTRAVENOUS ONCE
Status: COMPLETED | OUTPATIENT
Start: 2025-04-11 | End: 2025-04-11

## 2025-04-11 RX ORDER — IBUPROFEN 600 MG/1
600 TABLET, FILM COATED ORAL EVERY 6 HOURS PRN
Qty: 30 TABLET | Refills: 0 | Status: SHIPPED | OUTPATIENT
Start: 2025-04-11

## 2025-04-11 RX ORDER — HYDROMORPHONE HCL IN WATER/PF 6 MG/30 ML
0.2 PATIENT CONTROLLED ANALGESIA SYRINGE INTRAVENOUS ONCE
Status: COMPLETED | OUTPATIENT
Start: 2025-04-11 | End: 2025-04-11

## 2025-04-11 RX ORDER — OXYCODONE HYDROCHLORIDE 5 MG/1
5 TABLET ORAL EVERY 6 HOURS PRN
Qty: 8 TABLET | Refills: 0 | Status: SHIPPED | OUTPATIENT
Start: 2025-04-11 | End: 2025-04-19

## 2025-04-11 RX ADMIN — OXYCODONE HYDROCHLORIDE AND ACETAMINOPHEN 1 TABLET: 5; 325 TABLET ORAL at 22:35

## 2025-04-11 RX ADMIN — ACETAMINOPHEN 975 MG: 325 TABLET, FILM COATED ORAL at 21:31

## 2025-04-11 RX ADMIN — FENTANYL CITRATE 50 MCG: 50 INJECTION INTRAMUSCULAR; INTRAVENOUS at 21:31

## 2025-04-11 RX ADMIN — HYDROMORPHONE HYDROCHLORIDE 0.2 MG: 0.2 INJECTION, SOLUTION INTRAMUSCULAR; INTRAVENOUS; SUBCUTANEOUS at 20:03

## 2025-04-11 RX ADMIN — KETOROLAC TROMETHAMINE 30 MG: 30 INJECTION, SOLUTION INTRAMUSCULAR at 20:02

## 2025-04-11 RX ADMIN — METHOCARBAMOL 1000 MG: 500 TABLET ORAL at 20:02

## 2025-04-12 NOTE — ED PROVIDER NOTES
Time reflects when diagnosis was documented in both MDM as applicable and the Disposition within this note       Time User Action Codes Description Comment    4/11/2025 10:29 PM Ruben Shin [M54.41] Acute right-sided low back pain with right-sided sciatica     4/11/2025 10:29 PM Ruben Shin [S39.012A] Strain of lumbar region, initial encounter     4/11/2025 10:29 PM Ruben Shin [M43.00] Pars defect without spondylolisthesis     4/11/2025 10:30 PM Ruben Shin [M51.379] Degenerative disc disease at L5-S1 level           ED Disposition       ED Disposition   Discharge    Condition   Stable    Date/Time   Fri Apr 11, 2025 10:31 PM    Comment   Juan Diego Dianelys discharge to home/self care.                   Assessment & Plan       Medical Decision Making  44-year-old male presenting with ongoing right lower extremity pain, right sided lumbar back pain.  Differential diagnosis includes sprain, strain, lumbar radicular pain, osteoarthritis of the hip, IT band syndrome, trochanteric bursitis, chronic postural/muscle hypertonicity/imbalance.  Mild arthritis on x-rays bilateral hips does not definitively explain patient's symptoms.  Unable to obtain MRI in the ED setting at this time no suspicion for spinal cord compression syndrome.  Will evaluate with CT imaging to see if on the basis of disc disease, foraminal stenosis, listhesis, spondylolysis, spondylosis.  If no acute findings then more likely muscular/sprain/strain and continue outpatient conservative measures referral to PT, comprehensive spine program.    Problems Addressed:  Acute right-sided low back pain with right-sided sciatica: acute illness or injury  Degenerative disc disease at L5-S1 level: acute illness or injury  Pars defect without spondylolisthesis: acute illness or injury  Strain of lumbar region, initial encounter: acute illness or injury    Amount and/or Complexity of Data Reviewed  Radiology:  ordered.    Risk  OTC drugs.  Prescription drug management.        ED Course as of 04/11/25 2235 Fri Apr 11, 2025 2228 Discussed all workup results with patient including L5 bilateral pars defect, degenerative changes and disc disease, possibility of L5 lumbar radicular symptoms, will provide additional pain medication for discharge amatory referral to comprehensive spine PT return to ER precautions.       Medications   oxyCODONE-acetaminophen (PERCOCET) 5-325 mg per tablet 1 tablet (has no administration in time range)   HYDROmorphone HCl (DILAUDID) injection 0.2 mg (0.2 mg Intravenous Given 4/11/25 2003)   ketorolac (TORADOL) injection 30 mg (30 mg Intravenous Given 4/11/25 2002)   methocarbamol (ROBAXIN) tablet 1,000 mg (1,000 mg Oral Given 4/11/25 2002)   fentaNYL injection 50 mcg (50 mcg Intravenous Given 4/11/25 2131)   acetaminophen (TYLENOL) tablet 975 mg (975 mg Oral Given 4/11/25 2131)       ED Risk Strat Scores                    No data recorded        SBIRT 22yo+      Flowsheet Row Most Recent Value   Initial Alcohol Screen: US AUDIT-C     1. How often do you have a drink containing alcohol? 0 Filed at: 04/11/2025 1919   2. How many drinks containing alcohol do you have on a typical day you are drinking?  0 Filed at: 04/11/2025 1919   3a. Male UNDER 65: How often do you have five or more drinks on one occasion? 0 Filed at: 04/11/2025 1919   3b. FEMALE Any Age, or MALE 65+: How often do you have 4 or more drinks on one occassion? 0 Filed at: 04/11/2025 1919   Audit-C Score 0 Filed at: 04/11/2025 1919   VIVI: How many times in the past year have you...    Used an illegal drug or used a prescription medication for non-medical reasons? Never Filed at: 04/11/2025 1919                            History of Present Illness       Chief Complaint   Patient presents with    Back Pain     Pt states that he started with right hip pain about 4 weeks ago and went to urgent care 2 weeks ago for it. Urgent care did  "an xray that was negative and gave him a steroid and muscle relaxer. Pt was told to come to ER if pain did not get any better. Pt complaining of lower back pain that shoots down his right leg at this time.        Past Medical History:   Diagnosis Date    Aortic valve cusp abnormality     \"bicuspid aortic valve\"    Atrial fibrillation (HCC)     Hypertension       Past Surgical History:   Procedure Laterality Date    ANTERIOR CRUCIATE LIGAMENT REPAIR Right     KNEE ARTHROSCOPY Right     OTHER SURGICAL HISTORY      [facility]E    [facility] SURGICAL ARTHROSCOPY SHOULDER REPAIR SLAP LESION Left 5/26/2016    Procedure: SHOULDER ARTHROSCOPY SLAP REPAIR;  Surgeon: Mg Burns MD;  Location: [facility];  Service: Orthopedics    [place] SURGICAL ARTHROSCOPY SHOULDER REPAIR SLAP LESION Right 11/26/2018    Procedure: Shoulder Arthroscopy with labral repair and biceps tenotomy;  Surgeon: Mg Burns MD;  Location: [facility];  Service: Orthopedics    ROTATOR CUFF REPAIR Left     VASECTOMY        Family History   Problem Relation Age of Onset    Hypertension Father     Prostate cancer Maternal Grandfather     Other Paternal Grandmother         Carotid Stenosis     Hyperlipidemia Paternal Grandmother     Hypertension Paternal Grandmother     Stroke Paternal Grandmother     Coronary artery disease Paternal Grandfather     Hyperlipidemia Paternal Grandfather     Hypertension Paternal Grandfather     Diabetes type II Paternal Grandfather     Breast cancer Mother     Thyroid disease Neg Hx       Social History     Tobacco Use    Smoking status: Never    Smokeless tobacco: Current     Types: Snuff    Tobacco comments:     Tried Welbutrin (sp?), didnt work   Vaping Use    Vaping status: Never Used   Substance Use Topics    Alcohol use: Yes     Comment: Not nearly enough    Drug use: No      E-Cigarette/Vaping    E-Cigarette Use Never User       E-Cigarette/Vaping Substances    Nicotine No     THC No     CBD No     Flavoring No     Other No     " Unknown No       I have reviewed and agree with the history as documented.     44-year-old male presenting with right lower extremity pain, back pain.  Denies significant past medical history.  States he been experiencing symptoms like this for about a month.  States that he saw weight management and part of that was increased exercise/exercise regimen has been going to the gym more frequently and has been walking on inclines on the treadmill more frequently.  Thinks it may be this contributed to some of the pain.  Complains of pain in the right lateral thigh/hip region radiates from the right lateral trochanteric region down to the right lateral knee.  Also notes pain in the right low lumbar back.  No associated numbness tingling weakness pain does not radiate down to the foot.  Has been stretching more which questions if this is exacerbating it.  Denies history of similar in the past.  Denies prior back surgery or major back injury.  Denies bowel or bladder dysfunction.  Was seen for this at urgent care and had x-rays of the right hip performed which demonstrated bilateral mild osteoarthritis.  Reports unable to see PT due to work schedule.  Taking Tylenol and Motrin without any relief.      Back Pain  Associated symptoms: no abdominal pain, no chest pain, no dysuria and no fever        Review of Systems   Constitutional:  Negative for chills and fever.   HENT:  Negative for ear pain and sore throat.    Eyes:  Negative for pain and visual disturbance.   Respiratory:  Negative for cough and shortness of breath.    Cardiovascular:  Negative for chest pain and palpitations.   Gastrointestinal:  Negative for abdominal pain and vomiting.   Genitourinary:  Negative for dysuria and hematuria.   Musculoskeletal:  Positive for back pain. Negative for arthralgias.        Right hip pain, back pain (right), right upper leg pain   Skin:  Negative for color change and rash.   Neurological:  Negative for seizures and syncope.    All other systems reviewed and are negative.          Objective       ED Triage Vitals [04/11/25 1917]   Temperature Pulse Blood Pressure Respirations SpO2 Patient Position - Orthostatic VS   98.3 °F (36.8 °C) 71 (!) 173/104 18 98 % Lying      Temp Source Heart Rate Source BP Location FiO2 (%) Pain Score    Temporal Monitor Left arm -- 1      Vitals      Date and Time Temp Pulse SpO2 Resp BP Pain Score FACES Pain Rating User   04/11/25 2131 -- -- -- -- -- 1 -- BW   04/11/25 2002 -- -- -- -- -- 1 --    04/11/25 1917 98.3 °F (36.8 °C) 71 98 % 18 173/104 1 -- AB            Physical Exam  Vitals and nursing note reviewed. Exam conducted with a chaperone present.   Constitutional:       General: He is not in acute distress.     Appearance: Normal appearance. He is well-developed. He is not ill-appearing, toxic-appearing or diaphoretic.   HENT:      Head: Normocephalic and atraumatic.      Right Ear: External ear normal.      Left Ear: External ear normal.      Nose: Nose normal.      Mouth/Throat:      Mouth: Mucous membranes are moist.      Pharynx: Oropharynx is clear.   Eyes:      Conjunctiva/sclera: Conjunctivae normal.   Cardiovascular:      Rate and Rhythm: Normal rate and regular rhythm.      Heart sounds: No murmur heard.  Pulmonary:      Effort: Pulmonary effort is normal. No respiratory distress.      Breath sounds: Normal breath sounds.   Abdominal:      Palpations: Abdomen is soft.      Tenderness: There is no abdominal tenderness.   Musculoskeletal:         General: No swelling.      Cervical back: Neck supple.      Comments: No midline spinal tenderness step-offs or deformities.  Describes pain in the right SI joint region without significant bony tenderness or soft tissue tenderness.  Mild reduced range of motion lumbar back.  Range of motion right hip normal, right knee normal, right ankle normal.  No reproducible tenderness over the lateral aspect of the right upper leg/thigh/IT band region.  No  "significant bony tenderness or reproducible tenderness at rest over the trochanteric region.   Skin:     General: Skin is warm and dry.      Capillary Refill: Capillary refill takes less than 2 seconds.   Neurological:      General: No focal deficit present.      Mental Status: He is alert. Mental status is at baseline.      Comments: 5-5 strength in bilateral lower extremities including hip flexion extension knee flexion extension plantar dorsiflexion great toe extension bilaterally.  No gross sensory deficits in the saddle region or the bilateral lower extremities.  Normal DTRs.  No myoclonus.    If due to radicular symptoms pain would most correlate to an L4 L5-S1 dermatomal region however no symptoms distal to the knee although patient does describe \"shinsplints\" type pain in the anterior shin question L5 radicular pain   Psychiatric:         Mood and Affect: Mood normal.         Results Reviewed       None            CT abdomen pelvis wo contrast   Final Interpretation by Dennis Cobian MD (04/11 2150)      No acute findings in the abdomen or pelvis.   No acute fracture identified.      Workstation performed: [id number]         CT recon only lumbar spine   Final Interpretation by Dennis Cobian MD (04/11 2202)      No fracture or traumatic subluxation.            Workstation performed: [id number]             Procedures    ED Medication and Procedure Management   Prior to Admission Medications   Prescriptions Last Dose Informant Patient Reported? Taking?   Multiple Vitamins-Minerals (MULTIVITAMIN ADULT EXTRA C PO)   Yes No   Sig: Take by mouth   lisinopril-hydrochlorothiazide (PRINZIDE,ZESTORETIC) 20-12.5 MG per tablet   No No   Sig: Take 2 tablets by mouth daily   methocarbamol (ROBAXIN) 500 mg tablet   No No   Sig: Take 3 tablets (1,500 mg total) by mouth 3 (three) times a day for 3 days   methylPREDNISolone 4 MG tablet therapy pack   No No   Sig: Use as directed on package   potassium chloride (Klor-Con M20) 20 " mEq tablet   No No   Sig: Take 1 tablet (20 mEq total) by mouth daily   tirzepatide (Zepbound) 5 mg/0.5 mL auto-injector   No No   Sig: Inject 0.5 mL (5 mg total) under the skin once a week      Facility-Administered Medications: None     Patient's Medications   Discharge Prescriptions    IBUPROFEN (MOTRIN) 600 MG TABLET    Take 1 tablet (600 mg total) by mouth every 6 (six) hours as needed for mild pain       Start Date: 4/11/2025 End Date: --       Order Dose: 600 mg       Quantity: 30 tablet    Refills: 0    METHOCARBAMOL (ROBAXIN) 750 MG TABLET    Take 1 tablet (750 mg total) by mouth 3 (three) times a day for 7 days       Start Date: 4/11/2025 End Date: 4/18/2025       Order Dose: 750 mg       Quantity: 21 tablet    Refills: 0    OXYCODONE (ROXICODONE) 5 IMMEDIATE RELEASE TABLET    Take 1 tablet (5 mg total) by mouth every 6 (six) hours as needed for severe pain for up to 8 days Max Daily Amount: 20 mg       Start Date: 4/11/2025 End Date: 4/19/2025       Order Dose: 5 mg       Quantity: 8 tablet    Refills: 0       ED SEPSIS DOCUMENTATION   Time reflects when diagnosis was documented in both MDM as applicable and the Disposition within this note       Time User Action Codes Description Comment    4/11/2025 10:29 PM Ruben Shin [M54.41] Acute right-sided low back pain with right-sided sciatica     4/11/2025 10:29 PM Ruben Shin [S39.012A] Strain of lumbar region, initial encounter     4/11/2025 10:29 PM Ruben Shin [M43.00] Pars defect without spondylolisthesis     4/11/2025 10:30 PM Ruben Shin [M51.379] Degenerative disc disease at L5-S1 level                  Ruben Shin DO  04/11/25 2789

## 2025-04-12 NOTE — DISCHARGE INSTRUCTIONS
VERTEBRAE: No acute fracture.  No acute osseous abnormality. Chronic bilateral L5 pars defects noted without significant associated listhesis.     DEGENERATIVE CHANGES: L5-S1 predominant degenerative disc disease with mild loss of height and vacuum disc phenomenon seen at this level.

## 2025-04-14 DIAGNOSIS — E66.812 OBESITY, CLASS II, BMI 35-39.9: ICD-10-CM

## 2025-04-14 DIAGNOSIS — G47.33 OSA (OBSTRUCTIVE SLEEP APNEA): Primary | ICD-10-CM

## 2025-04-14 DIAGNOSIS — I10 PRIMARY HYPERTENSION: ICD-10-CM

## 2025-04-14 RX ORDER — TIRZEPATIDE 7.5 MG/.5ML
7.5 INJECTION, SOLUTION SUBCUTANEOUS WEEKLY
Qty: 2 ML | Refills: 1 | Status: SHIPPED | OUTPATIENT
Start: 2025-04-14 | End: 2025-06-09

## 2025-04-24 ENCOUNTER — CONSULT (OUTPATIENT)
Dept: PAIN MEDICINE | Facility: MEDICAL CENTER | Age: 45
End: 2025-04-24
Payer: COMMERCIAL

## 2025-04-24 VITALS — HEIGHT: 73 IN | BODY MASS INDEX: 41.35 KG/M2 | WEIGHT: 312 LBS

## 2025-04-24 DIAGNOSIS — M43.00 PARS DEFECT WITHOUT SPONDYLOLISTHESIS: ICD-10-CM

## 2025-04-24 DIAGNOSIS — M54.41 ACUTE RIGHT-SIDED LOW BACK PAIN WITH RIGHT-SIDED SCIATICA: ICD-10-CM

## 2025-04-24 DIAGNOSIS — M51.379 DEGENERATIVE DISC DISEASE AT L5-S1 LEVEL: ICD-10-CM

## 2025-04-24 PROCEDURE — 99204 OFFICE O/P NEW MOD 45 MIN: CPT | Performed by: PHYSICAL MEDICINE & REHABILITATION

## 2025-04-24 RX ORDER — GABAPENTIN 300 MG/1
300 CAPSULE ORAL 3 TIMES DAILY
Qty: 90 CAPSULE | Refills: 1 | Status: SHIPPED | OUTPATIENT
Start: 2025-04-24 | End: 2025-06-23

## 2025-04-24 NOTE — PROGRESS NOTES
Assessment  1. Acute right-sided low back pain with right-sided sciatica    2. Pars defect without spondylolisthesis    3. Degenerative disc disease at L5-S1 level        Plan  Initiate gabapentin 300 mg nightly and titrate up to 3 times daily dosing based on tolerance.  An MRI of the lumbar spine is warranted at this time as the patient has been participating in a home exercise program without relief and had to stop this based on severe pain during activity.  He did see urgent care as well as present to the emergency department.  At this point an MRI is required to direct further care which may include epidural steroid injection or referral to one of our spine surgeons.    My impressions and treatment recommendations were discussed in detail with the patient who verbalized understanding and had no further questions.  Discharge instructions were provided. I personally saw and examined the patient and I agree with the above discussed plan of care.    Orders Placed This Encounter   Procedures    MRI lumbar spine wo contrast     Standing Status:   Future     Expected Date:   4/24/2025     Expiration Date:   4/24/2029     Scheduling Instructions:      There is no preparation for this test. Please leave your jewelry and valuables at home, wedding rings are the exception. All patients will be required to change into a hospital gown and pants.  Street clothes are not permitted in the MRI.  Magnetic nail polish must be removed prior to arrival for your test. Please bring your insurance cards, a form of photo ID and a list of your medications with you. Arrive 15 minutes prior to your appointment time in order to register. Please bring any prior CT or MRI studies of this area that were not performed at a [facility] facility.            To schedule this appointment, please contact Central Scheduling at [phone] or [phone].            Prior to your appointment, please make sure you complete the MRI Screening Form  "when you e-Check in for your appointment. This will be available starting 7 days before your appointment in [employer]. You may receive an e-mail with an activation code if you do not have a [employer] account. If you do not have access to a device, we will complete your screening at your appointment.     Reason for Exam:   back and radiating right leg pain     For OP exams needed \"URGENT\", choose the appropriate timeframe below and call Central Scheduling at [phone]. No need to speak with a Radiologist.:   Not URGENT     What is the patient's sedation requirement?:   No Sedation     Does the patient need medication for Claustrophobia? If yes, order medication at this point.:   No     Does the patient wear a life vest, have an implanted cardiac device, a stimulation device, a sleep apnea stimulator, or a breast tissue expansion device?:   No     Release to patient through [employer]:   Immediate     New Medications Ordered This Visit   Medications    gabapentin (NEURONTIN) 300 mg capsule     Sig: Take 1 capsule (300 mg total) by mouth 3 (three) times a day 1 QHS x 3 days, 1 BID x 3 days, then 1 TID     Dispense:  90 capsule     Refill:  1       History of Present Illness    Juan Diego Ivory is a 44 y.o. male seen in consultation at the request of Dr. Shin regarding acute back and radiating right leg pain.  The patient's been experiencing symptoms over the past 6 weeks without any specific inciting event or trauma but notes that this did seem to worsen after increasing some walking activity.  Since his initial evaluation in urgent care he has been completing a home exercise program but describing worsening of symptoms now moderate to severe in intensity rated as a 7-8/10.  Pain is nearly constant without any typical pattern described as cramping numbness dull and aching.  He does describe some lower extremity weakness but has not required an assistive device at this time.    Aggravating factors include standing " walking and exercise.  Alleviating factors include bending.    Diagnostic studies include CT scan of the lumbar spine.  Please see reports for full details.    He has also tried local heat or ice application and a TENS unit without benefit.    Social history negative for tobacco and marijuana use positive for 2-3 drinks per week for alcohol consumption.    He has tried over-the-counter acetaminophen and ibuprofen without relief.  He did get some mild benefit from an oral steroid course and muscle relaxers.  He has not tried any nerve pain medications.    I have personally reviewed and/or updated the patient's past medical history, past surgical history, family history, social history, current medications, allergies, and vital signs today.     Review of Systems   Constitutional:  Negative for chills and fever.   HENT:  Negative for ear pain, hearing loss, sinus pain and sore throat.    Eyes:  Negative for pain and redness.   Respiratory:  Negative for shortness of breath and wheezing.    Cardiovascular:  Negative for palpitations and leg swelling.   Gastrointestinal:  Negative for abdominal pain, constipation, nausea and vomiting.   Endocrine: Negative for polydipsia and polyuria.   Genitourinary:  Negative for difficulty urinating and hematuria.   Musculoskeletal:  Positive for arthralgias, back pain, gait problem and myalgias. Negative for joint swelling.   Skin:  Negative for rash.   Neurological:  Positive for numbness. Negative for dizziness, weakness and headaches.   Psychiatric/Behavioral:  Negative for confusion and sleep disturbance. The patient is not nervous/anxious.        Patient Active Problem List   Diagnosis    SLAP tear of shoulder    Bicuspid aortic valve    Hypertension    Hyperlipidemia    Labral tear of shoulder, right, subsequent encounter    PAF (paroxysmal atrial fibrillation) ([facility])    Visit for preventive health examination    Morbid obesity ([facility])    Aortic aneurysm of unspecified site,  "without rupture (HCC)    TARAH (obstructive sleep apnea)    Severe aortic stenosis       Past Medical History:   Diagnosis Date    Aortic valve cusp abnormality     \"bicuspid aortic valve\"    Atrial fibrillation (HCC)     Hypertension        Past Surgical History:   Procedure Laterality Date    ANTERIOR CRUCIATE LIGAMENT REPAIR Right     KNEE ARTHROSCOPY Right     OTHER SURGICAL HISTORY      SAE    [place] SURGICAL ARTHROSCOPY SHOULDER REPAIR SLAP LESION Left 5/26/2016    Procedure: SHOULDER ARTHROSCOPY SLAP REPAIR;  Surgeon: Mg Burns MD;  Location: [facility];  Service: Orthopedics    [place] SURGICAL ARTHROSCOPY SHOULDER REPAIR SLAP LESION Right 11/26/2018    Procedure: Shoulder Arthroscopy with labral repair and biceps tenotomy;  Surgeon: Mg Burns MD;  Location: [facility];  Service: Orthopedics    ROTATOR CUFF REPAIR Left     VASECTOMY         Family History   Problem Relation Age of Onset    Hypertension Father     Prostate cancer Maternal Grandfather     Other Paternal Grandmother         Carotid Stenosis     Hyperlipidemia Paternal Grandmother     Hypertension Paternal Grandmother     Stroke Paternal Grandmother     Coronary artery disease Paternal Grandfather     Hyperlipidemia Paternal Grandfather     Hypertension Paternal Grandfather     Diabetes type II Paternal Grandfather     Breast cancer Mother     Thyroid disease Neg Hx        Social History     Occupational History    Not on file   Tobacco Use    Smoking status: Never    Smokeless tobacco: Former     Types: Snuff     Quit date: 04/2024    Tobacco comments:     Tried Welbutrin (sp?), didnt work   Vaping Use    Vaping status: Never Used   Substance and Sexual Activity    Alcohol use: Yes    Drug use: No    Sexual activity: Yes     Partners: Female     Birth control/protection: Male Sterilization       Current Outpatient Medications on File Prior to Visit   Medication Sig    ibuprofen (MOTRIN) 600 mg tablet Take 1 tablet (600 mg total) by mouth every 6 " "(six) hours as needed for mild pain    lisinopril-hydrochlorothiazide (PRINZIDE,ZESTORETIC) 20-12.5 MG per tablet Take 2 tablets by mouth daily    Multiple Vitamins-Minerals (MULTIVITAMIN ADULT EXTRA C PO) Take by mouth    potassium chloride (Klor-Con M20) 20 mEq tablet Take 1 tablet (20 mEq total) by mouth daily    tirzepatide (Zepbound) 5 mg/0.5 mL auto-injector Inject 0.5 mL (5 mg total) under the skin once a week    tirzepatide (Zepbound) 7.5 mg/0.5 mL auto-injector Inject 0.5 mL (7.5 mg total) under the skin once a week    methocarbamol (ROBAXIN) 500 mg tablet Take 3 tablets (1,500 mg total) by mouth 3 (three) times a day for 3 days    methocarbamol (ROBAXIN) 750 mg tablet Take 1 tablet (750 mg total) by mouth 3 (three) times a day for 7 days    methylPREDNISolone 4 MG tablet therapy pack Use as directed on package     No current facility-administered medications on file prior to visit.       Allergies   Allergen Reactions    Penicillins Hives       Physical Exam    Ht 6' 1\" (1.854 m)   Wt (!) 142 kg (312 lb)   BMI 41.16 kg/m²     Constitutional: normal, well developed, well nourished, alert, in mild distress and does appear to be in pain with certain movements.  Behavior.  Eyes: anicteric  HEENT: grossly intact  Neck: supple, symmetric, trachea midline and no masses   Pulmonary:even and unlabored  Cardiovascular:No edema or pitting edema present  Skin:Normal without rashes or lesions and well hydrated  Psychiatric:Mood and affect appropriate  Neurologic:Cranial Nerves II-XII grossly intact, bilateral lower extremity strength is essentially normal but he does have some pain inhibition with right hip flexion and knee extension, bilateral lower extremity muscle stretch reflexes are physiologic and symmetric at the knees and ankles, positive straight leg raise on the right from a seated position  Musculoskeletal:normal      Imaging    Study Result    Narrative & Impression   CT RECON ONLY LUMBAR SPINE (NO " CHARGE)     INDICATION:   right sided low back pain.     COMPARISON:  None     TECHNIQUE: Axial CT examination of the lumbar spine was obtained utilizing reconstructed images from CT of the chest, abdomen and pelvis performed the same day.  Images were reformatted in the sagittal and coronal planes.     This examination, like all CT scans performed in the [facility], was performed utilizing techniques to minimize radiation dose exposure, including the use of iterative reconstruction and automated exposure control.     FINDINGS:     ALIGNMENT: Normal alignment. No spondylolisthesis.  No scoliosis.     VERTEBRAE: No acute fracture.  No acute osseous abnormality. Chronic bilateral L5 pars defects noted without significant associated listhesis.     DEGENERATIVE CHANGES: L5-S1 predominant degenerative disc disease with mild loss of height and vacuum disc phenomenon seen at this level.     PREVERTEBRAL AND PARASPINAL SOFT TISSUES: Normal.     OTHER: Unremarkable     IMPRESSION:     No fracture or traumatic subluxation.           Workstation performed: [id number]

## 2025-04-29 ENCOUNTER — TELEPHONE (OUTPATIENT)
Age: 45
End: 2025-04-29

## 2025-04-29 NOTE — TELEPHONE ENCOUNTER
Caller: Devante SHARMA    Doctor: Dr. Bonds     Reason for call: Pt returned the call in regard to the MRI . There are no notes     Call back#: [phone]

## 2025-05-01 DIAGNOSIS — M54.16 LUMBAR RADICULITIS: Primary | ICD-10-CM

## 2025-05-01 RX ORDER — BACLOFEN 10 MG/1
5 TABLET ORAL 3 TIMES DAILY
Qty: 23 TABLET | Refills: 0 | Status: SHIPPED | OUTPATIENT
Start: 2025-05-01 | End: 2025-05-16

## 2025-05-02 ENCOUNTER — HOSPITAL ENCOUNTER (OUTPATIENT)
Dept: RADIOLOGY | Facility: MEDICAL CENTER | Age: 45
End: 2025-05-02
Attending: PHYSICAL MEDICINE & REHABILITATION
Payer: COMMERCIAL

## 2025-05-02 VITALS
HEART RATE: 58 BPM | OXYGEN SATURATION: 97 % | RESPIRATION RATE: 20 BRPM | SYSTOLIC BLOOD PRESSURE: 138 MMHG | DIASTOLIC BLOOD PRESSURE: 85 MMHG | TEMPERATURE: 97.9 F

## 2025-05-02 DIAGNOSIS — M54.16 LUMBAR RADICULOPATHY: ICD-10-CM

## 2025-05-02 PROCEDURE — 64483 NJX AA&/STRD TFRM EPI L/S 1: CPT | Performed by: PHYSICAL MEDICINE & REHABILITATION

## 2025-05-02 RX ORDER — PAPAVERINE HCL 150 MG
10 CAPSULE, EXTENDED RELEASE ORAL ONCE
Status: COMPLETED | OUTPATIENT
Start: 2025-05-02 | End: 2025-05-02

## 2025-05-02 RX ADMIN — IOHEXOL 2 ML: 300 INJECTION, SOLUTION INTRAVENOUS at 08:42

## 2025-05-02 RX ADMIN — DEXAMETHASONE SODIUM PHOSPHATE 10 MG: 10 INJECTION INTRAMUSCULAR; INTRAVENOUS at 08:42

## 2025-05-02 NOTE — H&P
"History of Present Illness: The patient is a 44 y.o. male who presents with complaints of right back and leg pain    Past Medical History:   Diagnosis Date    Aortic valve cusp abnormality     \"bicuspid aortic valve\"    Atrial fibrillation (HCC)     Hypertension        Past Surgical History:   Procedure Laterality Date    ANTERIOR CRUCIATE LIGAMENT REPAIR Right     KNEE ARTHROSCOPY Right     OTHER SURGICAL HISTORY      SAE    [place] SURGICAL ARTHROSCOPY SHOULDER REPAIR SLAP LESION Left 5/26/2016    Procedure: SHOULDER ARTHROSCOPY SLAP REPAIR;  Surgeon: Mg Burns MD;  [facility]: [facility];  Service: Orthopedics    [place] SURGICAL ARTHROSCOPY SHOULDER REPAIR SLAP LESION Right 11/26/2018    Procedure: Shoulder Arthroscopy with labral repair and biceps tenotomy;  Surgeon: Mg Burns MD;  Location: [facility];  Service: Orthopedics    ROTATOR CUFF REPAIR Left     VASECTOMY           Current Outpatient Medications:     baclofen 10 mg tablet, Take 0.5 tablets (5 mg total) by mouth 3 (three) times a day for 15 days, Disp: 23 tablet, Rfl: 0    gabapentin (NEURONTIN) 300 mg capsule, Take 1 capsule (300 mg total) by mouth 3 (three) times a day 1 QHS x 3 days, 1 BID x 3 days, then 1 TID, Disp: 90 capsule, Rfl: 1    ibuprofen (MOTRIN) 600 mg tablet, Take 1 tablet (600 mg total) by mouth every 6 (six) hours as needed for mild pain, Disp: 30 tablet, Rfl: 0    lisinopril-hydrochlorothiazide (PRINZIDE,ZESTORETIC) 20-12.5 MG per tablet, Take 2 tablets by mouth daily, Disp: 60 tablet, Rfl: 5    Multiple Vitamins-Minerals (MULTIVITAMIN ADULT EXTRA C PO), Take by mouth, Disp: , Rfl:     potassium chloride (Klor-Con M20) 20 mEq tablet, Take 1 tablet (20 mEq total) by mouth daily, Disp: 30 tablet, Rfl: 5    tirzepatide (Zepbound) 5 mg/0.5 mL auto-injector, Inject 0.5 mL (5 mg total) under the skin once a week, Disp: 2 mL, Rfl: 1    tirzepatide (Zepbound) 7.5 mg/0.5 mL auto-injector, Inject 0.5 mL (7.5 mg total) under the skin once a " week, Disp: 2 mL, Rfl: 1    Allergies   Allergen Reactions    Penicillins Hives       Physical Exam:   Vitals:    05/02/25 0821   BP: 122/84   Pulse: 65   Resp: 18   Temp: 97.9 °F (36.6 °C)   SpO2: 96%     General: Awake, Alert, Oriented x 3, Mood and affect appropriate  Respiratory: Respirations even and unlabored  Cardiovascular: Peripheral pulses intact; no edema  Musculoskeletal Exam: right back and leg pain    ASA Score: 3    Patient/Chart Verification  Patient ID Verified: Verbal  ID Band Applied: No  Consents Confirmed: To be obtained in the Procedural area  Interval H&P(within 24 hr) Complete (required for Outpatients and Surgery Admit only): To be obtained in the Procedural area  Allergies Reviewed: Yes  Anticoag/NSAID held?: NA  Currently on antibiotics?: No    Assessment:   1. Lumbar radiculopathy        Plan: (R) L5-S1 TFESI ([id number])

## 2025-05-02 NOTE — DISCHARGE INSTR - LAB
Epidural Steroid Injection   WHAT YOU NEED TO KNOW:   An epidural steroid injection (LUCY) is a procedure to inject steroid medicine into the epidural space. The epidural space is between your spinal cord and vertebrae. Steroids reduce inflammation and fluid buildup in your spine that may be causing pain. You may be given pain medicine along with the steroids.          ACTIVITY  Do not drive or operate machinery today.  No strenuous activity today - bending, lifting, etc.  You may resume normal activites starting tomorrow - start slowly and as tolerated.  You may shower today, but no tub baths or hot tubs.  You may have numbness for several hours from the local anesthetic. Please use caution and common sense, especially with weight-bearing activities.    CARE OF THE INJECTION SITE  If you have soreness or pain, apply ice to the area today (20 minutes on/20 minutes off).  Starting tomorrow, you may use warm, moist heat or ice if needed.  You may have an increase or change in your discomfort for 36-48 hours after your treatment.  Apply ice and continue with any pain medication you have been prescribed.  Notify the [facility] if you have any of the following: redness, drainage, swelling, headache, stiff neck or fever above 100°F.    SPECIAL INSTRUCTIONS  Our office will contact you in approximately 14 days for a progress report.    MEDICATIONS  Continue to take all routine medications.  Our office may have instructed you to hold some medications.    As no general anesthesia was used in today's procedure, you should not experience any side effects related to anesthesia.     If you are diabetic, the steroids used in today's injection may temporarily increase your blood sugar levels after the first few days after your injection. Please keep a close eye on your sugars and alert the doctor who manages your diabetes if your sugars are significantly high from your baseline or you are symptomatic.     If you have a  problem specifically related to your procedure, please call our office at [phone].  Problems not related to your procedure should be directed to your primary care physician.

## 2025-05-06 ENCOUNTER — OFFICE VISIT (OUTPATIENT)
Dept: FAMILY MEDICINE CLINIC | Facility: CLINIC | Age: 45
End: 2025-05-06
Payer: COMMERCIAL

## 2025-05-06 VITALS
HEIGHT: 73 IN | WEIGHT: 308 LBS | OXYGEN SATURATION: 95 % | BODY MASS INDEX: 40.82 KG/M2 | RESPIRATION RATE: 18 BRPM | HEART RATE: 86 BPM | TEMPERATURE: 97.6 F | SYSTOLIC BLOOD PRESSURE: 124 MMHG | DIASTOLIC BLOOD PRESSURE: 78 MMHG

## 2025-05-06 DIAGNOSIS — M54.16 LUMBAR RADICULOPATHY: Primary | ICD-10-CM

## 2025-05-06 DIAGNOSIS — I10 HYPERTENSION, UNSPECIFIED TYPE: ICD-10-CM

## 2025-05-06 DIAGNOSIS — Q23.1 CONGENITAL INSUFFICIENCY OF AORTIC VALVE: ICD-10-CM

## 2025-05-06 DIAGNOSIS — Q23.81 BICUSPID AORTIC VALVE: ICD-10-CM

## 2025-05-06 PROCEDURE — 99214 OFFICE O/P EST MOD 30 MIN: CPT | Performed by: INTERNAL MEDICINE

## 2025-05-06 NOTE — ASSESSMENT & PLAN NOTE
He had injection las week and has had some improvement thus far He will followup with pain mgmt via phone in the coming week and then as needed   He did start increased zepbound for continued weight loss efforts

## 2025-05-06 NOTE — PROGRESS NOTES
Name: Juan Diego Ivory      : 1980      MRN: [id number]  Encounter Provider: Letitia Valdes DO  Encounter Date: 2025   Encounter department: [facility]  :  Assessment & Plan  Congenital insufficiency of aortic valve  Pt saw cardio recently and continued monitoring of valve He is asxs at this time and plan echo in one year unless sxs develop        Lumbar radiculopathy  He had injection las week and has had some improvement thus far He will followup with pain mgmt via phone in the coming week and then as needed   He did start increased zepbound for continued weight loss efforts        Hypertension, unspecified type    Orders:    Comprehensive metabolic panel; Future    Lipid panel; Future  Lo sodium diet He will gradually resume exercise as the back sxs stabilize   Bicuspid aortic valve  Pt following with Cardiology          Rto 6months     Depression Screening and Follow-up Plan: Patient was screened for depression during today's encounter. They screened negative with a PHQ-2 score of 1.        History of Present Illness   HPI  Pt had injection for LS spine last week and sxs are better than they were He has some pain but improved mobility He is on gabapentin and muscle relaxant No chest pain or sob He had started exercising but back sxs have lulled that for now His zepbound was recently increased and he just started higher dose He is looking at [facility] probably end of the year   Review of Systems   Constitutional:  Negative for chills and fever.   HENT: Negative.     Eyes:  Negative for visual disturbance.   Respiratory:  Negative for cough and shortness of breath.    Cardiovascular: Negative.    Gastrointestinal: Negative.    Genitourinary: Negative.    Musculoskeletal:  Positive for arthralgias and back pain.   Neurological:  Negative for dizziness, light-headedness and headaches.   Psychiatric/Behavioral:  Negative for sleep disturbance. The patient is not nervous/anxious.   "    Past Medical History:   Diagnosis Date    Aortic valve cusp abnormality     \"bicuspid aortic valve\"    Atrial fibrillation (HCC)     Hypertension      Past Surgical History:   Procedure Laterality Date    ANTERIOR CRUCIATE LIGAMENT REPAIR Right     KNEE ARTHROSCOPY Right     OTHER SURGICAL HISTORY      SAE    [place] SURGICAL ARTHROSCOPY SHOULDER REPAIR SLAP LESION Left 5/26/2016    Procedure: SHOULDER ARTHROSCOPY SLAP REPAIR;  Surgeon: Mg Burns MD;  [facility]: [facility];  Service: Orthopedics    [place] SURGICAL ARTHROSCOPY SHOULDER REPAIR SLAP LESION Right 11/26/2018    Procedure: Shoulder Arthroscopy with labral repair and biceps tenotomy;  Surgeon: Mg Burns MD;  Location: [facility];  Service: Orthopedics    ROTATOR CUFF REPAIR Left     VASECTOMY       Social History     Socioeconomic History    Marital status:      Spouse name: Not on file    Number of children: Not on file    Years of education: Not on file    Highest education level: Not on file   Occupational History    Not on file   Tobacco Use    Smoking status: Never    Smokeless tobacco: Former     Types: Snuff     Quit date: 04/2024    Tobacco comments:     Tried Welbutrin (sp?), didnt work   Vaping Use    Vaping status: Never Used   Substance and Sexual Activity    Alcohol use: Yes    Drug use: No    Sexual activity: Yes     Partners: Female     Birth control/protection: Male Sterilization   Other Topics Concern    Not on file   Social History Narrative    Always uses seat belt     Caffeine Use     Dental care regularly           Social Drivers of Health     Financial Resource Strain: Not on file   Food Insecurity: Not on file   Transportation Needs: Not on file   Physical Activity: Not on file   Stress: Not on file   Social Connections: Not on file   Intimate Partner Violence: Not on file   Housing Stability: Not on file     Allergies   Allergen Reactions    Penicillins Hives       Objective   /78   Pulse 86   Temp 97.6 °F " "(36.4 °C) (Temporal)   Resp 18   Ht 6' 1\" (1.854 m)   Wt (!) 140 kg (308 lb)   SpO2 95%   BMI 40.64 kg/m²      Physical Exam  Vitals and nursing note reviewed.   Constitutional:       General: He is not in acute distress.     Appearance: Normal appearance. He is not ill-appearing, toxic-appearing or diaphoretic.   HENT:      Head: Normocephalic and atraumatic.      Right Ear: External ear normal.      Left Ear: External ear normal.      Nose: Nose normal.      Mouth/Throat:      Mouth: Mucous membranes are moist.   Eyes:      General: No scleral icterus.     Extraocular Movements: Extraocular movements intact.      Conjunctiva/sclera: Conjunctivae normal.      Pupils: Pupils are equal, round, and reactive to light.   Cardiovascular:      Rate and Rhythm: Normal rate and regular rhythm.      Pulses: Normal pulses.      Heart sounds: Normal heart sounds. No murmur heard.  Pulmonary:      Effort: Pulmonary effort is normal. No respiratory distress.      Breath sounds: Normal breath sounds. No wheezing.   Abdominal:      General: Bowel sounds are normal. There is no distension.      Palpations: Abdomen is soft.      Tenderness: There is no abdominal tenderness.   Musculoskeletal:      Cervical back: Normal range of motion and neck supple.      Right lower leg: No edema.      Left lower leg: No edema.   Lymphadenopathy:      Cervical: No cervical adenopathy.   Skin:     General: Skin is warm and dry.      Coloration: Skin is not jaundiced or pale.   Neurological:      General: No focal deficit present.      Mental Status: He is alert and oriented to person, place, and time. Mental status is at baseline.      Cranial Nerves: No cranial nerve deficit.      Sensory: No sensory deficit.   Psychiatric:         Mood and Affect: Mood normal.         Behavior: Behavior normal.         Thought Content: Thought content normal.         Judgment: Judgment normal.         "

## 2025-05-06 NOTE — ASSESSMENT & PLAN NOTE
Orders:    Comprehensive metabolic panel; Future    Lipid panel; Future  Lo sodium diet He will gradually resume exercise as the back sxs stabilize

## 2025-05-06 NOTE — ASSESSMENT & PLAN NOTE
Pt saw cardio recently and continued monitoring of valve He is asxs at this time and plan echo in one year unless sxs develop

## 2025-05-16 ENCOUNTER — TELEPHONE (OUTPATIENT)
Dept: PAIN MEDICINE | Facility: CLINIC | Age: 45
End: 2025-05-16

## 2025-05-19 DIAGNOSIS — Z00.6 ENCOUNTER FOR EXAMINATION FOR NORMAL COMPARISON OR CONTROL IN CLINICAL RESEARCH PROGRAM: ICD-10-CM

## 2025-05-22 ENCOUNTER — HOSPITAL ENCOUNTER (OUTPATIENT)
Dept: MRI IMAGING | Facility: HOSPITAL | Age: 45
Discharge: HOME | End: 2025-05-22
Payer: COMMERCIAL

## 2025-05-22 DIAGNOSIS — M54.41 ACUTE RIGHT-SIDED LOW BACK PAIN WITH RIGHT-SIDED SCIATICA: ICD-10-CM

## 2025-05-22 PROCEDURE — 72148 MRI LUMBAR SPINE W/O DYE: CPT

## 2025-05-23 ENCOUNTER — OFFICE VISIT (OUTPATIENT)
Dept: PAIN MEDICINE | Facility: MEDICAL CENTER | Age: 45
End: 2025-05-23

## 2025-05-23 VITALS — BODY MASS INDEX: 40.16 KG/M2 | WEIGHT: 303 LBS | HEIGHT: 73 IN

## 2025-05-23 DIAGNOSIS — M51.379 DEGENERATIVE DISC DISEASE AT L5-S1 LEVEL: ICD-10-CM

## 2025-05-23 DIAGNOSIS — M54.41 ACUTE RIGHT-SIDED LOW BACK PAIN WITH RIGHT-SIDED SCIATICA: Primary | ICD-10-CM

## 2025-05-23 DIAGNOSIS — M79.2 NEUROPATHIC PAIN: ICD-10-CM

## 2025-05-23 DIAGNOSIS — M43.00 PARS DEFECT WITHOUT SPONDYLOLISTHESIS: ICD-10-CM

## 2025-05-23 RX ORDER — MELOXICAM 15 MG/1
15 TABLET ORAL DAILY
Qty: 30 TABLET | Refills: 1 | Status: SHIPPED | OUTPATIENT
Start: 2025-05-23

## 2025-05-23 RX ORDER — PREGABALIN 75 MG/1
75 CAPSULE ORAL 2 TIMES DAILY
Qty: 60 CAPSULE | Refills: 1 | Status: SHIPPED | OUTPATIENT
Start: 2025-05-23

## 2025-05-23 NOTE — PROGRESS NOTES
Name: JuanD iego Ivory      : 1980      MRN: [id number]  Encounter Provider: Lisa Brooks PA-C  Encounter Date: 2025   Encounter department: [facility] [place]  :  Assessment & Plan  Acute right-sided low back pain with right-sided sciatica    Orders:    meloxicam (MOBIC) 15 mg tablet; Take 1 tablet (15 mg total) by mouth daily    Pars defect without spondylolisthesis         Degenerative disc disease at L5-S1 level         Neuropathic pain    Orders:    pregabalin (LYRICA) 75 mg capsule; Take 1 capsule (75 mg total) by mouth 2 (two) times a day      At this time, we will discontinue the use of gabapentin 300 mg 3 times daily due to patient experiencing side effect of brain fog.  Will transition patient to Lyrica 75 mg twice daily.  I discussed with the patient the type of medication it is, how it works, and that it requires a titration process that is specific to each individual. I reviewed with the patient that it may take 3-4 weeks for the medication's effects to be noticed and that it should never be abruptly stopped. Possible side effects include but are not limited to; vertigo, lethargy, nausea, and edema of the extremities. Advised the patient to call our office if they experience any side effects. The patient verbalized an understanding.    Start meloxicam as prescribed.  Discussed with patient he should not take this medication with other anti-inflammatory such as ibuprofen and Aleve.  Patient agreeable and verbalized understanding.    May continue baclofen as prescribed.    Patient underwent lumbar spine MRI yesterday, 2025.  Final radiology report unavailable at the time of today's visit.    Follow-up pending MRI results.    [place] Prescription Drug Monitoring Program report was reviewed and was appropriate      My impressions and treatment recommendations were discussed in detail with the patient who verbalized understanding and had no further questions.   "Discharge instructions were provided. I personally saw and examined the patient and I agree with the above discussed plan of care.    History of Present Illness     Juan Diego Ivory is a 45 y.o. male who presents for a follow up office visit after undergoing right L5-S1 TFESI on 5/2/2025.  Patient reports experiencing approximately 80% symptom relief lasting 4 days prior to his pain returning to his baseline.  Patient continues to locate his pain to the right side of the low back.  He describes his pain as a constant sharp and cramping pain.  He rates his pain today 4/10 on the numeric rating scale.  Admits radiating pain along the lateral aspect of the right thigh and into the anterior aspect of the right lower leg.  Denies numbness, tingling, weakness of bilateral lower extremities.  Admits to use of baclofen and gabapentin 300 mg 3 times daily for symptom management.  Patient notes experiencing brain fog with the use of gabapentin.    Patient underwent lumbar spine MRI yesterday, 5/22/2025.  Final radiology report unavailable at time of today's visit.    Review of Systems   Respiratory:  Negative for shortness of breath.    Cardiovascular:  Negative for chest pain.   Gastrointestinal:  Negative for constipation, diarrhea, nausea and vomiting.   Musculoskeletal:  Positive for back pain, gait problem and myalgias. Negative for arthralgias and joint swelling.   Skin:  Negative for rash.   Neurological:  Positive for weakness. Negative for dizziness and seizures.   All other systems reviewed and are negative.      Medical History Reviewed by provider this encounter:     .  Medications Ordered Prior to Encounter[1]      Objective   Ht 6' 1\" (1.854 m)   Wt (!) 137 kg (303 lb)   BMI 39.98 kg/m²      Pain Score:   4  Physical Exam    Constitutional:normal, well developed, well nourished, alert, in no distress and non-toxic and no overt pain behavior.  Eyes:anicteric  HEENT:grossly intact  Neck:supple, symmetric, " trachea midline and no masses   Pulmonary:even and unlabored  Cardiovascular:No edema or pitting edema present  Skin:Normal without rashes or lesions and well hydrated  Psychiatric:Mood and affect appropriate  Neurologic:Cranial Nerves II-XII grossly intact, bilateral lower extremity strength normal, positive seated straight leg raise on the right side  Musculoskeletal: normal gait    Radiology Results Review : No pertinent imaging studies reviewed.           [1]   Current Outpatient Medications on File Prior to Visit   Medication Sig Dispense Refill    lisinopril-hydrochlorothiazide (PRINZIDE,ZESTORETIC) 20-12.5 MG per tablet Take 2 tablets by mouth daily 60 tablet 5    Multiple Vitamins-Minerals (MULTIVITAMIN ADULT EXTRA C PO) Take by mouth      potassium chloride (Klor-Con M20) 20 mEq tablet Take 1 tablet (20 mEq total) by mouth daily 30 tablet 5    tirzepatide (Zepbound) 7.5 mg/0.5 mL auto-injector Inject 0.5 mL (7.5 mg total) under the skin once a week 2 mL 1    [DISCONTINUED] gabapentin (NEURONTIN) 300 mg capsule Take 1 capsule (300 mg total) by mouth 3 (three) times a day 1 QHS x 3 days, 1 BID x 3 days, then 1 TID 90 capsule 1    baclofen 10 mg tablet Take 0.5 tablets (5 mg total) by mouth 3 (three) times a day for 15 days 23 tablet 0    ibuprofen (MOTRIN) 600 mg tablet Take 1 tablet (600 mg total) by mouth every 6 (six) hours as needed for mild pain 30 tablet 0     No current facility-administered medications on file prior to visit.

## 2025-05-30 DIAGNOSIS — M54.16 LUMBAR RADICULOPATHY: Primary | ICD-10-CM

## 2025-06-04 ENCOUNTER — HOSPITAL ENCOUNTER (OUTPATIENT)
Dept: RADIOLOGY | Facility: HOSPITAL | Age: 45
Discharge: HOME | End: 2025-06-04
Attending: ORTHOPAEDIC SURGERY
Payer: COMMERCIAL

## 2025-06-04 ENCOUNTER — OFFICE VISIT (OUTPATIENT)
Dept: OBGYN CLINIC | Facility: HOSPITAL | Age: 45
End: 2025-06-04
Attending: PHYSICAL MEDICINE & REHABILITATION
Payer: COMMERCIAL

## 2025-06-04 VITALS — HEIGHT: 73 IN | WEIGHT: 302.03 LBS | BODY MASS INDEX: 40.03 KG/M2

## 2025-06-04 DIAGNOSIS — M51.369 DEGENERATION OF INTERVERTEBRAL DISC OF LUMBAR REGION, UNSPECIFIED WHETHER PAIN PRESENT: Primary | ICD-10-CM

## 2025-06-04 DIAGNOSIS — M51.369 DEGENERATION OF INTERVERTEBRAL DISC OF LUMBAR REGION, UNSPECIFIED WHETHER PAIN PRESENT: ICD-10-CM

## 2025-06-04 DIAGNOSIS — M43.10 ISTHMIC SPONDYLOLISTHESIS: ICD-10-CM

## 2025-06-04 DIAGNOSIS — M54.16 RADICULOPATHY, LUMBAR REGION: ICD-10-CM

## 2025-06-04 PROCEDURE — 72110 X-RAY EXAM L-2 SPINE 4/>VWS: CPT

## 2025-06-04 PROCEDURE — 99244 OFF/OP CNSLTJ NEW/EST MOD 40: CPT | Performed by: ORTHOPAEDIC SURGERY

## 2025-06-04 NOTE — LETTER
2025     Letitia Valdes DO  [address] [address]    Patient: Juan Diego Ivory   YOB: 1980   Date of Visit: 2025       Dear DO Ti Cisneros DO:    Thank you for referring Juan Diego Ivory to me for evaluation. Below are my notes for this consultation.    If you have questions, please do not hesitate to call me. I look forward to following your patient along with you.         Sincerely,        Romero Rosales MD        CC: DO Romero Mohr MD  2025  1:40 PM  Sign when Signing Visit  Name: Juan Diego Ivory      : 1980       MRN: [id number]   Encounter Provider: Romero Rosales MD   Encounter Date: 25  Encounter department: [facility]    Medical Decision Making:     Assessment & Plan  Degeneration of intervertebral disc of lumbar region, unspecified whether pain present    The clinical, physical and imaging findings were reviewed with the patient  Discussed the etiology and pathomechanics of lumbar disc degeneration, lumbar radiculopathy, L5 bilateral pars defect with isthmic spondylolisthesis  Lumbar MRI reviewed in detail with the patient  Discussed operative and non operative treatment options   Did discuss role of surgery in form of L5-S1 decompression with instrumented spinal fusion to address foraminal stenosis and isthmic spondylolisthesis. He continues with back and right lower extremity pain despite oral medications and interventional spine procedures. His pain does continue to impact his daily activities and functioning. He remains overall neurologically intact. Did discuss how he would likely be out of work for at least 3 months after surgery, possible 6 months.  We did explain that some patient never return to work after lumbar fusion surgery.  He is planning to retire this upcoming December and is unable to take  time off and would need to delay any operative intervention at this time.  Non operative treatment options include physical therapy, at home exercises, activity modifications, chiropractic medicine, acupuncture, oral medications, and interventional spine procedures  Recommend physical therapy/HEP to work on core strengthening, lumbar ROM, strengthening, and stretching exercises.  Referral provided. Can try traction exercises.  Follow up with pain management for repeat injection, he has upcoming right L5-S1 TFESI schedule for 6/13/2025. Discussed potential role of steroid injection at or near the source of pain to provide targeted relief.   Ice/heat, OTC pain medication as needed.  Follow-up:  Return in about 6 months after further conservative treatments if symptoms persist and he wishes to pursue surgical intervention.    Orders:  •  XR spine lumbar minimum 4 views non injury; Future    Radiculopathy, lumbar region  See plan above       Isthmic spondylolisthesis  See plan above                Diagnostic Tests   IMAGING: I have personally reviewed the images and these are my findings:  Lumbar Spine X-rays from 6/4/2025: multi level lumbar spondylosis with loss of disc height, facet hypertrophy, L5-S1 spondylolisthesis, no appreciated lytic/blastic lesions, no obvious instability    Lumbar Spine MRI from 5/22/2025: multi level lumbar disc degeneration with disc desiccation & loss of disc height notably L5-S1, bilateral pars defect L5, right sided L5-S1 foraminal disc protrusion with foraminal stenosis     Subjective:      Chief Complaint: Back and Right Lower Extremity Pain    HPI:  Juan Diego Ivory is a 45 y.o. male presenting for initial visit with chief complaint of back pain -referred by Dr. Bonds. Seen in ED on 4/11/2025 for back and right lower extremity pain. Reports onset of pain about 12 weeks ago without inciting injury or event. Pain has been progressively worsening. He has had episodes of back pain in  the past that resolved with conservative treatment. Pain worsens throughout the day. Pain worse with prolonged standing and walking, improved with sitting and rest however pain is still present. Pain radiates from his right low back into his lateral right hip into the lateral aspect of his right lower extremity with intermittent numbness into his right foot. Difficulty going up/down steps due to subjective right lower extremity weakness, worse when pain is severe. Right foot gets caught when walking. Denies significant left lower extremity symptoms. Daily activities and functioning decreased due to pain and symptoms. Was active and going to the gym daily prior to onset of symptoms 12 weeks ago. Denies any devon trauma. Denies fever or chills, no night sweats. Denies any bladder or bowel changes.      Denies heart or lung disease. Denies diabetes or kidney disease. He is following with weight management. Does report bicuspid aortic valve that may require replacement next year.    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 He is planning to retire in December  Living situation: Lives with family   ADLs: patient is able to perform     Objective:     Family History[1]    Past Medical History[2]    Current Medications[3]    Past Surgical History[4]    Social History     Socioeconomic History   • Marital status:      Spouse name: Not on file   • Number of children: Not on file   • Years of education: Not on file   • Highest education level: Not on file  "  Occupational History   • Not on file   Tobacco Use   • Smoking status: Never   • Smokeless tobacco: Former     Types: Chew     Quit date: 04/2024   • Tobacco comments:     Tried Welbutrin (sp?), didnt work   Vaping Use   • Vaping status: Never Used   Substance and Sexual Activity   • Alcohol use: Yes     Comment: Not nearly enough   • Drug use: No   • Sexual activity: Yes     Partners: Female     Birth control/protection: Male Sterilization   Other Topics Concern   • Not on file   Social History Narrative    Always uses seat belt     Caffeine Use     Dental care regularly           Social Drivers of Health     Financial Resource Strain: Not on file   Food Insecurity: Not on file   Transportation Needs: Not on file   Physical Activity: Not on file   Stress: Not on file   Social Connections: Not on file   Intimate Partner Violence: Not on file   Housing Stability: Not on file       Allergies[5]    Review of Systems  General- denies fever/chills  HEENT- denies hearing loss or sore throat  Eyes- denies eye pain or visual disturbances, denies red eyes  Respiratory- denies cough or SOB  Cardio- denies chest pain or palpitations  GI- denies abdominal pain  Endocrine- denies urinary frequency  Urinary- denies pain with urination  Musculoskeletal- Negative except noted above  Skin- denies rashes or wounds  Neurological- denies dizziness or headache  Psychiatric- denies anxiety or difficulty concentrating    Physical Exam  Ht 6' 1\" (1.854 m)   Wt (!) 137 kg (302 lb 0.5 oz)   BMI 39.85 kg/m²     General/Constitutional: No apparent distress: well-nourished and well developed.  Lymphatic: No appreciable lymphadenopathy  Respiratory: Non-labored breathing  Vascular: No edema, swelling or tenderness, except as noted in detailed exam.  Integumentary: No impressive skin lesions present, except as noted in detailed exam.  Psych: Normal mood and affect, oriented to person, place and time.  MSK: normal other than stated in " "HPI and exam  Gait & balance: no evidence of myelopathic gait, ambulates Independently     Lumbar spine range of motion:  -Forward flexion to 90  -Extension to neutral  There is mild tenderness with palpation along lumbar paraspinal musculature, no midline tenderness     Neurologic:  Lower Extremity Motor Function    Right  Left    Iliopsoas  5/5  5/5    Quadriceps 5/5 5/5   Tibialis anterior  4+/5  5/5    EHL  5/5  5/5    Gastroc. muscle  5/5  5/5      Sensory: light touch is intact to bilateral lower extremities     Reflexes:  Intact, diminished     Other tests:  Straight Leg Raise: positive right  [place] SI: negative  CAYLA SI: negative  Greater troch: no tenderness   Internal/external hip ROM: intact, no pain   Flexion/extension knee ROM: intact, no pain   Vascular: WWP extremities    Electronic Medical Records were reviewed including office notes, imaging studies, radiology reports, pain management notes     Procedures, if performed today     None performed       Portions of the record may have been created with voice recognition software.  Occasional wrong word or \"sound a like\" substitutions may have occurred due to the inherent limitations of voice recognition software.  Read the chart carefully and recognize, using context, where substitutions have occurred.         [1]  Family History  Problem Relation Name Age of Onset   • Hypertension Father Juan Diego Milkovits Jr    • Prostate cancer Maternal Grandfather Eliud Breaux    • Cancer Maternal Grandfather Eliud Breaux         Prostate   • Other Paternal Grandmother Daily Milkovits         Carotid Stenosis    • Hyperlipidemia Paternal Grandmother Daily Milkovits    • Hypertension Paternal Grandmother Daily Milkovits    • Stroke Paternal Grandmother Daily Milkovits    • Coronary artery disease Paternal Grandfather Juan Diego Milkovits Sr    • Hyperlipidemia Paternal Grandfather Juan Diego Milkovits Sr    • Hypertension Paternal Grandfather Juan Diego Milkovits Sr    • " "Diabetes type II Paternal Grandfather Juan Diego Ivory Sr    • Breast cancer Mother Eliud Breaux    • Cancer Mother Eliud Breaux         Prostate   • Thyroid disease Neg Hx     [2]  Past Medical History:  Diagnosis Date   • Allergic     [place]   • Aortic valve cusp abnormality     \"bicuspid aortic valve\"   • Atrial fibrillation (HCC)    • Hypertension    • Obesity    [3]  Current Outpatient Medications   Medication Sig Dispense Refill   • lisinopril-hydrochlorothiazide (PRINZIDE,ZESTORETIC) 20-12.5 MG per tablet Take 2 tablets by mouth daily 60 tablet 5   • meloxicam (MOBIC) 15 mg tablet Take 1 tablet (15 mg total) by mouth daily 30 tablet 1   • Multiple Vitamins-Minerals (MULTIVITAMIN ADULT EXTRA C PO) Take by mouth     • potassium chloride (Klor-Con M20) 20 mEq tablet Take 1 tablet (20 mEq total) by mouth daily 30 tablet 5   • pregabalin (LYRICA) 75 mg capsule Take 1 capsule (75 mg total) by mouth 2 (two) times a day 60 capsule 1   • tirzepatide (Zepbound) 7.5 mg/0.5 mL auto-injector Inject 0.5 mL (7.5 mg total) under the skin once a week 2 mL 1     No current facility-administered medications for this visit.   [4]  Past Surgical History:  Procedure Laterality Date   • ANTERIOR CRUCIATE LIGAMENT REPAIR Right    • KNEE ARTHROSCOPY Right    • ORTHOPEDIC SURGERY     • OTHER SURGICAL HISTORY      [facility]   • [facility] SURGICAL ARTHROSCOPY SHOULDER REPAIR SLAP LESION Left 05/26/2016    Procedure: SHOULDER ARTHROSCOPY SLAP REPAIR;  Surgeon: Mg Burns MD;  Location: [facility];  Service: Orthopedics   • [facility] SURGICAL ARTHROSCOPY SHOULDER REPAIR SLAP LESION Right 11/26/2018    Procedure: Shoulder Arthroscopy with labral repair and biceps tenotomy;  Surgeon: Mg Burns MD;  Location: [facility];  Service: Orthopedics   • ROTATOR CUFF REPAIR Left    • SHOULDER SURGERY  5/26/16   • VASECTOMY  6/14/07   [5]  Allergies  Allergen Reactions   • Penicillins Hives   "

## 2025-06-04 NOTE — PROGRESS NOTES
Left voicemail for patient to return call     ----- Message from Moira Huitron MD sent at 5/14/2024  3:07 PM CDT -----  Swabone with yeast,Recommend Terazol 7 ,she can start use this Friday due to recent biopsies performed.  Thanks   Name: Juan Diego Ivory      : 1980       MRN: [id number]   Encounter Provider: Romero Rosales MD   Encounter Date: 25  Encounter department: [facility] ORTHOPED[facility] SPINE SURGERY    Medical Decision Making:     Assessment & Plan  Degeneration of intervertebral disc of lumbar region, unspecified whether pain present    The clinical, physical and imaging findings were reviewed with the patient  Discussed the etiology and pathomechanics of lumbar disc degeneration, lumbar radiculopathy, L5 bilateral pars defect with isthmic spondylolisthesis  Lumbar MRI reviewed in detail with the patient  Discussed operative and non operative treatment options   Did discuss role of surgery in form of L5-S1 decompression with instrumented spinal fusion to address foraminal stenosis and isthmic spondylolisthesis. He continues with back and right lower extremity pain despite oral medications and interventional spine procedures. His pain does continue to impact his daily activities and functioning. He remains overall neurologically intact. Did discuss how he would likely be out of work for at least 3 months after surgery, possible 6 months.  We did explain that some patient never return to work after lumbar fusion surgery.  He is planning to retire this upcoming December and is unable to take time off and would need to delay any operative intervention at this time.  Non operative treatment options include physical therapy, at home exercises, activity modifications, chiropractic medicine, acupuncture, oral medications, and interventional spine procedures  Recommend physical therapy/HEP to work on core strengthening, lumbar ROM, strengthening, and stretching exercises.  Referral provided. Can try traction exercises.  Follow up with pain management for repeat injection, he has upcoming right L5-S1 TFESI schedule for 2025. Discussed potential role of steroid injection at  or near the source of pain to provide targeted relief.   Ice/heat, OTC pain medication as needed.  Follow-up:  Return in about 6 months after further conservative treatments if symptoms persist and he wishes to pursue surgical intervention.    Orders:    XR spine lumbar minimum 4 views non injury; Future    Radiculopathy, lumbar region  See plan above       Isthmic spondylolisthesis  See plan above                Diagnostic Tests   IMAGING: I have personally reviewed the images and these are my findings:  Lumbar Spine X-rays from 6/4/2025: multi level lumbar spondylosis with loss of disc height, facet hypertrophy, L5-S1 spondylolisthesis, no appreciated lytic/blastic lesions, no obvious instability    Lumbar Spine MRI from 5/22/2025: multi level lumbar disc degeneration with disc desiccation & loss of disc height notably L5-S1, bilateral pars defect L5, right sided L5-S1 foraminal disc protrusion with foraminal stenosis     Subjective:      Chief Complaint: Back and Right Lower Extremity Pain    HPI:  Juan Diego Ivory is a 45 y.o. male presenting for initial visit with chief complaint of back pain -referred by Dr. Bonds. Seen in ED on 4/11/2025 for back and right lower extremity pain. Reports onset of pain about 12 weeks ago without inciting injury or event. Pain has been progressively worsening. He has had episodes of back pain in the past that resolved with conservative treatment. Pain worsens throughout the day. Pain worse with prolonged standing and walking, improved with sitting and rest however pain is still present. Pain radiates from his right low back into his lateral right hip into the lateral aspect of his right lower extremity with intermittent numbness into his right foot. Difficulty going up/down steps due to subjective right lower extremity weakness, worse when pain is severe. Right foot gets caught when walking. Denies significant left lower extremity symptoms. Daily activities and functioning  decreased due to pain and symptoms. Was active and going to the gym daily prior to onset of symptoms 12 weeks ago. Denies any devon trauma. Denies fever or chills, no night sweats. Denies any bladder or bowel changes.      Denies heart or lung disease. Denies diabetes or kidney disease. He is following with weight management. Does report bicuspid aortic valve that may require replacement next year.    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 He is planning to retire in December  Living situation: Lives with family   ADLs: patient is able to perform     Objective:     Family History[1]    Past Medical History[2]    Current Medications[3]    Past Surgical History[4]    Social History     Socioeconomic History    Marital status:      Spouse name: Not on file    Number of children: Not on file    Years of education: Not on file    Highest education level: Not on file   Occupational History    Not on file   Tobacco Use    Smoking status: Never    Smokeless tobacco: Former     Types: Chew     Quit date: 04/2024    Tobacco comments:     Tried Welbutrin (sp?), didnt work   Vaping Use    Vaping status: Never Used   Substance and Sexual Activity    Alcohol use: Yes     Comment: Not nearly enough    Drug use: No    Sexual activity: Yes     Partners: Female     Birth control/protection: Male Sterilization   Other Topics Concern    Not on file   Social History Narrative    Always uses seat belt     Caffeine Use     Dental care regularly           Social Drivers  "of Health     Financial Resource Strain: Not on file   Food Insecurity: Not on file   Transportation Needs: Not on file   Physical Activity: Not on file   Stress: Not on file   Social Connections: Not on file   Intimate Partner Violence: Not on file   Housing Stability: Not on file       Allergies[5]    Review of Systems  General- denies fever/chills  HEENT- denies hearing loss or sore throat  Eyes- denies eye pain or visual disturbances, denies red eyes  Respiratory- denies cough or SOB  Cardio- denies chest pain or palpitations  GI- denies abdominal pain  Endocrine- denies urinary frequency  Urinary- denies pain with urination  Musculoskeletal- Negative except noted above  Skin- denies rashes or wounds  Neurological- denies dizziness or headache  Psychiatric- denies anxiety or difficulty concentrating    Physical Exam  Ht 6' 1\" (1.854 m)   Wt (!) 137 kg (302 lb 0.5 oz)   BMI 39.85 kg/m²     General/Constitutional: No apparent distress: well-nourished and well developed.  Lymphatic: No appreciable lymphadenopathy  Respiratory: Non-labored breathing  Vascular: No edema, swelling or tenderness, except as noted in detailed exam.  Integumentary: No impressive skin lesions present, except as noted in detailed exam.  Psych: Normal mood and affect, oriented to person, place and time.  MSK: normal other than stated in HPI and exam  Gait & balance: no evidence of myelopathic gait, ambulates Independently     Lumbar spine range of motion:  -Forward flexion to 90  -Extension to neutral  There is mild tenderness with palpation along lumbar paraspinal musculature, no midline tenderness     Neurologic:  Lower Extremity Motor Function    Right  Left    Iliopsoas  5/5  5/5    Quadriceps 5/5 5/5   Tibialis anterior  4+/5  5/5    EHL  5/5  5/5    Gastroc. muscle  5/5  5/5      Sensory: light touch is intact to bilateral lower extremities     Reflexes:  Intact, diminished     Other tests:  Straight Leg Raise: positive right  [place] " "SI: negative  CAYLA SI: negative  Greater troch: no tenderness   Internal/external hip ROM: intact, no pain   Flexion/extension knee ROM: intact, no pain   Vascular: WWP extremities    Electronic Medical Records were reviewed including office notes, imaging studies, radiology reports, pain management notes     Procedures, if performed today     None performed       Portions of the record may have been created with voice recognition software.  Occasional wrong word or \"sound a like\" substitutions may have occurred due to the inherent limitations of voice recognition software.  Read the chart carefully and recognize, using context, where substitutions have occurred.       [1]   Family History  Problem Relation Name Age of Onset    Hypertension Father Juan Diego Milkovits Jr     Prostate cancer Maternal Grandfather Wilard Tucker     Cancer Maternal Grandfather Wilhay Namita         Prostate    Other Paternal Grandmother Daily Milkovits         Carotid Stenosis     Hyperlipidemia Paternal Grandmother Daily Milkovits     Hypertension Paternal Grandmother Daily Milkovits     Stroke Paternal Grandmother Daily Milkovits     Coronary artery disease Paternal Grandfather Juan Diego Milkovits Sr     Hyperlipidemia Paternal Grandfather Juan Diego Milkovits Sr     Hypertension Paternal Grandfather Juan Diego Milkovits Sr     Diabetes type II Paternal Grandfather Juan Diego Milkovits Sr     Breast cancer Mother Wilard Tucker     Cancer Mother Wilhay Bustilloard         Prostate    Thyroid disease Neg Hx     [2]   Past Medical History:  Diagnosis Date    Allergic     Penicillan    Aortic valve cusp abnormality     \"bicuspid aortic valve\"    Atrial fibrillation (HCC)     Hypertension     Obesity    [3]   Current Outpatient Medications   Medication Sig Dispense Refill    lisinopril-hydrochlorothiazide (PRINZIDE,ZESTORETIC) 20-12.5 MG per tablet Take 2 tablets by mouth daily 60 tablet 5    meloxicam (MOBIC) 15 mg tablet Take 1 tablet (15 mg total) by " mouth daily 30 tablet 1    Multiple Vitamins-Minerals (MULTIVITAMIN ADULT EXTRA C PO) Take by mouth      potassium chloride (Klor-Con M20) 20 mEq tablet Take 1 tablet (20 mEq total) by mouth daily 30 tablet 5    pregabalin (LYRICA) 75 mg capsule Take 1 capsule (75 mg total) by mouth 2 (two) times a day 60 capsule 1    tirzepatide (Zepbound) 7.5 mg/0.5 mL auto-injector Inject 0.5 mL (7.5 mg total) under the skin once a week 2 mL 1     No current facility-administered medications for this visit.   [4]   Past Surgical History:  Procedure Laterality Date    ANTERIOR CRUCIATE LIGAMENT REPAIR Right     KNEE ARTHROSCOPY Right     ORTHOPEDIC SURGERY      OTHER SURGICAL HISTORY      SAE    MO SURGICAL ARTHROSCOPY SHOULDER REPAIR SLAP LESION Left 05/26/2016    Procedure: SHOULDER ARTHROSCOPY SLAP REPAIR;  Surgeon: Mg Burns MD;  Location: [facility];  Service: Orthopedics    MO SURGICAL ARTHROSCOPY SHOULDER REPAIR SLAP LESION Right 11/26/2018    Procedure: Shoulder Arthroscopy with labral repair and biceps tenotomy;  Surgeon: Mg Burns MD;  Location: [facility];  Service: Orthopedics    ROTATOR CUFF REPAIR Left     SHOULDER SURGERY  5/26/16    VASECTOMY  6/14/07   [5]   Allergies  Allergen Reactions    Penicillins Hives

## 2025-06-09 DIAGNOSIS — G47.33 OSA (OBSTRUCTIVE SLEEP APNEA): Primary | ICD-10-CM

## 2025-06-09 DIAGNOSIS — I10 PRIMARY HYPERTENSION: ICD-10-CM

## 2025-06-09 DIAGNOSIS — E66.812 OBESITY, CLASS II, BMI 35-39.9: ICD-10-CM

## 2025-06-09 RX ORDER — TIRZEPATIDE 10 MG/.5ML
10 INJECTION, SOLUTION SUBCUTANEOUS WEEKLY
Qty: 2 ML | Refills: 2 | Status: SHIPPED | OUTPATIENT
Start: 2025-06-09 | End: 2025-08-04

## 2025-06-11 ENCOUNTER — TELEPHONE (OUTPATIENT)
Age: 45
End: 2025-06-11

## 2025-06-11 NOTE — TELEPHONE ENCOUNTER
Caller: pt    Doctor: Dr. galeano    Reason for call: pt needs to r/s his procedure.    Call back#: [phone]

## 2025-06-27 ENCOUNTER — APPOINTMENT (OUTPATIENT)
Dept: LAB | Facility: MEDICAL CENTER | Age: 45
End: 2025-06-27
Attending: INTERNAL MEDICINE

## 2025-06-27 DIAGNOSIS — Z00.6 ENCOUNTER FOR EXAMINATION FOR NORMAL COMPARISON OR CONTROL IN CLINICAL RESEARCH PROGRAM: ICD-10-CM

## 2025-06-27 DIAGNOSIS — I10 HYPERTENSION, UNSPECIFIED TYPE: ICD-10-CM

## 2025-06-27 PROCEDURE — 36415 COLL VENOUS BLD VENIPUNCTURE: CPT

## 2025-07-11 LAB
APOB+LDLR+PCSK9 GENE MUT ANL BLD/T: NOT DETECTED
BRCA1+BRCA2 DEL+DUP + FULL MUT ANL BLD/T: NOT DETECTED
MLH1+MSH2+MSH6+PMS2 GN DEL+DUP+FUL M: NOT DETECTED

## 2025-07-15 ENCOUNTER — OFFICE VISIT (OUTPATIENT)
Dept: BARIATRICS | Facility: CLINIC | Age: 45
End: 2025-07-15
Payer: COMMERCIAL

## 2025-07-15 VITALS
WEIGHT: 297 LBS | RESPIRATION RATE: 17 BRPM | TEMPERATURE: 98.6 F | DIASTOLIC BLOOD PRESSURE: 83 MMHG | HEIGHT: 73 IN | HEART RATE: 65 BPM | SYSTOLIC BLOOD PRESSURE: 110 MMHG | BODY MASS INDEX: 39.36 KG/M2

## 2025-07-15 DIAGNOSIS — G47.33 OSA (OBSTRUCTIVE SLEEP APNEA): ICD-10-CM

## 2025-07-15 DIAGNOSIS — E66.812 OBESITY, CLASS II, BMI 35-39.9: Primary | ICD-10-CM

## 2025-07-15 DIAGNOSIS — I10 PRIMARY HYPERTENSION: ICD-10-CM

## 2025-07-15 PROCEDURE — 99214 OFFICE O/P EST MOD 30 MIN: CPT | Performed by: PHYSICIAN ASSISTANT

## 2025-07-15 RX ORDER — TIRZEPATIDE 10 MG/.5ML
10 INJECTION, SOLUTION SUBCUTANEOUS WEEKLY
Qty: 2 ML | Refills: 2 | Status: SHIPPED | OUTPATIENT
Start: 2025-07-15 | End: 2025-09-09

## 2025-07-18 ENCOUNTER — HOSPITAL ENCOUNTER (OUTPATIENT)
Dept: RADIOLOGY | Facility: MEDICAL CENTER | Age: 45
Discharge: HOME | End: 2025-07-18
Attending: PHYSICAL MEDICINE & REHABILITATION
Payer: COMMERCIAL

## 2025-07-18 ENCOUNTER — TELEPHONE (OUTPATIENT)
Dept: PAIN MEDICINE | Facility: MEDICAL CENTER | Age: 45
End: 2025-07-18

## 2025-07-18 VITALS
DIASTOLIC BLOOD PRESSURE: 80 MMHG | SYSTOLIC BLOOD PRESSURE: 123 MMHG | OXYGEN SATURATION: 98 % | HEART RATE: 63 BPM | RESPIRATION RATE: 20 BRPM | TEMPERATURE: 97.8 F

## 2025-07-18 DIAGNOSIS — M79.2 NEUROPATHIC PAIN: ICD-10-CM

## 2025-07-18 DIAGNOSIS — M54.41 ACUTE RIGHT-SIDED LOW BACK PAIN WITH RIGHT-SIDED SCIATICA: ICD-10-CM

## 2025-07-18 DIAGNOSIS — M54.16 LUMBAR RADICULOPATHY: ICD-10-CM

## 2025-07-18 PROCEDURE — 64483 NJX AA&/STRD TFRM EPI L/S 1: CPT | Performed by: PHYSICAL MEDICINE & REHABILITATION

## 2025-07-18 RX ORDER — PREGABALIN 75 MG/1
75 CAPSULE ORAL 2 TIMES DAILY
Qty: 60 CAPSULE | Refills: 2 | Status: SHIPPED | OUTPATIENT
Start: 2025-07-18

## 2025-07-18 RX ORDER — MELOXICAM 15 MG/1
15 TABLET ORAL DAILY
Qty: 30 TABLET | Refills: 1 | Status: SHIPPED | OUTPATIENT
Start: 2025-07-18

## 2025-07-18 RX ORDER — PAPAVERINE HCL 150 MG
10 CAPSULE, EXTENDED RELEASE ORAL ONCE
Status: COMPLETED | OUTPATIENT
Start: 2025-07-18 | End: 2025-07-18

## 2025-07-18 RX ADMIN — DEXAMETHASONE SODIUM PHOSPHATE 10 MG: 10 INJECTION INTRAMUSCULAR; INTRAVENOUS at 08:21

## 2025-07-18 RX ADMIN — IOHEXOL 2 ML: 300 INJECTION, SOLUTION INTRAVENOUS at 08:21

## 2025-07-18 NOTE — H&P
"History of Present Illness: The patient is a 45 y.o. male who presents with complaints of right back and leg pain    Past Medical History[1]    Past Surgical History[2]    Current Medications[3]    Allergies[4]    Physical Exam:   Vitals:    07/18/25 0759   BP: 106/75   Pulse: 56   Resp: 20   Temp: 97.8 °F (36.6 °C)   SpO2: 99%     General: Awake, Alert, Oriented x 3, Mood and affect appropriate  Respiratory: Respirations even and unlabored  Cardiovascular: Peripheral pulses intact; no edema  Musculoskeletal Exam: right back and leg pain    ASA Score: 3    Patient/Chart Verification  Patient ID Verified: Verbal  ID Band Applied: No  Consents Confirmed: To be obtained in the Procedural area  Interval H&P(within 24 hr) Complete (required for Outpatients and Surgery Admit only): To be obtained in the Procedural area  Allergies Reviewed: Yes  Anticoag/NSAID held?: NA  Currently on antibiotics?: No    Assessment:   1. Lumbar radiculopathy        Plan: RT L5-S1 TFESI (63124)         [1]   Past Medical History:  Diagnosis Date    Allergic     Penicillan    Aortic valve cusp abnormality     \"bicuspid aortic valve\"    Atrial fibrillation (HCC)     Hypertension     Obesity    [2]   Past Surgical History:  Procedure Laterality Date    ANTERIOR CRUCIATE LIGAMENT REPAIR Right     KNEE ARTHROSCOPY Right     ORTHOPEDIC SURGERY      OTHER SURGICAL HISTORY      [facility]    [place] SURGICAL ARTHROSCOPY SHOULDER REPAIR SLAP LESION Left 05/26/2016    Procedure: SHOULDER ARTHROSCOPY SLAP REPAIR;  Surgeon: Mg Burns MD;  Location: [facility];  Service: Orthopedics    MN SURGICAL ARTHROSCOPY SHOULDER REPAIR SLAP LESION Right 11/26/2018    Procedure: Shoulder Arthroscopy with labral repair and biceps tenotomy;  Surgeon: Mg Burns MD;  Location: [facility];  Service: Orthopedics    ROTATOR CUFF REPAIR Left     SHOULDER SURGERY  5/26/16    VASECTOMY  6/14/07   [3]   Current Outpatient Medications:     lisinopril-hydrochlorothiazide " (PRINZIDE,ZESTORETIC) 20-12.5 MG per tablet, Take 2 tablets by mouth daily, Disp: 60 tablet, Rfl: 5    meloxicam (MOBIC) 15 mg tablet, Take 1 tablet (15 mg total) by mouth daily, Disp: 30 tablet, Rfl: 1    Multiple Vitamins-Minerals (MULTIVITAMIN ADULT EXTRA C PO), Take by mouth, Disp: , Rfl:     potassium chloride (Klor-Con M20) 20 mEq tablet, Take 1 tablet (20 mEq total) by mouth daily, Disp: 30 tablet, Rfl: 5    pregabalin (LYRICA) 75 mg capsule, Take 1 capsule (75 mg total) by mouth 2 (two) times a day, Disp: 60 capsule, Rfl: 1    tirzepatide (Zepbound) 10 mg/0.5 mL auto-injector, Inject 0.5 mL (10 mg total) under the skin once a week, Disp: 2 mL, Rfl: 2  [4]   Allergies  Allergen Reactions    Penicillins Hives

## 2025-07-18 NOTE — DISCHARGE INSTR - LAB
Epidural Steroid Injection   WHAT YOU NEED TO KNOW:   An epidural steroid injection (LUCY) is a procedure to inject steroid medicine into the epidural space. The epidural space is between your spinal cord and vertebrae. Steroids reduce inflammation and fluid buildup in your spine that may be causing pain. You may be given pain medicine along with the steroids.          ACTIVITY  Do not drive or operate machinery today.  No strenuous activity today - bending, lifting, etc.  You may resume normal activites starting tomorrow - start slowly and as tolerated.  You may shower today, but no tub baths or hot tubs.  You may have numbness for several hours from the local anesthetic. Please use caution and common sense, especially with weight-bearing activities.    CARE OF THE INJECTION SITE  If you have soreness or pain, apply ice to the area today (20 minutes on/20 minutes off).  Starting tomorrow, you may use warm, moist heat or ice if needed.  You may have an increase or change in your discomfort for 36-48 hours after your treatment.  Apply ice and continue with any pain medication you have been prescribed.  Notify [facility] if you have any of the following: redness, drainage, swelling, headache, stiff neck or fever above 100°F.    SPECIAL INSTRUCTIONS  Our office will contact you in approximately 14 days for a progress report.    MEDICATIONS  Continue to take all routine medications.  Our office may have instructed you to hold some medications.    As no general anesthesia was used in today's procedure, you should not experience any side effects related to anesthesia.     If you are diabetic, the steroids used in today's injection may temporarily increase your blood sugar levels after the first few days after your injection. Please keep a close eye on your sugars and alert the doctor who manages your diabetes if your sugars are significantly high from your baseline or you are symptomatic.     If you have a  problem specifically related to your procedure, please call our office at [phone].  Problems not related to your procedure should be directed to your primary care physician.

## 2025-07-18 NOTE — TELEPHONE ENCOUNTER
Pt requesting refill of Lyrica medication. Pt reports taking 75mg BID with relief and no negative s/e. Last prescribed on 05/23 at OV x 30 days with 1 refill. Please review and advise.

## 2025-07-23 DIAGNOSIS — M79.2 NEUROPATHIC PAIN: ICD-10-CM

## 2025-07-23 RX ORDER — PREGABALIN 75 MG/1
75 CAPSULE ORAL 2 TIMES DAILY
Qty: 60 CAPSULE | Refills: 0 | OUTPATIENT
Start: 2025-07-23

## 2025-07-23 RX ORDER — PREGABALIN 75 MG/1
75 CAPSULE ORAL 2 TIMES DAILY
Qty: 60 CAPSULE | Refills: 0 | Status: CANCELLED | OUTPATIENT
Start: 2025-07-23

## 2025-08-01 ENCOUNTER — TELEPHONE (OUTPATIENT)
Dept: PAIN MEDICINE | Facility: CLINIC | Age: 45
End: 2025-08-01

## 2025-08-04 DIAGNOSIS — G47.33 OSA (OBSTRUCTIVE SLEEP APNEA): ICD-10-CM

## 2025-08-04 DIAGNOSIS — E66.812 OBESITY, CLASS II, BMI 35-39.9: Primary | ICD-10-CM

## 2025-08-04 DIAGNOSIS — I10 PRIMARY HYPERTENSION: ICD-10-CM

## 2025-08-04 RX ORDER — TIRZEPATIDE 12.5 MG/.5ML
12.5 INJECTION, SOLUTION SUBCUTANEOUS WEEKLY
Qty: 2 ML | Refills: 2 | Status: SHIPPED | OUTPATIENT
Start: 2025-08-04 | End: 2025-10-27

## (undated) DEVICE — NEEDLE SPINAL18G X 3.5 IN QUINCKE

## (undated) DEVICE — SKIN MARKER DUAL TIP WITH RULER CAP, FLEXIBLE RULER AND LABELS: Brand: DEVON

## (undated) DEVICE — BURR  OVAL 4MM 13CM 8 FLUTE COOLCUT

## (undated) DEVICE — LOOP SUT W/PASSER 90DEG

## (undated) DEVICE — TUBING ARTHROSCOPIC WAVE  MAIN PUMP

## (undated) DEVICE — TUBING SUCTION 5MM X 12 FT

## (undated) DEVICE — GLOVE SRG BIOGEL 8

## (undated) DEVICE — SHOULDER SUSPENSION KIT 6 PER BOX

## (undated) DEVICE — TRANSPOSAL ULTRAFLEX DUO/QUAD ULTRA CART MANIFOLD

## (undated) DEVICE — NEEDLE SUT SCORPION MULTIFIRE

## (undated) DEVICE — NEEDLE 21 G X 1

## (undated) DEVICE — BLADE SHAVER TORPEDO 4MM 13CM  COOLCUT

## (undated) DEVICE — GAUZE SPONGES,16 PLY: Brand: CURITY

## (undated) DEVICE — SUT ETHILON 3-0 PS-1 18 IN 1663H

## (undated) DEVICE — ABDOMINAL PAD: Brand: DERMACEA

## (undated) DEVICE — 1820 FOAM BLOCK NEEDLE COUNTER: Brand: DEVON

## (undated) DEVICE — SUT FIBERWIRE #2 1/2 CIRCLE T-5 38IN AR-7200

## (undated) DEVICE — MAT FLOOR STEP DRI 24 X 36 IN N-STRL

## (undated) DEVICE — SPONGE LAP 18 X 18 IN

## (undated) DEVICE — ALL PURPOSE SPONGES,NON-WOVEN, 4 PLY: Brand: CURITY

## (undated) DEVICE — BLADE SHAVER EXCALIBUR 4MM 13CM COOLCUT

## (undated) DEVICE — KIT DISP 3MM PLLA SHOULDER

## (undated) DEVICE — DRY-DOC CANNULA WITH DISPOSABLE OBTURATOR, 8.0 X 85 MM: Brand: DRY-DOC

## (undated) DEVICE — SURGICAL GOWN, XL SMARTSLEEVE: Brand: CONVERTORS

## (undated) DEVICE — GLOVE INDICATOR PI UNDERGLOVE SZ 8 BLUE

## (undated) DEVICE — OCCLUSIVE GAUZE STRIP,3% BISMUTH TRIBROMOPHENATE IN PETROLATUM BLEND: Brand: XEROFORM

## (undated) DEVICE — BETHLEHEM UNIVERSAL  ARTHRO PK: Brand: CARDINAL HEALTH

## (undated) DEVICE — 3M™ STERI-DRAPE™ U-DRAPE 1015: Brand: STERI-DRAPE™

## (undated) DEVICE — THREE-QUARTER SHEET: Brand: CONVERTORS

## (undated) DEVICE — 3M™ IOBAN™ 2 ANTIMICROBIAL INCISE DRAPE 6648EZ: Brand: IOBAN™ 2

## (undated) DEVICE — LOOP SUT W/PASSER 25DEG CRV LT

## (undated) DEVICE — LIGHT GLOVE GREEN

## (undated) DEVICE — INTENDED FOR TISSUE SEPARATION, AND OTHER PROCEDURES THAT REQUIRE A SHARP SURGICAL BLADE TO PUNCTURE OR CUT.: Brand: BARD-PARKER ® CARBON RIB-BACK BLADES

## (undated) DEVICE — DRY-DOC CANNULA WITH DISPOSABLE OBTURATOR, 7.0 X 95 MM: Brand: DRY-DOC

## (undated) DEVICE — CHLORAPREP HI-LITE 26ML ORANGE

## (undated) DEVICE — SURGI KIT INSTRUMENT ORGANIZER

## (undated) DEVICE — ELECTRODE ELECSURG SUPER TURBOVAC 90 3.75MM X 5.4 IN

## (undated) DEVICE — SYRINGE 20ML LL

## (undated) DEVICE — SUT PDS II 1 CT-1 27 IN Z347H

## (undated) DEVICE — DRY-DOC CANNULA WITH DISPOSABLE OBTURATOR, 7.0 X 85 MM: Brand: DRY-DOC